# Patient Record
Sex: MALE | Race: WHITE | Employment: UNEMPLOYED | URBAN - METROPOLITAN AREA
[De-identification: names, ages, dates, MRNs, and addresses within clinical notes are randomized per-mention and may not be internally consistent; named-entity substitution may affect disease eponyms.]

---

## 2017-05-09 PROBLEM — N20.1 URETERAL CALCULUS, LEFT: Status: ACTIVE | Noted: 2017-05-09

## 2022-05-03 ENCOUNTER — APPOINTMENT (OUTPATIENT)
Dept: CT IMAGING | Age: 72
End: 2022-05-03
Payer: MEDICARE

## 2022-05-03 ENCOUNTER — HOSPITAL ENCOUNTER (EMERGENCY)
Age: 72
Discharge: HOME | End: 2022-05-03
Payer: MEDICARE

## 2022-05-03 VITALS
WEIGHT: 140 LBS | BODY MASS INDEX: 20.73 KG/M2 | DIASTOLIC BLOOD PRESSURE: 68 MMHG | HEIGHT: 69 IN | OXYGEN SATURATION: 96 % | SYSTOLIC BLOOD PRESSURE: 144 MMHG | HEART RATE: 63 BPM | TEMPERATURE: 98.2 F | RESPIRATION RATE: 18 BRPM

## 2022-05-03 DIAGNOSIS — R79.89 CREATININE ELEVATION: ICD-10-CM

## 2022-05-03 DIAGNOSIS — I71.40 ABDOMINAL AORTIC ANEURYSM (AAA) 3.0 CM TO 5.5 CM IN DIAMETER IN MALE: ICD-10-CM

## 2022-05-03 DIAGNOSIS — S39.012A STRAIN OF LUMBAR REGION, INITIAL ENCOUNTER: Primary | ICD-10-CM

## 2022-05-03 LAB
ALBUMIN SERPL-MCNC: 4 GM/DL (ref 3.4–5)
ALP BLD-CCNC: 137 IU/L (ref 40–129)
ALT SERPL-CCNC: 12 U/L (ref 10–40)
ANION GAP SERPL CALCULATED.3IONS-SCNC: 11 MMOL/L (ref 4–16)
AST SERPL-CCNC: 20 IU/L (ref 15–37)
BILIRUB SERPL-MCNC: 0.7 MG/DL (ref 0–1)
BUN BLDV-MCNC: 34 MG/DL (ref 6–23)
CALCIUM SERPL-MCNC: 9 MG/DL (ref 8.3–10.6)
CHLORIDE BLD-SCNC: 99 MMOL/L (ref 99–110)
CO2: 25 MMOL/L (ref 21–32)
CREAT SERPL-MCNC: 1.6 MG/DL (ref 0.9–1.3)
GFR AFRICAN AMERICAN: 52 ML/MIN/1.73M2
GFR NON-AFRICAN AMERICAN: 43 ML/MIN/1.73M2
GLUCOSE BLD-MCNC: 206 MG/DL (ref 70–99)
POTASSIUM SERPL-SCNC: 5 MMOL/L (ref 3.5–5.1)
SODIUM BLD-SCNC: 135 MMOL/L (ref 135–145)
TOTAL PROTEIN: 7.1 GM/DL (ref 6.4–8.2)

## 2022-05-03 PROCEDURE — 80053 COMPREHEN METABOLIC PANEL: CPT

## 2022-05-03 PROCEDURE — 6370000000 HC RX 637 (ALT 250 FOR IP): Performed by: PHYSICIAN ASSISTANT

## 2022-05-03 PROCEDURE — 72131 CT LUMBAR SPINE W/O DYE: CPT

## 2022-05-03 PROCEDURE — 99284 EMERGENCY DEPT VISIT MOD MDM: CPT

## 2022-05-03 RX ORDER — LIDOCAINE 4 G/G
1 PATCH TOPICAL DAILY
Status: DISCONTINUED | OUTPATIENT
Start: 2022-05-03 | End: 2022-05-03 | Stop reason: HOSPADM

## 2022-05-03 RX ORDER — 0.9 % SODIUM CHLORIDE 0.9 %
1000 INTRAVENOUS SOLUTION INTRAVENOUS ONCE
Status: DISCONTINUED | OUTPATIENT
Start: 2022-05-03 | End: 2022-05-03

## 2022-05-03 RX ORDER — ACETAMINOPHEN 500 MG
1000 TABLET ORAL EVERY 6 HOURS PRN
Qty: 45 TABLET | Refills: 0 | Status: SHIPPED | OUTPATIENT
Start: 2022-05-03

## 2022-05-03 RX ORDER — ACETAMINOPHEN 325 MG/1
650 TABLET ORAL ONCE
Status: COMPLETED | OUTPATIENT
Start: 2022-05-03 | End: 2022-05-03

## 2022-05-03 RX ORDER — METHOCARBAMOL 500 MG/1
750 TABLET, FILM COATED ORAL ONCE
Status: COMPLETED | OUTPATIENT
Start: 2022-05-03 | End: 2022-05-03

## 2022-05-03 RX ORDER — LIDOCAINE 50 MG/G
1 PATCH TOPICAL DAILY
Qty: 30 PATCH | Refills: 0 | Status: SHIPPED | OUTPATIENT
Start: 2022-05-03 | End: 2022-06-02

## 2022-05-03 RX ORDER — METHOCARBAMOL 750 MG/1
750 TABLET, FILM COATED ORAL 4 TIMES DAILY
Qty: 40 TABLET | Refills: 0 | Status: SHIPPED | OUTPATIENT
Start: 2022-05-03 | End: 2022-05-13

## 2022-05-03 RX ADMIN — ACETAMINOPHEN 650 MG: 325 TABLET ORAL at 15:32

## 2022-05-03 RX ADMIN — METHOCARBAMOL 750 MG: 500 TABLET ORAL at 15:32

## 2022-05-03 ASSESSMENT — PAIN DESCRIPTION - ORIENTATION: ORIENTATION: RIGHT

## 2022-05-03 ASSESSMENT — PAIN DESCRIPTION - DESCRIPTORS: DESCRIPTORS: NAGGING

## 2022-05-03 ASSESSMENT — PAIN DESCRIPTION - PAIN TYPE: TYPE: ACUTE PAIN

## 2022-05-03 ASSESSMENT — PAIN SCALES - GENERAL
PAINLEVEL_OUTOF10: 5
PAINLEVEL_OUTOF10: 5

## 2022-05-03 ASSESSMENT — PAIN DESCRIPTION - FREQUENCY: FREQUENCY: INTERMITTENT

## 2022-05-03 ASSESSMENT — PAIN DESCRIPTION - LOCATION: LOCATION: BACK

## 2022-05-03 NOTE — ED PROVIDER NOTES
Patient Identification  Barby Oliva is a 66 y.o. male    Chief Complaint  Back Pain (back pain s/p fall few days ago)      HPI  (History provided by patient and family)  This is a 66 y.o. male who was brought in by POV for chief complaint of right-sided lower back pain has been ongoing since a fall 4 days ago. Patient was attempting to clean his household pet when the dog ran around him with the leash on him, causing him to fall onto his back and grass. Denies loss of consciousness, hitting his head, neck pain, upper back pain or chest pain. Patient reports the pain goes from the right lower side and does radiate somewhat to his right lateral flank. Denies hematuria. Is unsure the last time he urinated. Does report a history of kidney disease and has been admitted in the past for DORCAS. Has not taken any medication for pain. Denies radiation of pain down his legs, denies saddle paresthesia, denies loss of bowel or bladder control. Denies any new numbness or paresthesias. REVIEW OF SYSTEMS    Constitutional:  Denies fever, chills  HENT:  Denies sore throat or ear pain   Eyes: Denies vision changes, eye pain  Cardiovascular:  Denies chest pain, syncope  Respiratory:  Denies shortness of breath, cough   GI:  Denies abdominal pain, nausea, vomiting  :  Denies dysuria, discharge  Musculoskeletal:  Denies joint pain  Skin:  Denies rash, pruritis  Neurologic:  Denies headache, focal weakness, or sensory changes     See HPI and nursing notes for additional information     I have reviewed the following nursing documentation:  Allergies: No Known Allergies    Past medical history:  has a past medical history of CAD (coronary artery disease), COPD (chronic obstructive pulmonary disease) ([facility] [address] Diabetes mellitus ([facility] [address] Hyperlipidemia, Hypertension, and Renal failure.     Past surgical history:  has a past surgical history that includes Coronary artery bypass graft; Coronary angioplasty with stent; and Cystocopy (N/A, 05/09/2017). Home medications:   Prior to Admission medications    Medication Sig Start Date End Date Taking? Authorizing Provider   oxyCODONE-acetaminophen (PERCOCET) 5-325 MG per tablet Take 1 tablet by mouth every 6 hours as needed for Pain . Historical Provider, MD   UNABLE TO FIND Sliding scale insulin    Historical Provider, MD   ondansetron (ZOFRAN ODT) 4 MG disintegrating tablet Take 1 tablet by mouth every 6 hours 5/5/17   Beti Cantu PA-C   dicyclomine (BENTYL) 10 MG capsule Take 2 capsules by mouth 4 times daily (before meals and nightly) 5/5/17   Beti Cantu PA-C   tamsulosin (FLOMAX) 0.4 MG capsule 1 tab by mouth each bedtime when necessary pain from kidney stones. 5/5/17   Beti Cantu PA-C   nitroGLYCERIN (NITROSTAT) 0.4 MG SL tablet Place 1 tablet under the tongue every 5 minutes as needed for Chest pain. 12/15/13   Mona Carrel, MD   atorvastatin (LIPITOR) 40 MG tablet Take 40 mg by mouth daily. Historical Provider, MD   enalapril (VASOTEC) 10 MG tablet Take 10 mg by mouth daily. Historical Provider, MD   amLODIPine (NORVASC) 10 MG tablet Take 10 mg by mouth daily. Historical Provider, MD   metoprolol (TOPROL-XL) 50 MG XL tablet Take 50 mg by mouth daily. Historical Provider, MD   insulin glargine (LANTUS) 100 UNIT/ML injection Inject 24 Units into the skin every morning. Historical Provider, MD   aspirin 81 MG EC tablet Take 81 mg by mouth daily. Historical Provider, MD       Social history:  reports that he has been smoking cigarettes. He has a 37.50 pack-year smoking history. He does not have any smokeless tobacco history on file. He reports current alcohol use. He reports that he does not use drugs.     Family history:    Family History   Problem Relation Age of Onset    Diabetes Mother     Diabetes Father     Diabetes Brother     Diabetes Maternal Grandmother        Exam  BP (!) 144/68   Pulse 63   Temp 98.2 °F (36.8 °C) (Oral)   Resp 18   Ht 5' 9\" (1.753 m)   Wt 140 lb (63.5 kg)   SpO2 96%   BMI 20.67 kg/m²   Nursing note and vitals reviewed. Constitutional: Well developed, well nourished. No acute distress. HENT:      Head: Normocephalic and atraumatic. Ears: External ears normal.      Nose: Nose normal.     Mouth: Membrane mucosa dry and pink. No posterior oropharynx erythema or tonsillar edema  Eyes: Anicteric sclera. No discharge, PERRL  Neck: Supple. Trachea midline. Cardiovascular: RRR, no murmurs, rubs, or gallops, radial pulses 2+ bilaterally. Pulmonary/Chest: Effort normal. No respiratory distress. CTAB. No stridor. No wheezes. No rales. Abdominal: Soft. Nontender to palpation. No distension. No guarding, rebound tenderness, or evidence of ascites. : No CVA tenderness. Back: Obvious deformities, no midline tenderness to palpation of the thoracic or lumbar spine. No step-off. There is hypertonicity appreciated at the right paraspinal muscles. Patient has full range of motion of spine with some worse pain with right lateral rotation. Musculoskeletal: Patient's lower extremity have 5 out of 5 strength with hip abduction, abduction, flexion extension, knee flexion extension, ankle flexion extension. Able to ambulate with a steady gait. Neurological: Alert and oriented to person, place, and time. Normal muscle tone. Sensation intact, no sensory or motor deficits. Deep tendon reflexes are symmetrical bilaterally. Skin: Warm and dry. No rash. Psychiatric: Normal mood and affect. Behavior is normal.      Radiographs (if obtained):  [] The following radiograph was interpreted by myself in the absence of a radiologist:   [x] Radiologist's Report Reviewed:  CT Lumbar Spine WO Contrast   Final Result   1. No evidence of acute osseous abnormality involving the lumbar spine. 2.  There is a 4.1 cm infrarenal abdominal aortic aneurysm, previously   measuring 3.4 cm on May 2017 CT. See recommendations below. RECOMMENDATIONS:   4.1 cm infrarenal abdominal aortic aneurysm. Recommend follow-up every 12   months and vascular consultation. Reference: J Am Di Radiol 4514;42:[id number]. Labs  Results for orders placed or performed during the hospital encounter of 05/03/22   Comprehensive Metabolic Panel w/ Reflex to MG   Result Value Ref Range    Sodium 135 135 - 145 MMOL/L    Potassium 5.0 3.5 - 5.1 MMOL/L    Chloride 99 99 - 110 mMol/L    CO2 25 21 - 32 MMOL/L    BUN 34 (H) 6 - 23 MG/DL    CREATININE 1.6 (H) 0.9 - 1.3 MG/DL    Glucose 206 (H) 70 - 99 MG/DL    Calcium 9.0 8.3 - 10.6 MG/DL    Albumin 4.0 3.4 - 5.0 GM/DL    Total Protein 7.1 6.4 - 8.2 GM/DL    Total Bilirubin 0.7 0.0 - 1.0 MG/DL    ALT 12 10 - 40 U/L    AST 20 15 - 37 IU/L    Alkaline Phosphatase 137 (H) 40 - 129 IU/L    GFR Non- 43 (L) >60 mL/min/1.73m2    GFR  52 (L) >60 mL/min/1.73m2    Anion Gap 11 4 - 16         MDM  The above for more information, patient's physical exam unremarkable does have full range of motion does have some hypertonicity in the right paraspinal muscles with no midline tenderness palpation. Given patient's age I did elect to check a metabolic panel as well as a CT scan of the lumbar spine. CT scan lumbar spine remarkable for abdominal aortic aneurysm that has grown some since his last scan in 2017 but still meets guidelines for surveillance. Patient also has a mild increase in his creatinine, last time it was checked in 2017. I do not feel that patient requires admission as it does not meet criteria for DORCAS. Patient is p.o. tolerant. Has stable vital signs. Pain mildly improved with Lidoderm patch. Did offer patient some Lidoderm patches as well as Tylenol to go home with. Advised that he follow-up with his primary care provider for repeat lab work in the next 1 to 2 weeks. Patient verbalized understanding and agreement with this plan.   At this time I do not suspect cauda equina, cord compression or discitis      Final Impression  1. Low back pain  2. Elevated creatinine  3. Abdominal aortic aneurysm    Blood pressure (!) 144/68, pulse 63, temperature 98.2 °F (36.8 °C), temperature source Oral, resp. rate 18, height 5' 9\" (1.753 m), weight 140 lb (63.5 kg), SpO2 96 %. Disposition:  Discharge to home in stable condition. Patient was given scripts for the following medications. I counseled patient how to take these medications. New Prescriptions    No medications on file       This chart was generated using the [address] [place] [address] dictation system. I created this record but it may contain dictation errors given the limitations of this technology.      Do Patel PA-C  05/03/22 6683

## 2022-05-03 NOTE — ED NOTES
Discharge instructions reviewed with pt. All questions answered at this time. Pt verbalized understanding.       Lamberto Cotton RN  05/03/22 3382

## 2023-01-05 ENCOUNTER — APPOINTMENT (OUTPATIENT)
Dept: GENERAL RADIOLOGY | Age: 73
End: 2023-01-05
Payer: OTHER GOVERNMENT

## 2023-01-05 ENCOUNTER — HOSPITAL ENCOUNTER (EMERGENCY)
Age: 73
Discharge: HOME | End: 2023-01-05
Attending: STUDENT IN AN ORGANIZED HEALTH CARE EDUCATION/TRAINING PROGRAM
Payer: OTHER GOVERNMENT

## 2023-01-05 VITALS
TEMPERATURE: 97.8 F | RESPIRATION RATE: 20 BRPM | HEART RATE: 74 BPM | OXYGEN SATURATION: 96 % | SYSTOLIC BLOOD PRESSURE: 131 MMHG | DIASTOLIC BLOOD PRESSURE: 62 MMHG

## 2023-01-05 DIAGNOSIS — U07.1 COVID-19: Primary | ICD-10-CM

## 2023-01-05 PROCEDURE — 71045 X-RAY EXAM CHEST 1 VIEW: CPT

## 2023-01-05 PROCEDURE — 99283 EMERGENCY DEPT VISIT LOW MDM: CPT

## 2023-01-05 NOTE — ED PROVIDER NOTES
Emergency Department Encounter    Patient: Vasyl Heredia  MRN: [id number]  : 1950  Date of Evaluation: 2023  ED Provider:  Manuel Bell DO    Triage Chief Complaint:   No chief complaint on file. [place]:  Vasyl Heredia is a 74 y.o. male with past medical history of diabetes, hypertension, hyperlipidemia, CAD, COPD, past surgical history of right partial pneumonectomy who presents to the ED on the advice of his doctors at the [facility] after he tested positive for COVID, about 3 to 4 days ago. Patient endorses a history of shortness of breath and cough but states he has a chronic history of both symptoms. Patient denies any change in the symptoms severity since he tested positive for COVID. No history of chest pain, fever, orthopnea, dyspnea on exertion, nausea, diaphoresis, or palpitation.   ROS - see HPI, below listed is current ROS at time of my eval:  ROS is an in history; otherwise unremarkable    Past Medical History:   Diagnosis Date    CAD (coronary artery disease)     COPD (chronic obstructive pulmonary disease) ([facility] [address]     Diabetes mellitus ([facility] [address]     Hyperlipidemia     Hypertension     Renal failure      Past Surgical History:   Procedure Laterality Date    CORONARY ANGIOPLASTY WITH STENT PLACEMENT      CORONARY ARTERY BYPASS GRAFT      CYSTOSCOPY N/A 2017    cysto, left rgpg, left stent     Family History   Problem Relation Age of Onset    Diabetes Mother     Diabetes Father     Diabetes Brother     Diabetes Maternal Grandmother      Social History     Socioeconomic History    Marital status:      Spouse name: Not on file    Number of children: Not on file    Years of education: Not on file    Highest education level: Not on file   Occupational History    Not on file   Tobacco Use    Smoking status: Every Day     Packs/day: 2.50     Years: 15.00     Pack years: 37.50     Types: Cigarettes    Smokeless tobacco: Not on file   Substance and Sexual Activity Alcohol use: Yes     Comment: rarely    Drug use: No    Sexual activity: Not on file   Other Topics Concern    Not on file   Social History Narrative    Not on file     Social Determinants of Health     Financial Resource Strain: Not on file   Food Insecurity: Not on file   Transportation Needs: Not on file   Physical Activity: Not on file   Stress: Not on file   Social Connections: Not on file   Intimate Partner Violence: Not on file   Housing Stability: Not on file     No current facility-administered medications for this encounter. Current Outpatient Medications   Medication Sig Dispense Refill    nirmatrelvir/ritonavir (PAXLOVID, 300/100,) 20 x 150 MG & 10 x 100MG TBPK Take 3 tablets by mouth in the morning and 3 tablets in the evening. Do all this for 5 days. Take 3 tablets (two 150 mg nirmatrelvir and one 100 mg ritonavir tablets) by mouth every 12 hours for 5 days. . 30 tablet 0    acetaminophen (TYLENOL) 500 MG tablet Take 2 tablets by mouth every 6 hours as needed for Pain 45 tablet 0    oxyCODONE-acetaminophen (PERCOCET) 5-325 MG per tablet Take 1 tablet by mouth every 6 hours as needed for Pain . UNABLE TO FIND Sliding scale insulin      ondansetron (ZOFRAN ODT) 4 MG disintegrating tablet Take 1 tablet by mouth every 6 hours 10 tablet 0    dicyclomine (BENTYL) 10 MG capsule Take 2 capsules by mouth 4 times daily (before meals and nightly) 30 capsule 0    tamsulosin (FLOMAX) 0.4 MG capsule 1 tab by mouth each bedtime when necessary pain from kidney stones. 10 capsule 0    nitroGLYCERIN (NITROSTAT) 0.4 MG SL tablet Place 1 tablet under the tongue every 5 minutes as needed for Chest pain. 25 tablet 2    atorvastatin (LIPITOR) 40 MG tablet Take 40 mg by mouth daily. enalapril (VASOTEC) 10 MG tablet Take 10 mg by mouth daily. amLODIPine (NORVASC) 10 MG tablet Take 10 mg by mouth daily. metoprolol (TOPROL-XL) 50 MG XL tablet Take 50 mg by mouth daily.       insulin glargine (LANTUS) 100 UNIT/ML injection Inject 24 Units into the skin every morning. aspirin 81 MG EC tablet Take 81 mg by mouth daily. No Known Allergies    Nursing Notes Reviewed    Physical Exam:  Triage VS:    ED Triage Vitals [01/05/23 1436]   Enc Vitals Group      /62      Heart Rate 74      Resp 20      Temp 97.8 °F (36.6 °C)      Temp Source Oral      SpO2 96 %      Weight       Height       Head Circumference       Peak Flow       Pain Score       Pain Loc       Pain Edu? Excl. in [address]? My pulse ox interpretation is - normal    General appearance:  No acute distress. Skin:  Warm. Dry. Eye:  Extraocular movements intact. Ears, nose, mouth and throat:  Oral mucosa moist   Neck:  Trachea midline. Extremity:  No obvious deformity, erythema, or warmth. No swelling. Normal ROM. Distal pulses intact. Heart:  Regular rate and rhythm, normal S1 & S2, no extra heart sounds. Perfusion:  intact  Respiratory:  Lungs clear to auscultation bilaterally. Respirations nonlabored. Abdominal:  Normal bowel sounds. Soft. Nontender. Non distended. Back:  No CVA tenderness to palpation     Neurological:  Alert and oriented times 3. No focal neuro deficits. Psychiatric:  Appropriate    I have reviewed and interpreted all of the currently available lab results from this visit (if applicable):  No results found for this visit on 01/05/23. Radiographs (if obtained):  Radiologist's Report Reviewed:  XR CHEST PORTABLE   Preliminary Result   Surgical staples and rounded mass in the right hilar region. The most recent   study is from 2013. Correlation with more recent chest x-ray or CT of the   chest is suggested to exclude underlying recurrent disease. No other   abnormality of the lung fields.            .    EKG (if obtained): (All EKG's are interpreted by myself in the absence of a cardiologist)    MDM:  75-year-old male with past medical history of diabetes, hypertension, hyperlipidemia, CAD, COPD, past surgical history of right partial pneumonectomy who presents to the ED on the advice of his doctors at the [facility] after he tested positive for COVID, about 3 to 4 days ago. Patient endorses a history of shortness of breath and cough but states he has a chronic history of both symptoms. Patient denies any change in the symptoms severity since he tested positive for COVID. No history of chest pain, fever, orthopnea, dyspnea on exertion, nausea, diaphoresis, or palpitation. Physical examination is unremarkable. Differentials include COVID-pneumonia, flu, bacterial pneumonia, COPD exacerbation, ACS, pericarditis. Patient scheduled for chest x-ray and blood draw for labs. Patient refuses blood draw for labs at this time and said he just wants a chest x-ray as he was advised by his doctors at [facility].    Chest x-ray ordered. 5:00 PM  Chest x-ray is unremarkable for any acute changes at this time. Patient is stable, no respiratory distress, no labored respiration, no symptoms at this time. Patient is discharged with Paxlovid and advised to follow-up with primary care. Patient advised on adequate hydration and healthy nutrition, self isolation and social distancing. Patient also given return instructions including a persistence or worsening of symptoms including shortness of breath, or any other symptom of concern. Patient verbalizes understanding and agreement with the plan. Clinical Impression:  1. COVID-19      Disposition referral (if applicable):  No follow-up provider specified. Disposition medications (if applicable):  Discharge Medication List as of 1/5/2023  5:10 PM        START taking these medications    Details   nirmatrelvir/ritonavir (PAXLOVID, 300/100,) 20 x 150 MG & 10 x 100MG TBPK Take 3 tablets by mouth in the morning and 3 tablets in the evening. Do all this for 5 days.  Take 3 tablets (two 150 mg nirmatrelvir and one 100 mg ritonavir tablets) by mouth every 12 hours for 5 days. ., Disp-30 tablet, R-0Print           ED Provider Disposition Time  DISPOSITION Decision To Discharge 01/05/2023 05:04:15 PM      Comment: Please note this report has been produced using speech recognition software and may contain errors related to that system including errors in grammar, punctuation, and spelling, as well as words and phrases that may be inappropriate. Efforts were made to edit the dictations.         [address] [place] [address],[address],   01/06/23 1084

## 2023-01-05 NOTE — DISCHARGE INSTRUCTIONS
Full for primary care as soon as possible  Ensure adequate hydration and healthy nutrition. Ensure regular exercise as tolerated including breathing exercises. Return to the ED if symptoms persist or worsen.

## 2023-01-05 NOTE — ED NOTES
Patient noted stable and presenting in no acute distress. Discharge instructions and medication list reviewed (as required) with the patient. All questions and concerns were addressed at this time, and the patient expresses understanding. Patient released with vitals noted as recorded.         Priya De Jesus RN  01/05/23 5383

## 2023-08-07 ENCOUNTER — APPOINTMENT (OUTPATIENT)
Dept: CT IMAGING | Age: 73
DRG: 841 | End: 2023-08-07
Attending: STUDENT IN AN ORGANIZED HEALTH CARE EDUCATION/TRAINING PROGRAM
Payer: OTHER GOVERNMENT

## 2023-08-07 ENCOUNTER — HOSPITAL ENCOUNTER (INPATIENT)
Age: 73
LOS: 3 days | Discharge: HOME | DRG: 841 | End: 2023-08-10
Attending: STUDENT IN AN ORGANIZED HEALTH CARE EDUCATION/TRAINING PROGRAM | Admitting: INTERNAL MEDICINE
Payer: OTHER GOVERNMENT

## 2023-08-07 ENCOUNTER — APPOINTMENT (OUTPATIENT)
Dept: GENERAL RADIOLOGY | Age: 73
DRG: 841 | End: 2023-08-07
Payer: OTHER GOVERNMENT

## 2023-08-07 DIAGNOSIS — I71.40 ABDOMINAL AORTIC ANEURYSM (AAA) WITHOUT RUPTURE, UNSPECIFIED PART (HCC): ICD-10-CM

## 2023-08-07 DIAGNOSIS — E87.5 HYPERKALEMIA: ICD-10-CM

## 2023-08-07 DIAGNOSIS — K92.2 GASTROINTESTINAL HEMORRHAGE, UNSPECIFIED GASTROINTESTINAL HEMORRHAGE TYPE: ICD-10-CM

## 2023-08-07 DIAGNOSIS — C80.1: ICD-10-CM

## 2023-08-07 DIAGNOSIS — D50.0 ANEMIA DUE TO GI BLOOD LOSS: ICD-10-CM

## 2023-08-07 DIAGNOSIS — R07.9 CHEST PAIN, UNSPECIFIED TYPE: Primary | ICD-10-CM

## 2023-08-07 DIAGNOSIS — R79.89 ELEVATED BRAIN NATRIURETIC PEPTIDE (BNP) LEVEL: ICD-10-CM

## 2023-08-07 DIAGNOSIS — R91.8 LUNG NODULES: ICD-10-CM

## 2023-08-07 DIAGNOSIS — D53.9 MACROCYTIC ANEMIA: ICD-10-CM

## 2023-08-07 DIAGNOSIS — C79.51: ICD-10-CM

## 2023-08-07 DIAGNOSIS — D50.0 IRON DEFICIENCY ANEMIA SECONDARY TO BLOOD LOSS (CHRONIC): ICD-10-CM

## 2023-08-07 PROBLEM — C90.00 MULTIPLE MYELOMA (HCC): Status: ACTIVE | Noted: 2023-08-07

## 2023-08-07 LAB
ALBUMIN SERPL-MCNC: 3.6 GM/DL (ref 3.4–5)
ALBUMIN SERPL-MCNC: 3.8 GM/DL (ref 3.4–5)
ALP BLD-CCNC: 147 IU/L (ref 40–129)
ALP BLD-CCNC: 154 IU/L (ref 40–128)
ALT SERPL-CCNC: 28 U/L (ref 10–40)
ALT SERPL-CCNC: 28 U/L (ref 10–40)
ANION GAP SERPL CALCULATED.3IONS-SCNC: 12 MMOL/L (ref 4–16)
ANION GAP SERPL CALCULATED.3IONS-SCNC: 13 MMOL/L (ref 4–16)
ANISOCYTOSIS: ABNORMAL
AST SERPL-CCNC: 34 IU/L (ref 15–37)
AST SERPL-CCNC: 36 IU/L (ref 15–37)
BASOPHILS ABSOLUTE: 0.1 K/CU MM
BASOPHILS RELATIVE PERCENT: 1 % (ref 0–1)
BILIRUB SERPL-MCNC: 0.4 MG/DL (ref 0–1)
BILIRUB SERPL-MCNC: 0.4 MG/DL (ref 0–1)
BILIRUBIN DIRECT: 0.2 MG/DL (ref 0–0.3)
BILIRUBIN, INDIRECT: 0.2 MG/DL (ref 0–0.7)
BUN SERPL-MCNC: 35 MG/DL (ref 6–23)
BUN SERPL-MCNC: 38 MG/DL (ref 6–23)
CALCIUM SERPL-MCNC: 10.1 MG/DL (ref 8.3–10.6)
CALCIUM SERPL-MCNC: 10.1 MG/DL (ref 8.3–10.6)
CHLORIDE BLD-SCNC: 104 MMOL/L (ref 99–110)
CHLORIDE BLD-SCNC: 106 MMOL/L (ref 99–110)
CHP ED QC CHECK: YES
CO2: 23 MMOL/L (ref 21–32)
CO2: 28 MMOL/L (ref 21–32)
COMMENT: ABNORMAL
CREAT SERPL-MCNC: 1.7 MG/DL (ref 0.9–1.3)
CREAT SERPL-MCNC: 1.8 MG/DL (ref 0.9–1.3)
DIFFERENTIAL TYPE: ABNORMAL
EKG ATRIAL RATE: 60 BPM
EKG DIAGNOSIS: NORMAL
EKG P AXIS: 102 DEGREES
EKG P-R INTERVAL: 172 MS
EKG Q-T INTERVAL: 428 MS
EKG QRS DURATION: 84 MS
EKG QTC CALCULATION (BAZETT): 428 MS
EKG R AXIS: 69 DEGREES
EKG T AXIS: 100 DEGREES
EKG VENTRICULAR RATE: 60 BPM
EOSINOPHILS ABSOLUTE: 0.2 K/CU MM
EOSINOPHILS RELATIVE PERCENT: 5 % (ref 0–3)
FERRITIN: 22 NG/ML (ref 30–400)
FOLATE SERPL-MCNC: 5.5 NG/ML (ref 3.1–17.5)
GFR SERPL CREATININE-BSD FRML MDRD: 39 ML/MIN/1.73M2
GFR SERPL CREATININE-BSD FRML MDRD: 42 ML/MIN/1.73M2
GLUCOSE BLD-MCNC: 164 MG/DL (ref 70–99)
GLUCOSE BLD-MCNC: 184 MG/DL (ref 70–99)
GLUCOSE BLD-MCNC: 64 MG/DL
GLUCOSE BLD-MCNC: 64 MG/DL (ref 70–99)
GLUCOSE SERPL-MCNC: 161 MG/DL (ref 70–99)
GLUCOSE SERPL-MCNC: 61 MG/DL (ref 70–99)
HCT VFR BLD CALC: 25.3 % (ref 42–52)
HCT VFR BLD CALC: 27.5 % (ref 42–52)
HEMOGLOBIN: 7.5 GM/DL (ref 13.5–18)
HEMOGLOBIN: 8.1 GM/DL (ref 13.5–18)
IRON: 55 UG/DL (ref 59–158)
LIPASE: 39 IU/L (ref 13–60)
LYMPHOCYTES ABSOLUTE: 1.5 K/CU MM
LYMPHOCYTES RELATIVE PERCENT: 31 % (ref 24–44)
MAGNESIUM: 1.7 MG/DL (ref 1.8–2.4)
MCH RBC QN AUTO: 31.2 PG (ref 27–31)
MCHC RBC AUTO-ENTMCNC: 29.5 % (ref 32–36)
MCV RBC AUTO: 105.8 FL (ref 78–100)
MONOCYTES ABSOLUTE: 0.5 K/CU MM
MONOCYTES RELATIVE PERCENT: 11 % (ref 0–4)
NUCLEATED RBC %: 0 %
PCT TRANSFERRIN: 18 % (ref 10–44)
PDW BLD-RTO: 15.9 % (ref 11.7–14.9)
PLATELET # BLD: 253 K/CU MM (ref 140–440)
PMV BLD AUTO: 9.2 FL (ref 7.5–11.1)
POTASSIUM SERPL-SCNC: 4.7 MMOL/L (ref 3.5–5.1)
POTASSIUM SERPL-SCNC: 5.7 MMOL/L (ref 3.5–5.1)
PRO-BNP: 1531 PG/ML
RBC # BLD: 2.6 M/CU MM (ref 4.6–6.2)
RBC # BLD: ABNORMAL 10*6/UL
RETICULOCYTE COUNT PCT: 1.9 % (ref 0.2–2.2)
SEGMENTED NEUTROPHILS ABSOLUTE COUNT: 2.6 K/CU MM
SEGMENTED NEUTROPHILS RELATIVE PERCENT: 52 % (ref 36–66)
SODIUM BLD-SCNC: 142 MMOL/L (ref 135–145)
SODIUM BLD-SCNC: 144 MMOL/L (ref 135–145)
TOTAL IRON BINDING CAPACITY: 305 UG/DL (ref 250–450)
TOTAL NUCLEATED RBC: 0 K/CU MM
TOTAL PROTEIN: 9 GM/DL (ref 6.4–8.2)
TOTAL PROTEIN: 9.2 GM/DL (ref 6.4–8.2)
TRANSFERRIN SERPL-MCNC: 247.1 MG/DL (ref 200–360)
TROPONIN T: <0.01 NG/ML
UNSATURATED IRON BINDING CAPACITY: 250 UG/DL (ref 110–370)
VITAMIN B-12: 315.2 PG/ML (ref 211–911)
WBC # BLD: 4.9 K/CU MM (ref 4–10.5)

## 2023-08-07 PROCEDURE — 36415 COLL VENOUS BLD VENIPUNCTURE: CPT

## 2023-08-07 PROCEDURE — 83880 ASSAY OF NATRIURETIC PEPTIDE: CPT

## 2023-08-07 PROCEDURE — 83735 ASSAY OF MAGNESIUM: CPT

## 2023-08-07 PROCEDURE — 83690 ASSAY OF LIPASE: CPT

## 2023-08-07 PROCEDURE — 84466 ASSAY OF TRANSFERRIN: CPT

## 2023-08-07 PROCEDURE — 96375 TX/PRO/DX INJ NEW DRUG ADDON: CPT

## 2023-08-07 PROCEDURE — 6360000002 HC RX W HCPCS: Performed by: STUDENT IN AN ORGANIZED HEALTH CARE EDUCATION/TRAINING PROGRAM

## 2023-08-07 PROCEDURE — 6370000000 HC RX 637 (ALT 250 FOR IP): Performed by: INTERNAL MEDICINE

## 2023-08-07 PROCEDURE — 99285 EMERGENCY DEPT VISIT HI MDM: CPT

## 2023-08-07 PROCEDURE — 83010 ASSAY OF HAPTOGLOBIN QUANT: CPT

## 2023-08-07 PROCEDURE — C9113 INJ PANTOPRAZOLE SODIUM, VIA: HCPCS | Performed by: INTERNAL MEDICINE

## 2023-08-07 PROCEDURE — 96374 THER/PROPH/DIAG INJ IV PUSH: CPT

## 2023-08-07 PROCEDURE — 85045 AUTOMATED RETICULOCYTE COUNT: CPT

## 2023-08-07 PROCEDURE — 84484 ASSAY OF TROPONIN QUANT: CPT

## 2023-08-07 PROCEDURE — 80076 HEPATIC FUNCTION PANEL: CPT

## 2023-08-07 PROCEDURE — 94761 N-INVAS EAR/PLS OXIMETRY MLT: CPT

## 2023-08-07 PROCEDURE — 1200000000 HC SEMI PRIVATE

## 2023-08-07 PROCEDURE — 6370000000 HC RX 637 (ALT 250 FOR IP): Performed by: STUDENT IN AN ORGANIZED HEALTH CARE EDUCATION/TRAINING PROGRAM

## 2023-08-07 PROCEDURE — 80053 COMPREHEN METABOLIC PANEL: CPT

## 2023-08-07 PROCEDURE — 93010 ELECTROCARDIOGRAM REPORT: CPT | Performed by: INTERNAL MEDICINE

## 2023-08-07 PROCEDURE — 82962 GLUCOSE BLOOD TEST: CPT

## 2023-08-07 PROCEDURE — 99222 1ST HOSP IP/OBS MODERATE 55: CPT | Performed by: INTERNAL MEDICINE

## 2023-08-07 PROCEDURE — 85018 HEMOGLOBIN: CPT

## 2023-08-07 PROCEDURE — 93005 ELECTROCARDIOGRAM TRACING: CPT | Performed by: STUDENT IN AN ORGANIZED HEALTH CARE EDUCATION/TRAINING PROGRAM

## 2023-08-07 PROCEDURE — 85027 COMPLETE CBC AUTOMATED: CPT

## 2023-08-07 PROCEDURE — 2580000003 HC RX 258: Performed by: INTERNAL MEDICINE

## 2023-08-07 PROCEDURE — 82746 ASSAY OF FOLIC ACID SERUM: CPT

## 2023-08-07 PROCEDURE — 82607 VITAMIN B-12: CPT

## 2023-08-07 PROCEDURE — 71045 X-RAY EXAM CHEST 1 VIEW: CPT

## 2023-08-07 PROCEDURE — 85014 HEMATOCRIT: CPT

## 2023-08-07 PROCEDURE — 71250 CT THORAX DX C-: CPT

## 2023-08-07 PROCEDURE — 2500000003 HC RX 250 WO HCPCS: Performed by: STUDENT IN AN ORGANIZED HEALTH CARE EDUCATION/TRAINING PROGRAM

## 2023-08-07 PROCEDURE — 82728 ASSAY OF FERRITIN: CPT

## 2023-08-07 PROCEDURE — 6360000002 HC RX W HCPCS: Performed by: INTERNAL MEDICINE

## 2023-08-07 PROCEDURE — 80048 BASIC METABOLIC PNL TOTAL CA: CPT

## 2023-08-07 PROCEDURE — 2580000003 HC RX 258

## 2023-08-07 PROCEDURE — 83540 ASSAY OF IRON: CPT

## 2023-08-07 RX ORDER — FUROSEMIDE 10 MG/ML
40 INJECTION INTRAMUSCULAR; INTRAVENOUS ONCE
Status: COMPLETED | OUTPATIENT
Start: 2023-08-07 | End: 2023-08-07

## 2023-08-07 RX ORDER — WATER 1000 ML/1000ML
INJECTION, SOLUTION INTRAVENOUS
Status: COMPLETED
Start: 2023-08-07 | End: 2023-08-07

## 2023-08-07 RX ORDER — SODIUM CHLORIDE 9 MG/ML
INJECTION, SOLUTION INTRAVENOUS CONTINUOUS
Status: DISPENSED | OUTPATIENT
Start: 2023-08-07 | End: 2023-08-08

## 2023-08-07 RX ORDER — INSULIN LISPRO 100 [IU]/ML
0-4 INJECTION, SOLUTION INTRAVENOUS; SUBCUTANEOUS
Status: DISCONTINUED | OUTPATIENT
Start: 2023-08-07 | End: 2023-08-10 | Stop reason: HOSPADM

## 2023-08-07 RX ORDER — GLUCAGON 1 MG/ML
1 KIT INJECTION PRN
Status: DISCONTINUED | OUTPATIENT
Start: 2023-08-07 | End: 2023-08-10 | Stop reason: HOSPADM

## 2023-08-07 RX ORDER — HYDROCHLOROTHIAZIDE 25 MG/1
25 TABLET ORAL DAILY
Status: ON HOLD | COMMUNITY
End: 2023-08-10 | Stop reason: HOSPADM

## 2023-08-07 RX ORDER — LOSARTAN POTASSIUM 50 MG/1
50 TABLET ORAL 2 TIMES DAILY
Status: ON HOLD | COMMUNITY
End: 2023-08-10 | Stop reason: HOSPADM

## 2023-08-07 RX ORDER — AMLODIPINE BESYLATE 10 MG/1
10 TABLET ORAL DAILY
Status: DISCONTINUED | OUTPATIENT
Start: 2023-08-08 | End: 2023-08-10 | Stop reason: HOSPADM

## 2023-08-07 RX ORDER — LOSARTAN POTASSIUM 25 MG/1
50 TABLET ORAL 2 TIMES DAILY
Status: DISCONTINUED | OUTPATIENT
Start: 2023-08-08 | End: 2023-08-10 | Stop reason: HOSPADM

## 2023-08-07 RX ORDER — DOXYCYCLINE HYCLATE 50 MG/1
324 CAPSULE, GELATIN COATED ORAL
COMMUNITY

## 2023-08-07 RX ORDER — TRAMADOL HYDROCHLORIDE 50 MG/1
25 TABLET ORAL EVERY 6 HOURS PRN
Status: DISCONTINUED | OUTPATIENT
Start: 2023-08-07 | End: 2023-08-10 | Stop reason: HOSPADM

## 2023-08-07 RX ORDER — ACETAMINOPHEN 325 MG/1
650 TABLET ORAL EVERY 6 HOURS PRN
Status: DISCONTINUED | OUTPATIENT
Start: 2023-08-07 | End: 2023-08-10 | Stop reason: HOSPADM

## 2023-08-07 RX ORDER — PANTOPRAZOLE SODIUM 40 MG/10ML
80 INJECTION, POWDER, LYOPHILIZED, FOR SOLUTION INTRAVENOUS ONCE
Status: COMPLETED | OUTPATIENT
Start: 2023-08-07 | End: 2023-08-07

## 2023-08-07 RX ORDER — SODIUM CHLORIDE 9 MG/ML
INJECTION, SOLUTION INTRAVENOUS PRN
Status: DISCONTINUED | OUTPATIENT
Start: 2023-08-07 | End: 2023-08-10 | Stop reason: HOSPADM

## 2023-08-07 RX ORDER — DEXTROSE MONOHYDRATE 100 MG/ML
INJECTION, SOLUTION INTRAVENOUS CONTINUOUS PRN
Status: DISCONTINUED | OUTPATIENT
Start: 2023-08-07 | End: 2023-08-10 | Stop reason: HOSPADM

## 2023-08-07 RX ORDER — DOXYCYCLINE HYCLATE 50 MG/1
324 CAPSULE, GELATIN COATED ORAL
Status: DISCONTINUED | OUTPATIENT
Start: 2023-08-08 | End: 2023-08-10 | Stop reason: HOSPADM

## 2023-08-07 RX ORDER — SODIUM CHLORIDE 0.9 % (FLUSH) 0.9 %
5-40 SYRINGE (ML) INJECTION PRN
Status: DISCONTINUED | OUTPATIENT
Start: 2023-08-07 | End: 2023-08-10 | Stop reason: HOSPADM

## 2023-08-07 RX ORDER — ONDANSETRON 4 MG/1
4 TABLET, ORALLY DISINTEGRATING ORAL EVERY 8 HOURS PRN
Status: DISCONTINUED | OUTPATIENT
Start: 2023-08-07 | End: 2023-08-10 | Stop reason: HOSPADM

## 2023-08-07 RX ORDER — ONDANSETRON 2 MG/ML
4 INJECTION INTRAMUSCULAR; INTRAVENOUS EVERY 6 HOURS PRN
Status: DISCONTINUED | OUTPATIENT
Start: 2023-08-07 | End: 2023-08-10 | Stop reason: HOSPADM

## 2023-08-07 RX ORDER — INSULIN LISPRO 100 [IU]/ML
0-4 INJECTION, SOLUTION INTRAVENOUS; SUBCUTANEOUS NIGHTLY
Status: DISCONTINUED | OUTPATIENT
Start: 2023-08-07 | End: 2023-08-10 | Stop reason: HOSPADM

## 2023-08-07 RX ORDER — ENOXAPARIN SODIUM 100 MG/ML
30 INJECTION SUBCUTANEOUS EVERY EVENING
Status: DISCONTINUED | OUTPATIENT
Start: 2023-08-07 | End: 2023-08-10 | Stop reason: HOSPADM

## 2023-08-07 RX ORDER — POLYETHYLENE GLYCOL 3350 17 G/17G
17 POWDER, FOR SOLUTION ORAL DAILY PRN
Status: DISCONTINUED | OUTPATIENT
Start: 2023-08-07 | End: 2023-08-10 | Stop reason: HOSPADM

## 2023-08-07 RX ORDER — HYDROCHLOROTHIAZIDE 25 MG/1
25 TABLET ORAL DAILY
Status: DISCONTINUED | OUTPATIENT
Start: 2023-08-08 | End: 2023-08-10 | Stop reason: HOSPADM

## 2023-08-07 RX ORDER — SODIUM CHLORIDE 0.9 % (FLUSH) 0.9 %
5-40 SYRINGE (ML) INJECTION EVERY 12 HOURS SCHEDULED
Status: DISCONTINUED | OUTPATIENT
Start: 2023-08-07 | End: 2023-08-10 | Stop reason: HOSPADM

## 2023-08-07 RX ORDER — METOPROLOL TARTRATE 50 MG/1
25 TABLET, FILM COATED ORAL 2 TIMES DAILY
COMMUNITY

## 2023-08-07 RX ORDER — ACETAMINOPHEN 650 MG/1
650 SUPPOSITORY RECTAL EVERY 6 HOURS PRN
Status: DISCONTINUED | OUTPATIENT
Start: 2023-08-07 | End: 2023-08-10 | Stop reason: HOSPADM

## 2023-08-07 RX ORDER — INSULIN GLARGINE 100 [IU]/ML
12 INJECTION, SOLUTION SUBCUTANEOUS EVERY MORNING
Status: DISCONTINUED | OUTPATIENT
Start: 2023-08-08 | End: 2023-08-10 | Stop reason: HOSPADM

## 2023-08-07 RX ORDER — ATORVASTATIN CALCIUM 40 MG/1
40 TABLET, FILM COATED ORAL NIGHTLY
Status: DISCONTINUED | OUTPATIENT
Start: 2023-08-07 | End: 2023-08-10 | Stop reason: HOSPADM

## 2023-08-07 RX ADMIN — ENOXAPARIN SODIUM 30 MG: 100 INJECTION SUBCUTANEOUS at 16:51

## 2023-08-07 RX ADMIN — METOPROLOL TARTRATE 25 MG: 25 TABLET, FILM COATED ORAL at 20:55

## 2023-08-07 RX ADMIN — PANTOPRAZOLE SODIUM 80 MG: 40 INJECTION, POWDER, FOR SOLUTION INTRAVENOUS at 17:13

## 2023-08-07 RX ADMIN — SODIUM BICARBONATE 50 MEQ: 84 INJECTION, SOLUTION INTRAVENOUS at 11:50

## 2023-08-07 RX ADMIN — SODIUM ZIRCONIUM CYCLOSILICATE 10 G: 10 POWDER, FOR SUSPENSION ORAL at 20:58

## 2023-08-07 RX ADMIN — ATORVASTATIN CALCIUM 40 MG: 40 TABLET, FILM COATED ORAL at 20:55

## 2023-08-07 RX ADMIN — FUROSEMIDE 40 MG: 10 INJECTION, SOLUTION INTRAMUSCULAR; INTRAVENOUS at 11:50

## 2023-08-07 RX ADMIN — SODIUM ZIRCONIUM CYCLOSILICATE 10 G: 5 POWDER, FOR SUSPENSION ORAL at 12:00

## 2023-08-07 RX ADMIN — SODIUM CHLORIDE: 9 INJECTION, SOLUTION INTRAVENOUS at 16:54

## 2023-08-07 RX ADMIN — WATER 10 ML: 1 INJECTION INTRAMUSCULAR; INTRAVENOUS; SUBCUTANEOUS at 17:13

## 2023-08-07 ASSESSMENT — LIFESTYLE VARIABLES
HOW MANY STANDARD DRINKS CONTAINING ALCOHOL DO YOU HAVE ON A TYPICAL DAY: PATIENT DOES NOT DRINK
HOW OFTEN DO YOU HAVE A DRINK CONTAINING ALCOHOL: NEVER

## 2023-08-07 NOTE — PROGRESS NOTES
Pt states spouse Jaylin Hernandez is currently at a nursing home facility.      Please contact children:  Dominic Blackmon, [phone]  Lisa Tam, [phone]  Daughter, [address], [phone]

## 2023-08-07 NOTE — ED NOTES
ED TO INPATIENT SBAR HANDOFF    Patient Name: Michael Gamboa   :    74 y.o. Preferred Name  Hood Cesar  Family/Caregiver Present no   Restraints no   C-SSRS: Risk of Suicide: No Risk  Sitter no   Sepsis Risk Score Sepsis Risk Score: 2.11      Situation  Chief Complaint   Patient presents with    Chest Pain     Brief Description of Patient's Condition: Pt to the Ed with c/o chest pain. Pt states the pain is on the right side and has been on going for a couple of days. Mental Status: oriented and alert  Arrived from: home    Imaging:   CT CHEST ABDOMEN PELVIS WO CONTRAST Additional Contrast? None   Preliminary Result   1. Overall interval increase in size of a 4.0 cm infrarenal abdominal aortic   aneurysm previously measuring 3.4 cm in 2017. No definite acute aortic   pathology on this unenhanced exam.   2. Postsurgical changes in the right lung. There are a few scattered solid   and sub solid nodules which are indeterminate and could be infectious,   inflammatory neoplastic in etiology. No prior exams are available for   comparison. Recommend a short-term follow-up in 3 months or per clinical   protocol. 3. At least two new discrete areas of lucency within the right ilium and L3   raising suspicion for metastatic disease. There is a new but otherwise age   indeterminate fracture at T11. An underlying pathologic fracture cannot be   excluded. Consider nonemergent evaluation with MRI once acute issues have   resolved. 4. No other acute findings in the chest, abdomen or pelvis on this unenhanced   exam.         XR CHEST PORTABLE   Final Result   No acute abnormality. Unchanged postsurgical changes and prominence of the right hilum.          MRI THORACIC SPINE WO CONTRAST    (Results Pending)     Abnormal labs:   Abnormal Labs Reviewed   CBC - Abnormal; Notable for the following components:       Result Value    RBC 2.60 (*)     Hemoglobin 8.1 (*)     Hematocrit 27.5 (*)     .8 (*) Transparent 08/07/23 0920   Dressing Intervention New 08/07/23 0920       Pertinent or High Risk Medications/Drips: no   If Yes, please provide details:   Blood Product Administration: no  If Yes, please provide details:     Recommendation    Incomplete orders   Additional Comments:    If any further questions, please call Sending RN at [phone]    Electronically signed by: Electronically signed by Karo Mansfield RN on 8/7/2023 at [facility], RN  08/07/23 1316 [facility]  08/07/23 1413

## 2023-08-07 NOTE — H&P
V2.0  History and Physical      Name:  Giovanni Miranda /Age/Sex:   (74 y.o. male)   MRN & CSN:  [id number] & [id number] Encounter Date/Time: 2023 3:28 PM EDT   Location:  ED28/ED-28 PCP: No primary care provider on file.        Hospital Day: 1    Assessment and Plan:   Giovanni Miranda is a 74 y.o. male who presents with Chest pain, rule out acute myocardial infarction    Hospital Problems             Last Modified POA    * (Principal) Chest pain, rule out acute myocardial infarction 2023 Yes     Generalized weakness, fatigue, drowsy, dyspnea on exertion; likely 2/2 anemia  Macrocytic anemia  -Presents with hemoglobin of 8.1, macrocytic.  -Anemia workup  - ml/hr x 24 hrs then reassess  -trend Hb, transfuse of Hb < 7 g/dl  -Consult hematology/oncology  -GI team consulted from ED - plan for scopes - keep NPO from midnight  -liquid diet today - regular diet post scopes    DORCAS versus DORCAS on CKD versus CKD, creatinine 1.8  -Creatinine 1.8 at presentation, lab work from 2017 on record showed creatinine 1.6  -No recent lab works on system  -Obtain records  -appears clinically dry -  ml/hr x 24 hrs  -follow renal function    Hyperkalemia, potassium 5.7, likely 2/2 DORCAS   -patient was given lasix 20 mg in the ED - hold off on diuretics as patient appears clinically dry and may have dorcas  -Lokelma 10 g TID x 6 doses  -follow BMP    Right-sided chest pain, potentially related to mets  -patient wants to avoid pain medication  -use tylenol for now  -tramadol if severe pain  -hold off on NSAIDS    Recently diagnosed multiple myeloma  Age indeterminate fracture at T11  Right ileum and L3 lucency-suspicion for metastatic disease  -consult hematology/oncology team  -obtain records from [place]  -obtain MRI thoacic spine    Insulin-dependent diabetes mellitus, takes Lantus 12 units every morning, sliding scale Humalog insulin  -Resume home insulin regimen  -POCT glucose checks  -Hypoglycemia treatment

## 2023-08-07 NOTE — ED NOTES
1227 paged Dr Prince Cao  08/07/23 1228    1247 repaged Dr Prince Cao  08/07/23 1247    1249 Dr Satish Luke returned call      Valerio Purdy  08/07/23 1258

## 2023-08-07 NOTE — ED NOTES
Medication History  [facility]    Patient Name: Samira Menendez [id number]     Medication history has been completed by: Leti Chu CPhT    Source(s) of information: Patient and documentation from the [facility]    Primary Care Physician: No primary care provider on file. Pharmacy: [facility]    Allergies as of 08/07/2023    (No Known Allergies)        Prior to Admission medications    Medication Sig Start Date End Date Taking? Authorizing Provider   ferrous gluconate (FERGON) 324 (38 Fe) MG tablet Take 1 tablet by mouth daily (with breakfast)   Yes Historical Provider, MD   hydroCHLOROthiazide (HYDRODIURIL) 25 MG tablet Take 1 tablet by mouth daily   Yes Historical Provider, MD   metoprolol tartrate (LOPRESSOR) 50 MG tablet Take 0.5 tablets by mouth 2 times daily 08/07/23 Splits a 50 mg tablet in half for 25 mg dosing BID   Yes Historical Provider, MD   losartan (COZAAR) 50 MG tablet Take 1 tablet by mouth in the morning and 1 tablet in the evening. Yes Historical Provider, MD   insulin aspart (NOVOLOG) 100 UNIT/ML injection pen Inject into the skin 3 times daily 08/07/23 Patient follows sliding scale regimen. Historical Provider, MD   acetaminophen (TYLENOL) 500 MG tablet Take 2 tablets by mouth every 6 hours as needed for Pain 5/3/22   Johnathan Miranda PA-C   nitroGLYCERIN (NITROSTAT) 0.4 MG SL tablet Place 1 tablet under the tongue every 5 minutes as needed for Chest pain.  12/15/13   Homero Mireles MD   atorvastatin (LIPITOR) 40 MG tablet Take 1 tablet by mouth nightly 08/07/23 Patient splits an 80 mg tablet in half for 40 mg dosing    Historical Provider, MD   amLODIPine (NORVASC) 10 MG tablet Take 1 tablet by mouth daily    Historical Provider, MD   insulin glargine (LANTUS) 100 UNIT/ML injection Inject 12 Units into the skin every morning    Historical Provider, MD   aspirin 81 MG EC tablet Take 1 tablet by mouth daily    Historical Provider, MD     Medications added or changed (ex. new medication, dosage change, interval change, formulation change):  Ferrous gluconate (added)  Novolog (added)  Lantus dosage change from 24 units to 12 units  Hctz (added)  Losartan (added)  Metoprolol ER changed to Metoprolol tartrate    Medications removed from list (include reason, ex. noncompliance, medication cost, therapy complete etc.):   Dicyclomine not taking  Enalapril not taking  Ondansetron not taking  Percocet therapy complete  Tamsulosin therapy complete    Comments:  Medication list reviewed with patient and verified per [facility] documentation. Insulin updated per information received. Patient states he uses Novolog on sliding scale and takes Lantus 12 units q am.  Reports first dose of medications taken today including insulin.     To my knowledge the above medication history is accurate as of 8/7/2023 9:51 AM.   Mauricio Ching CPhT   8/7/2023 9:51 AM

## 2023-08-07 NOTE — ED PROVIDER NOTES
Emergency Department Encounter    Patient: Eleanor Hodge  MRN: [id number]  : 1950  Date of Evaluation: 2023  ED Provider:  Bharathi Garcia DO    Triage Chief Complaint:   Chest Pain    [place]:  Eleanor Hodge is a 74 y.o. male with past medical history of diabetes, hypertension, hyperlipidemia, CAD, COPD, AAA, lung cancer s/p pneumonectomy, presenting to the ED with a history of right-sided chest pain which started couple of days ago. Patient states that he has always had right-sided chest wall pain after his pulmonectomy, couple of years ago but a few days ago he seemed to be having a new type of chest pain in the anterior chest wall on the right side. Patient also endorses a history of occasional nausea and vomiting as well as fatigue. No history of shortness of breath, fever, palpitations, cough, lightheadedness, nausea, diaphoresis, alcoholism,recent prolonged immobility, trauma or surgery.      ROS - see HPI, below listed is current ROS at time of my eval:  Review of Systems    ROS is an in history; otherwise unremarkable    Past Medical History:   Diagnosis Date    CAD (coronary artery disease)     COPD (chronic obstructive pulmonary disease) ([address])     Diabetes mellitus ([address])     Hyperlipidemia     Hypertension     Multiple myeloma ([address]) 2023    Renal failure      Past Surgical History:   Procedure Laterality Date    CORONARY ANGIOPLASTY WITH STENT PLACEMENT      CORONARY ARTERY BYPASS GRAFT      CYSTOSCOPY N/A 2017    cysto, left rgpg, left stent     Family History   Problem Relation Age of Onset    Diabetes Mother     Diabetes Father     Diabetes Brother     Diabetes Maternal Grandmother      Social History     Socioeconomic History    Marital status:      Spouse name: Not on file    Number of children: Not on file    Years of education: Not on file    Highest education level: Not on file   Occupational History    Not on file   Tobacco Use    Smoking status: a history of right-sided chest pain which started couple of days ago. Patient states that he has always had right-sided chest wall pain after his pulmonectomy, couple of years ago but a few days ago he seemed to be having a new type of chest pain in the anterior chest wall on the right side. Patient also endorses a history of occasional nausea and vomiting as well as fatigue. No history of shortness of breath, fever, palpitations, cough, lightheadedness, nausea, diaphoresis, alcoholism,recent prolonged immobility, trauma or surgery. Physical examination is significant for reproducible chest pain on palpation, bilateral crackles on lung auscultation, cachectic    Significant differentials considered at this time include, but not limited to,   - ACS, PE, Pneumonia, Pneumothorax/Tension Pneumothorax, Esophageal rupture, Aortic Dissection, Cardiac Tamponade, Pericarditis      EKG significant for normal sinus rhythm, normal EKG, normal axis  Ventricular rate of 60  Count of 172  QRS duration 84  QT/QTc 428/428  PRT axes 102 69 100  No STEMI  EKG is interpreted by me, pending read by cardiologist.         Laboratory evaluations include[de-identified]  Considered CBC, CMP, Troponin, VBG, BNP, Coags  Done: CBC, CMP, troponin, lipase, magnesium  Significant results : hgb 8.1, elevated potassium levels, elevated BNP,     Radiology include:  Considered CXR, CT-chest   Done: Chest x-ray  Significant results K at 4.0  Some infiltrates in 3 and ileum suggestive of possible metastasis. CT also shows some lung lesions suspicious for metastasis versus infection. AAA    Medications: Mello Fothergill     Medications   pantoprazole (PROTONIX) injection 80 mg (has no administration in time range)   furosemide (LASIX) injection 40 mg (40 mg IntraVENous Given 8/7/23 1150)   sodium zirconium cyclosilicate (LOKELMA) oral suspension 10 g (10 g Oral Given 8/7/23 1200)   sodium bicarbonate 8.4 % injection 50 mEq (50 mEq IntraVENous Given 8/7/23 1150)

## 2023-08-07 NOTE — CONSULTS
[facility] Gastroenterology and Hepatology             MD Shukri Mims MD             Sean Medina, APRN-CNP             [address] [facility] [address], [address] [facility]             [phone] fax [phone]      Gastroenterology Consult Note  Shukri Vences MD      Reason for Consult:  Concern for GI bleed. Primary Care Physician:  No primary care provider on file. History Obtained From:  patient, family member - son, electronic medical record    CC: Abdominal Pain,     HISTORY OF PRESENT ILLNESS:              The patient is a 74 y.o.  male with past medical history of COPD, COPD, diabetes mellitus, remote history of lung resection, status post composite graft to nasal columella who presented to the hospital with significant complaint of fatigue, chest pain. According to the patient's son who was present at the bedside, the patient was recently diagnosed with multiple myeloma. He mentions he was noted to be anemic at the [place] and he had a recent biopsy done over there as well. Currently no records are there in [address] [place] [address] however the son is able to give history. The patient currently denies any nausea, vomiting, melena, hematochezia. He mentions that he has never had a colonoscopy and would defer. He does endorse some chest pain that is nonexertional and intermittent. Denies any fever, chills, nausea, vomiting. On lab workup noted to be anemic with a hemoglobin of 8.1, microcytic, no leukocytosis, platelet count 726. LFTs noted to be unremarkable except for alkaline phosphatase of 147. Lipase 39. BNP 1531. Electrolytes with mild hyperkalemia potassium of 5.7, creatinine of 1.8.   CT of the abdomen pelvis reveals 4 cm infrarenal abdominal aortic aneurysm, postsurgical changes in the right lung, nodules which are indeterminate, 2 new discrete areas of lucency within the right ilium and L3 raising suspicion for metastatic disease, indeterminate fracture of

## 2023-08-08 ENCOUNTER — ANESTHESIA EVENT (OUTPATIENT)
Dept: ENDOSCOPY | Age: 73
DRG: 841 | End: 2023-08-08
Payer: OTHER GOVERNMENT

## 2023-08-08 ENCOUNTER — APPOINTMENT (OUTPATIENT)
Dept: MRI IMAGING | Age: 73
DRG: 841 | End: 2023-08-08
Payer: OTHER GOVERNMENT

## 2023-08-08 PROBLEM — R07.9 CHEST PAIN: Status: ACTIVE | Noted: 2023-08-08

## 2023-08-08 LAB
ALBUMIN SERPL-MCNC: 3 GM/DL (ref 3.4–5)
ALP BLD-CCNC: 120 IU/L (ref 40–129)
ALT SERPL-CCNC: 18 U/L (ref 10–40)
ANION GAP SERPL CALCULATED.3IONS-SCNC: 12 MMOL/L (ref 4–16)
AST SERPL-CCNC: 23 IU/L (ref 15–37)
BASOPHILS ABSOLUTE: 0 K/CU MM
BASOPHILS RELATIVE PERCENT: 1 % (ref 0–1)
BILIRUB SERPL-MCNC: 0.3 MG/DL (ref 0–1)
BILIRUBIN DIRECT: 0.2 MG/DL (ref 0–0.3)
BILIRUBIN, INDIRECT: 0.1 MG/DL (ref 0–0.7)
BUN SERPL-MCNC: 31 MG/DL (ref 6–23)
CALCIUM SERPL-MCNC: 9.2 MG/DL (ref 8.3–10.6)
CHLORIDE BLD-SCNC: 106 MMOL/L (ref 99–110)
CO2: 24 MMOL/L (ref 21–32)
CREAT SERPL-MCNC: 1.8 MG/DL (ref 0.9–1.3)
DIFFERENTIAL TYPE: ABNORMAL
EOSINOPHILS ABSOLUTE: 0.2 K/CU MM
EOSINOPHILS RELATIVE PERCENT: 5 % (ref 0–3)
GFR SERPL CREATININE-BSD FRML MDRD: 39 ML/MIN/1.73M2
GLUCOSE BLD-MCNC: 113 MG/DL (ref 70–99)
GLUCOSE BLD-MCNC: 183 MG/DL (ref 70–99)
GLUCOSE BLD-MCNC: 50 MG/DL (ref 70–99)
GLUCOSE BLD-MCNC: 62 MG/DL (ref 70–99)
GLUCOSE BLD-MCNC: 77 MG/DL (ref 70–99)
GLUCOSE BLD-MCNC: 98 MG/DL (ref 70–99)
GLUCOSE SERPL-MCNC: 51 MG/DL (ref 70–99)
HCT VFR BLD CALC: 23.7 % (ref 42–52)
HEMOGLOBIN: 7.2 GM/DL (ref 13.5–18)
INR BLD: 1.3 INDEX
LYMPHOCYTES ABSOLUTE: 1.3 K/CU MM
LYMPHOCYTES RELATIVE PERCENT: 29 % (ref 24–44)
MAGNESIUM: 1.6 MG/DL (ref 1.8–2.4)
MCH RBC QN AUTO: 31.9 PG (ref 27–31)
MCHC RBC AUTO-ENTMCNC: 30.4 % (ref 32–36)
MCV RBC AUTO: 104.9 FL (ref 78–100)
MONOCYTES ABSOLUTE: 0.3 K/CU MM
MONOCYTES RELATIVE PERCENT: 7 % (ref 0–4)
PDW BLD-RTO: 15.8 % (ref 11.7–14.9)
PHOSPHORUS: 4.3 MG/DL (ref 2.5–4.9)
PLATELET # BLD: 177 K/CU MM (ref 140–440)
PMV BLD AUTO: 9.1 FL (ref 7.5–11.1)
POTASSIUM SERPL-SCNC: 4.1 MMOL/L (ref 3.5–5.1)
PROTHROMBIN TIME: 15.9 SECONDS (ref 11.7–14.5)
RBC # BLD: 2.26 M/CU MM (ref 4.6–6.2)
SEGMENTED NEUTROPHILS ABSOLUTE COUNT: 2.6 K/CU MM
SEGMENTED NEUTROPHILS RELATIVE PERCENT: 58 % (ref 36–66)
SMEAR REVIEW: NORMAL
SODIUM BLD-SCNC: 142 MMOL/L (ref 135–145)
TOTAL PROTEIN: 7.7 GM/DL (ref 6.4–8.2)
WBC # BLD: 4.4 K/CU MM (ref 4–10.5)

## 2023-08-08 PROCEDURE — 6370000000 HC RX 637 (ALT 250 FOR IP): Performed by: INTERNAL MEDICINE

## 2023-08-08 PROCEDURE — 94761 N-INVAS EAR/PLS OXIMETRY MLT: CPT

## 2023-08-08 PROCEDURE — 83735 ASSAY OF MAGNESIUM: CPT

## 2023-08-08 PROCEDURE — 2580000003 HC RX 258: Performed by: INTERNAL MEDICINE

## 2023-08-08 PROCEDURE — 85027 COMPLETE CBC AUTOMATED: CPT

## 2023-08-08 PROCEDURE — 72146 MRI CHEST SPINE W/O DYE: CPT

## 2023-08-08 PROCEDURE — 82248 BILIRUBIN DIRECT: CPT

## 2023-08-08 PROCEDURE — 99223 1ST HOSP IP/OBS HIGH 75: CPT | Performed by: INTERNAL MEDICINE

## 2023-08-08 PROCEDURE — 1200000000 HC SEMI PRIVATE

## 2023-08-08 PROCEDURE — 36415 COLL VENOUS BLD VENIPUNCTURE: CPT

## 2023-08-08 PROCEDURE — 6360000002 HC RX W HCPCS: Performed by: PHYSICIAN ASSISTANT

## 2023-08-08 PROCEDURE — 82962 GLUCOSE BLOOD TEST: CPT

## 2023-08-08 PROCEDURE — 84100 ASSAY OF PHOSPHORUS: CPT

## 2023-08-08 PROCEDURE — 99233 SBSQ HOSP IP/OBS HIGH 50: CPT | Performed by: INTERNAL MEDICINE

## 2023-08-08 PROCEDURE — 85610 PROTHROMBIN TIME: CPT

## 2023-08-08 PROCEDURE — 6360000002 HC RX W HCPCS: Performed by: INTERNAL MEDICINE

## 2023-08-08 PROCEDURE — 2580000003 HC RX 258: Performed by: PHYSICIAN ASSISTANT

## 2023-08-08 PROCEDURE — 85007 BL SMEAR W/DIFF WBC COUNT: CPT

## 2023-08-08 PROCEDURE — 80053 COMPREHEN METABOLIC PANEL: CPT

## 2023-08-08 RX ORDER — MAGNESIUM SULFATE 1 G/100ML
1000 INJECTION INTRAVENOUS ONCE
Status: COMPLETED | OUTPATIENT
Start: 2023-08-08 | End: 2023-08-08

## 2023-08-08 RX ORDER — FOLIC ACID 1 MG/1
1 TABLET ORAL DAILY
Status: DISCONTINUED | OUTPATIENT
Start: 2023-08-08 | End: 2023-08-10 | Stop reason: HOSPADM

## 2023-08-08 RX ORDER — LANOLIN ALCOHOL/MO/W.PET/CERES
1000 CREAM (GRAM) TOPICAL DAILY
Status: DISCONTINUED | OUTPATIENT
Start: 2023-08-09 | End: 2023-08-10 | Stop reason: HOSPADM

## 2023-08-08 RX ORDER — MAGNESIUM SULFATE IN WATER 40 MG/ML
2000 INJECTION, SOLUTION INTRAVENOUS ONCE
Status: COMPLETED | OUTPATIENT
Start: 2023-08-08 | End: 2023-08-08

## 2023-08-08 RX ORDER — CYANOCOBALAMIN 1000 UG/ML
1000 INJECTION, SOLUTION INTRAMUSCULAR; SUBCUTANEOUS ONCE
Status: DISCONTINUED | OUTPATIENT
Start: 2023-08-08 | End: 2023-08-10 | Stop reason: HOSPADM

## 2023-08-08 RX ADMIN — MAGNESIUM SULFATE HEPTAHYDRATE 2000 MG: 40 INJECTION, SOLUTION INTRAVENOUS at 10:53

## 2023-08-08 RX ADMIN — FERROUS GLUCONATE 324 MG: 324 TABLET ORAL at 10:42

## 2023-08-08 RX ADMIN — METOPROLOL TARTRATE 25 MG: 25 TABLET, FILM COATED ORAL at 10:41

## 2023-08-08 RX ADMIN — ATORVASTATIN CALCIUM 40 MG: 40 TABLET, FILM COATED ORAL at 20:54

## 2023-08-08 RX ADMIN — DEXTROSE MONOHYDRATE 125 ML: 100 INJECTION, SOLUTION INTRAVENOUS at 13:10

## 2023-08-08 RX ADMIN — METOPROLOL TARTRATE 25 MG: 25 TABLET, FILM COATED ORAL at 20:54

## 2023-08-08 RX ADMIN — POLYETHYLENE GLYCOL 3350, SODIUM SULFATE ANHYDROUS, SODIUM BICARBONATE, SODIUM CHLORIDE, POTASSIUM CHLORIDE 4000 ML: 236; 22.74; 6.74; 5.86; 2.97 POWDER, FOR SOLUTION ORAL at 18:20

## 2023-08-08 RX ADMIN — MAGNESIUM SULFATE IN DEXTROSE 1000 MG: 10 INJECTION, SOLUTION INTRAVENOUS at 14:50

## 2023-08-08 RX ADMIN — AMLODIPINE BESYLATE 10 MG: 10 TABLET ORAL at 10:42

## 2023-08-08 RX ADMIN — IRON SUCROSE 300 MG: 20 INJECTION, SOLUTION INTRAVENOUS at 16:20

## 2023-08-08 RX ADMIN — SODIUM CHLORIDE, PRESERVATIVE FREE 10 ML: 5 INJECTION INTRAVENOUS at 20:54

## 2023-08-08 RX ADMIN — INSULIN GLARGINE 12 UNITS: 100 INJECTION, SOLUTION SUBCUTANEOUS at 10:42

## 2023-08-08 ASSESSMENT — LIFESTYLE VARIABLES: SMOKING_STATUS: 1

## 2023-08-08 NOTE — ANESTHESIA PRE PROCEDURE
Department of Anesthesiology  Preprocedure Note       Name:  Clara Corea   Age:  74 y.o.  :  1950                                          MRN:  [id number]         Date:  2023      Surgeon: Baldemar Perla):  Yeni Morales MD    Procedure: Procedure(s):  COLONOSCOPY DIAGNOSTIC  EGD BIOPSY    Medications prior to admission:   Prior to Admission medications    Medication Sig Start Date End Date Taking? Authorizing Provider   ferrous gluconate (FERGON) 324 (38 Fe) MG tablet Take 1 tablet by mouth daily (with breakfast)   Yes Historical Provider, MD   hydroCHLOROthiazide (HYDRODIURIL) 25 MG tablet Take 1 tablet by mouth daily   Yes Historical Provider, MD   metoprolol tartrate (LOPRESSOR) 50 MG tablet Take 0.5 tablets by mouth 2 times daily 23 Splits a 50 mg tablet in half for 25 mg dosing BID   Yes Historical Provider, MD   losartan (COZAAR) 50 MG tablet Take 1 tablet by mouth in the morning and 1 tablet in the evening. Yes Historical Provider, MD   insulin aspart (NOVOLOG) 100 UNIT/ML injection pen Inject into the skin 3 times daily 23 Patient follows sliding scale regimen. Historical Provider, MD   acetaminophen (TYLENOL) 500 MG tablet Take 2 tablets by mouth every 6 hours as needed for Pain 5/3/22   Padmini Harris PA-C   nitroGLYCERIN (NITROSTAT) 0.4 MG SL tablet Place 1 tablet under the tongue every 5 minutes as needed for Chest pain.  12/15/13   Efrain Velazco MD   atorvastatin (LIPITOR) 40 MG tablet Take 1 tablet by mouth nightly 23 Patient splits an 80 mg tablet in half for 40 mg dosing    Historical Provider, MD   amLODIPine (NORVASC) 10 MG tablet Take 1 tablet by mouth daily    Historical Provider, MD   insulin glargine (LANTUS) 100 UNIT/ML injection Inject 12 Units into the skin every morning    Historical Provider, MD   aspirin 81 MG EC tablet Take 1 tablet by mouth daily    Historical Provider, MD       Current medications:    Current Facility-Administered Medications

## 2023-08-08 NOTE — PROGRESS NOTES
Pt refused bed/chair alarm. This RN went in to have pt sign refusal form, and pt refused to sign the refusal form. Educated provided to pt and pt verbalized understanding of not having bed/chair alarms on.  LG

## 2023-08-08 NOTE — CONSULTS
Hematology/Oncology Consult Note    Patient Name:  Donny Parra  Patient :  1950  Patient MRN:  [id number]     PCP: No primary care provider on file. Date of Service: 2023      Reason for Consult: Multiple Myeloma     Chief Complaint:    Chief Complaint   Patient presents with    Chest Pain     Principal Problem:    Chest pain, rule out acute myocardial infarction  Active Problems:    Macrocytic anemia    Multiple myeloma ([address])  Resolved Problems:    * No resolved hospital problems. *    HPI:   Donny Parra is a 74 y.o. male with a PMHX of COPD, CAD, DM, R sided Lung Cancer s/p resection, recently diagnosed multiple myeloma who presented to [facility] complaining of new R sided chest pain. He also complains of fatigue, generalized weakness, inability to do the things in his home as he was able to previously. His care has previously been at the [facility] and he reportedly had a referral from the [facility] to Dr Regulo Bennett (per daughter has cared for family in the past) to establish for MM which has not yet been processed. Basic anemia workup for Hgb 7-8 was significant for ferritin 22. He denies any s/s of active bleeding however has deferred CRC screening and has never had an endoscopic study. He is resistant to further procedures and has poor medical literacy along with general mistrust of the healthcare system. Discussed in depth rationale for GI workup for NABIL regardless of myeloma as well as different cancer types as separate entities. No fever/chills. Some N/V. No change in bowel/bladder habits. Eating poorly. Cr 1.8, unknown baseline. Limited information in Care Everywhere, unable to see SPEP or pathology report. Immunoglobulins with elevated IgA. CT CAP was significant for a few scattered solid and sub solid indeterminate nodules and areas of lucency in R ilim and L3 as well as age indeterminate fx at T11. He was noted to have iron deficiency anemia on this admission and GI is on the case.  We are awaiting to have all the records from [place]. Past Medical History:   Diagnosis Date    CAD (coronary artery disease)     COPD (chronic obstructive pulmonary disease) ([address] [facility])     Diabetes mellitus ([address] [facility])     Hyperlipidemia     Hypertension     Multiple myeloma ([address] [facility]) 8/7/2023    Renal failure        Past Surgical History:   Procedure Laterality Date    CORONARY ANGIOPLASTY WITH STENT PLACEMENT      CORONARY ARTERY BYPASS GRAFT      CYSTOSCOPY N/A 05/09/2017    cysto, left rgpg, left stent                                                                                Social History     Socioeconomic History    Marital status:      Spouse name: Not on file    Number of children: Not on file    Years of education: Not on file    Highest education level: Not on file   Occupational History    Not on file   Tobacco Use    Smoking status: Every Day     Packs/day: 2.50     Years: 15.00     Pack years: 37.50     Types: Cigarettes    Smokeless tobacco: Not on file   Substance and Sexual Activity    Alcohol use: Yes     Comment: rarely    Drug use: No    Sexual activity: Not on file   Other Topics Concern    Not on file   Social History Narrative    Not on file     Social Determinants of Health     Financial Resource Strain: Not on file   Food Insecurity: Not on file   Transportation Needs: Not on file   Physical Activity: Not on file   Stress: Not on file   Social Connections: Not on file   Intimate Partner Violence: Not on file   Housing Stability: Not on file                                                                                    Family History   Problem Relation Age of Onset    Diabetes Mother     Diabetes Father     Diabetes Brother     Diabetes Maternal Grandmother                                                                                                No Known Allergies    No current facility-administered medications on file prior to encounter.      Current Outpatient Medications on File Prior to reviewed radiologic and biochemical tests on this patient. Management Plan is developed mutually with Jody Peña PA-C.  I have reviewed above note and agree with assessment and plan

## 2023-08-08 NOTE — CARE COORDINATION
Pt has a PCP and has insurance to help with healthcare cost. Pt is independent of his ADLs. Pt denies needs and plans home         08/08/23 2937   Service Assessment   Patient Orientation Alert and Oriented   Cognition Alert   History Provided By Patient;Medical Record   Primary Caregiver Self   Support Systems Spouse/Significant Other;Children   Patient's Healthcare Decision Maker is: Legal Next of Kin   PCP Verified by CM Yes   Prior Functional Level Independent in ADLs/IADLs   Current Functional Level Independent in ADLs/IADLs   Can patient return to prior living arrangement Yes   Ability to make needs known: Good   Family able to assist with home care needs: No   Would you like for me to discuss the discharge plan with any other family members/significant others, and if so, who? No   Condition of Participation: Discharge Planning   The Patient and/or Patient Representative was provided with a Choice of Provider? Patient   The Patient and/Or Patient Representative agree with the Discharge Plan?  Yes

## 2023-08-09 ENCOUNTER — ANESTHESIA (OUTPATIENT)
Dept: ENDOSCOPY | Age: 73
DRG: 841 | End: 2023-08-09
Payer: OTHER GOVERNMENT

## 2023-08-09 PROBLEM — D50.0 ANEMIA DUE TO GI BLOOD LOSS: Status: ACTIVE | Noted: 2023-08-09

## 2023-08-09 PROBLEM — D50.9 IRON DEFICIENCY ANEMIA: Status: ACTIVE | Noted: 2023-08-09

## 2023-08-09 PROBLEM — E43 SEVERE MALNUTRITION (HCC): Chronic | Status: ACTIVE | Noted: 2023-08-09

## 2023-08-09 LAB
ALBUMIN SERPL-MCNC: 3.2 GM/DL (ref 3.4–5)
ALP BLD-CCNC: 119 IU/L (ref 40–129)
ALT SERPL-CCNC: 17 U/L (ref 10–40)
ANION GAP SERPL CALCULATED.3IONS-SCNC: 13 MMOL/L (ref 4–16)
AST SERPL-CCNC: 23 IU/L (ref 15–37)
BASOPHILS ABSOLUTE: 0 K/CU MM
BASOPHILS RELATIVE PERCENT: 0.6 % (ref 0–1)
BILIRUB SERPL-MCNC: 0.4 MG/DL (ref 0–1)
BILIRUBIN DIRECT: 0.2 MG/DL (ref 0–0.3)
BILIRUBIN, INDIRECT: 0.2 MG/DL (ref 0–0.7)
BUN SERPL-MCNC: 24 MG/DL (ref 6–23)
CALCIUM SERPL-MCNC: 9 MG/DL (ref 8.3–10.6)
CHLORIDE BLD-SCNC: 98 MMOL/L (ref 99–110)
CO2: 24 MMOL/L (ref 21–32)
CREAT SERPL-MCNC: 1.7 MG/DL (ref 0.9–1.3)
DIFFERENTIAL TYPE: ABNORMAL
EOSINOPHILS ABSOLUTE: 0.3 K/CU MM
EOSINOPHILS RELATIVE PERCENT: 5.7 % (ref 0–3)
GFR SERPL CREATININE-BSD FRML MDRD: 42 ML/MIN/1.73M2
GLUCOSE BLD-MCNC: 101 MG/DL (ref 70–99)
GLUCOSE BLD-MCNC: 127 MG/DL (ref 70–99)
GLUCOSE BLD-MCNC: 168 MG/DL (ref 70–99)
GLUCOSE BLD-MCNC: 205 MG/DL (ref 70–99)
GLUCOSE BLD-MCNC: 237 MG/DL (ref 70–99)
GLUCOSE BLD-MCNC: 46 MG/DL (ref 70–99)
GLUCOSE BLD-MCNC: 59 MG/DL (ref 70–99)
GLUCOSE BLD-MCNC: 61 MG/DL (ref 70–99)
GLUCOSE SERPL-MCNC: 41 MG/DL (ref 70–99)
HCT VFR BLD CALC: 24.6 % (ref 42–52)
HEMOGLOBIN: 7.5 GM/DL (ref 13.5–18)
IMMATURE NEUTROPHIL %: 0.4 % (ref 0–0.43)
LYMPHOCYTES ABSOLUTE: 1.4 K/CU MM
LYMPHOCYTES RELATIVE PERCENT: 28.7 % (ref 24–44)
MAGNESIUM: 2.2 MG/DL (ref 1.8–2.4)
MCH RBC QN AUTO: 32.1 PG (ref 27–31)
MCHC RBC AUTO-ENTMCNC: 30.5 % (ref 32–36)
MCV RBC AUTO: 105.1 FL (ref 78–100)
MONOCYTES ABSOLUTE: 0.8 K/CU MM
MONOCYTES RELATIVE PERCENT: 15.3 % (ref 0–4)
NUCLEATED RBC %: 0 %
PDW BLD-RTO: 15.7 % (ref 11.7–14.9)
PHOSPHORUS: 3.7 MG/DL (ref 2.5–4.9)
PLATELET # BLD: 179 K/CU MM (ref 140–440)
PMV BLD AUTO: 9.4 FL (ref 7.5–11.1)
POTASSIUM SERPL-SCNC: 4.3 MMOL/L (ref 3.5–5.1)
RBC # BLD: 2.34 M/CU MM (ref 4.6–6.2)
SEGMENTED NEUTROPHILS ABSOLUTE COUNT: 2.4 K/CU MM
SEGMENTED NEUTROPHILS RELATIVE PERCENT: 49.3 % (ref 36–66)
SODIUM BLD-SCNC: 135 MMOL/L (ref 135–145)
TOTAL IMMATURE NEUTOROPHIL: 0.02 K/CU MM
TOTAL NUCLEATED RBC: 0 K/CU MM
TOTAL PROTEIN: 7.9 GM/DL (ref 6.4–8.2)
WBC # BLD: 4.9 K/CU MM (ref 4–10.5)

## 2023-08-09 PROCEDURE — 88342 IMHCHEM/IMCYTCHM 1ST ANTB: CPT | Performed by: PATHOLOGY

## 2023-08-09 PROCEDURE — 3700000001 HC ADD 15 MINUTES (ANESTHESIA): Performed by: INTERNAL MEDICINE

## 2023-08-09 PROCEDURE — 3609010600 HC COLONOSCOPY POLYPECTOMY SNARE/COLD BIOPSY: Performed by: INTERNAL MEDICINE

## 2023-08-09 PROCEDURE — 2500000003 HC RX 250 WO HCPCS: Performed by: INTERNAL MEDICINE

## 2023-08-09 PROCEDURE — 3700000000 HC ANESTHESIA ATTENDED CARE: Performed by: INTERNAL MEDICINE

## 2023-08-09 PROCEDURE — 2580000003 HC RX 258: Performed by: INTERNAL MEDICINE

## 2023-08-09 PROCEDURE — 6360000002 HC RX W HCPCS: Performed by: NURSE ANESTHETIST, CERTIFIED REGISTERED

## 2023-08-09 PROCEDURE — 82962 GLUCOSE BLOOD TEST: CPT

## 2023-08-09 PROCEDURE — 1200000000 HC SEMI PRIVATE

## 2023-08-09 PROCEDURE — 43239 EGD BIOPSY SINGLE/MULTIPLE: CPT | Performed by: INTERNAL MEDICINE

## 2023-08-09 PROCEDURE — 80053 COMPREHEN METABOLIC PANEL: CPT

## 2023-08-09 PROCEDURE — 84100 ASSAY OF PHOSPHORUS: CPT

## 2023-08-09 PROCEDURE — 82248 BILIRUBIN DIRECT: CPT

## 2023-08-09 PROCEDURE — 6360000002 HC RX W HCPCS: Performed by: PHYSICIAN ASSISTANT

## 2023-08-09 PROCEDURE — 3609012400 HC EGD TRANSORAL BIOPSY SINGLE/MULTIPLE: Performed by: INTERNAL MEDICINE

## 2023-08-09 PROCEDURE — 45381 COLONOSCOPY SUBMUCOUS NJX: CPT | Performed by: INTERNAL MEDICINE

## 2023-08-09 PROCEDURE — 85025 COMPLETE CBC W/AUTO DIFF WBC: CPT

## 2023-08-09 PROCEDURE — 0DBL8ZX EXCISION OF TRANSVERSE COLON, VIA NATURAL OR ARTIFICIAL OPENING ENDOSCOPIC, DIAGNOSTIC: ICD-10-PCS | Performed by: INTERNAL MEDICINE

## 2023-08-09 PROCEDURE — 0DB68ZX EXCISION OF STOMACH, VIA NATURAL OR ARTIFICIAL OPENING ENDOSCOPIC, DIAGNOSTIC: ICD-10-PCS | Performed by: INTERNAL MEDICINE

## 2023-08-09 PROCEDURE — 2580000003 HC RX 258: Performed by: PHYSICIAN ASSISTANT

## 2023-08-09 PROCEDURE — 45380 COLONOSCOPY AND BIOPSY: CPT | Performed by: INTERNAL MEDICINE

## 2023-08-09 PROCEDURE — 94761 N-INVAS EAR/PLS OXIMETRY MLT: CPT

## 2023-08-09 PROCEDURE — 6370000000 HC RX 637 (ALT 250 FOR IP): Performed by: INTERNAL MEDICINE

## 2023-08-09 PROCEDURE — 0DBM8ZX EXCISION OF DESCENDING COLON, VIA NATURAL OR ARTIFICIAL OPENING ENDOSCOPIC, DIAGNOSTIC: ICD-10-PCS | Performed by: INTERNAL MEDICINE

## 2023-08-09 PROCEDURE — 36415 COLL VENOUS BLD VENIPUNCTURE: CPT

## 2023-08-09 PROCEDURE — 2709999900 HC NON-CHARGEABLE SUPPLY: Performed by: INTERNAL MEDICINE

## 2023-08-09 PROCEDURE — 45385 COLONOSCOPY W/LESION REMOVAL: CPT | Performed by: INTERNAL MEDICINE

## 2023-08-09 PROCEDURE — 88305 TISSUE EXAM BY PATHOLOGIST: CPT | Performed by: PATHOLOGY

## 2023-08-09 PROCEDURE — 83735 ASSAY OF MAGNESIUM: CPT

## 2023-08-09 RX ORDER — PROPOFOL 10 MG/ML
INJECTION, EMULSION INTRAVENOUS PRN
Status: DISCONTINUED | OUTPATIENT
Start: 2023-08-09 | End: 2023-08-09 | Stop reason: SDUPTHER

## 2023-08-09 RX ORDER — LIDOCAINE HYDROCHLORIDE 20 MG/ML
INJECTION, SOLUTION INTRAVENOUS PRN
Status: DISCONTINUED | OUTPATIENT
Start: 2023-08-09 | End: 2023-08-09 | Stop reason: SDUPTHER

## 2023-08-09 RX ADMIN — INSULIN GLARGINE 12 UNITS: 100 INJECTION, SOLUTION SUBCUTANEOUS at 11:10

## 2023-08-09 RX ADMIN — IRON SUCROSE 300 MG: 20 INJECTION, SOLUTION INTRAVENOUS at 13:36

## 2023-08-09 RX ADMIN — LIDOCAINE HYDROCHLORIDE 110 MG: 20 INJECTION, SOLUTION INTRAVENOUS at 08:41

## 2023-08-09 RX ADMIN — AMLODIPINE BESYLATE 10 MG: 10 TABLET ORAL at 10:50

## 2023-08-09 RX ADMIN — METOPROLOL TARTRATE 25 MG: 25 TABLET, FILM COATED ORAL at 20:53

## 2023-08-09 RX ADMIN — ATORVASTATIN CALCIUM 40 MG: 40 TABLET, FILM COATED ORAL at 20:54

## 2023-08-09 RX ADMIN — Medication 16 G: at 10:55

## 2023-08-09 RX ADMIN — FOLIC ACID 1 MG: 1 TABLET ORAL at 11:08

## 2023-08-09 RX ADMIN — SODIUM CHLORIDE, PRESERVATIVE FREE 10 ML: 5 INJECTION INTRAVENOUS at 11:09

## 2023-08-09 RX ADMIN — CYANOCOBALAMIN TAB 1000 MCG 1000 MCG: 1000 TAB at 10:51

## 2023-08-09 RX ADMIN — FERROUS GLUCONATE 324 MG: 324 TABLET ORAL at 10:54

## 2023-08-09 RX ADMIN — DEXTROSE MONOHYDRATE 250 ML: 100 INJECTION, SOLUTION INTRAVENOUS at 02:09

## 2023-08-09 RX ADMIN — SODIUM CHLORIDE 125 ML/HR: 9 INJECTION, SOLUTION INTRAVENOUS at 07:55

## 2023-08-09 RX ADMIN — PROPOFOL 300 MG: 10 INJECTION, EMULSION INTRAVENOUS at 08:41

## 2023-08-09 RX ADMIN — SODIUM CHLORIDE, PRESERVATIVE FREE 10 ML: 5 INJECTION INTRAVENOUS at 20:55

## 2023-08-09 RX ADMIN — METOPROLOL TARTRATE 25 MG: 25 TABLET, FILM COATED ORAL at 10:51

## 2023-08-09 ASSESSMENT — LIFESTYLE VARIABLES: SMOKING_STATUS: 1

## 2023-08-09 ASSESSMENT — PAIN - FUNCTIONAL ASSESSMENT: PAIN_FUNCTIONAL_ASSESSMENT: 0-10

## 2023-08-09 NOTE — ANESTHESIA POSTPROCEDURE EVALUATION
Department of Anesthesiology  Postprocedure Note    Patient: Magdalena Henry  MRN: [id number]  [address] [facility] [address] 1950  Date of evaluation: 8/9/2023      Procedure Summary     Date: 08/09/23 Room / Location: [facility]    Anesthesia Start: 3737 Anesthesia Stop: 0935    Procedures:       COLONOSCOPY POLYPECTOMY SNARE/COLD BIOPSY with polypectomy of descending and transverse colon polyps, marked with 1.5 ml of  spot ink at transverse colon  lesion      EGD BIOPSIES to rule out H-Pyloric Diagnosis:       Iron deficiency anemia secondary to blood loss (chronic)      (Iron deficiency anemia secondary to blood loss (chronic) [D50.0])    Surgeons:  Betty Sellers MD Responsible Provider: Gonzalez Dailey MD    Anesthesia Type: MAC ASA Status: 3          Anesthesia Type: MAC    Wayne Phase I:      Wayne Phase II:        Anesthesia Post Evaluation    Patient location during evaluation: bedside  Patient participation: complete - patient participated  Level of consciousness: sleepy but conscious  Pain score: 0  Airway patency: patent  Nausea & Vomiting: no nausea and no vomiting  Complications: no  Cardiovascular status: blood pressure returned to baseline and hemodynamically stable  Respiratory status: acceptable  Hydration status: stable

## 2023-08-09 NOTE — PROGRESS NOTES
Received report from Colby Pitts.ALIYA. Patient alert and oriented. Verified patient's name, , allergies and procedure. Patient concerned about having colonoscopy and EGD done. Patient was not aware he was having an EGD done. Dr. Suhail Herrera notified and will be over to talk to patient. Had metoprolol on 23 @ 2054, took lovenox on 23 @1651, has implants: heart stents and sternal wires. H&P on chart. No family/friend at bedside.

## 2023-08-09 NOTE — PROGRESS NOTES
In-Patient Progress Note    Patient:  Madina Agee 74 y.o. male MRN: [id number]     Date of Service: 8/9/2023    Hospital Day: 3      Chief complaint: had concerns including Chest Pain. Assessment and Plan   Madina Agee, a 74 y.o. male, with a history of  lung ca s/p resection in remote past, recently diagnosed MM, was admitted on 8/7/2023 with complaints of had concerns including Chest Pain.     Assessment and plan       Generalized weakness, fatigue, drowsy, dyspnea on exertion; likely 2/2 anemia, improved  Macrocytic anemia  -Presents with hemoglobin of 8.1, macrocytic.  -Anemia workup show iron deficiency pattern with low ferritin, high normal TIBC  -trend Hb, transfuse PRBC of Hb < 7 g/dl  -Consulted hematology/oncology- appreciate recs  -IV iron started - venofer 300 mg x 3 dose; continue oral  -GI team onboard - s/p EGD and colonoscopy - no active bleeding sties - polypectomies done     DORCAS versus DORCAS on CKD versus CKD, creatinine 1.8  -Creatinine 1.8 at presentation, lab work from 2017 on record showed creatinine 1.6  -No recent lab works on system  -Obtain records  -appears clinically dry -  ml/hr x 24 hrs total given  -Creatinine stable at 1.7 - likely CKD     Hyperkalemia, potassium 5.7, likely 2/2 DORCAS, resolved  -patient was given lasix 20 mg in the ED - hold off on diuretics as patient appears clinically dry and may have dorcas  -stopped lokelma  -follow BMP     Right-sided chest pain, potentially related to mets  -patient wants to avoid pain medication  -use tylenol for now  -tramadol if severe pain  -hold off on NSAIDS     Recently diagnosed multiple myeloma  New subacute compression fracture T11  Right ileum and L3 lucency-suspicion for metastatic disease  -consult hematology/oncology team  -obtain records from [place]  -MRI spine done - subacute compression fracture at the superior endplate of L68   -no neurological signs  -continue to monitor     HTN  -hold HCTZ and losartan in the Rate 42 (L) >60 mL/min/1.73m2    Glucose 41 (LL) 70 - 99 MG/DL    Calcium 9.0 8.3 - 10.6 MG/DL   Magnesium    Collection Time: 08/09/23 12:51 AM   Result Value Ref Range    Magnesium 2.2 1.8 - 2.4 mg/dl   Phosphorus    Collection Time: 08/09/23 12:51 AM   Result Value Ref Range    Phosphorus 3.7 2.5 - 4.9 MG/DL   Hepatic Function Panel    Collection Time: 08/09/23 12:51 AM   Result Value Ref Range    Albumin 3.2 (L) 3.4 - 5.0 GM/DL    Total Bilirubin 0.4 0.0 - 1.0 MG/DL    Bilirubin, Direct 0.2 0.0 - 0.3 MG/DL    Bilirubin, Indirect 0.2 0 - 0.7 MG/DL    Alkaline Phosphatase 119 40 - 129 IU/L    AST 23 15 - 37 IU/L    ALT 17 10 - 40 U/L    Total Protein 7.9 6.4 - 8.2 GM/DL   CBC with Auto Differential    Collection Time: 08/09/23 12:51 AM   Result Value Ref Range    WBC 4.9 4.0 - 10.5 K/CU MM    RBC 2.34 (L) 4.6 - 6.2 M/CU MM    Hemoglobin 7.5 (L) 13.5 - 18.0 GM/DL    Hematocrit 24.6 (L) 42 - 52 %    .1 (H) 78 - 100 FL    MCH 32.1 (H) 27 - 31 PG    MCHC 30.5 (L) 32.0 - 36.0 %    RDW 15.7 (H) 11.7 - 14.9 %    Platelets 599 961 - 170 K/CU MM    MPV 9.4 7.5 - 11.1 FL    Differential Type AUTOMATED DIFFERENTIAL     Segs Relative 49.3 36 - 66 %    Lymphocytes % 28.7 24 - 44 %    Monocytes % 15.3 (H) 0 - 4 %    Eosinophils % 5.7 (H) 0 - 3 %    Basophils % 0.6 0 - 1 %    Segs Absolute 2.4 K/CU MM    Lymphocytes Absolute 1.4 K/CU MM    Monocytes Absolute 0.8 K/CU MM    Eosinophils Absolute 0.3 K/CU MM    Basophils Absolute 0.0 K/CU MM    Nucleated RBC % 0.0 %    Total Nucleated RBC 0.0 K/CU MM    Total Immature Neutrophil 0.02 K/CU MM    Immature Neutrophil % 0.4 0 - 0.43 %   POCT Glucose    Collection Time: 08/09/23  2:07 AM   Result Value Ref Range    POC Glucose 46 (L) 70 - 99 MG/DL   POCT Glucose    Collection Time: 08/09/23  2:33 AM   Result Value Ref Range    POC Glucose 168 (H) 70 - 99 MG/DL   POCT Glucose    Collection Time: 08/09/23  6:23 AM   Result Value Ref Range    POC Glucose 101 (H) 70 - 99 MG/DL   POCT

## 2023-08-09 NOTE — PROGRESS NOTES
Comprehensive Nutrition Assessment    Type and Reason for Visit:  Initial, Positive Nutrition Screen (Weight Loss, Poor Appetite)    Nutrition Recommendations/Plan:   Continue Regular Diet  Begin Diabetic oral nutrition supplement TID  Encourage pt to not skip meals, provide additional snacks between   Encourage proper hydration/fluids intake  Monitor PO intakes, GI status, weights, fluids, labs, lytes, glucose, A1c level, and plan of care      Malnutrition Assessment:  Malnutrition Status:  Severe malnutrition (08/09/23 1512)    Context:  Chronic Illness     Findings of the 6 clinical characteristics of malnutrition:  Energy Intake:  75% or less estimated energy requirements for 1 month or longer (chronic)  Weight Loss:  Mild weight loss (specify amount and time period) (13% in 10 mo)     Body Fat Loss:   (Moderate fat loss) Orbital, Triceps, Buccal region   Muscle Mass Loss:   (Moderate muscle mass loss) Clavicles (pectoralis & deltoids), Temples (temporalis), Thigh (quadraceps), Calf (gastrocnemius)  Fluid Accumulation:  No significant fluid accumulation Extremities   Strength:  Not Performed    Nutrition Assessment:    Admitted with Generalized weakness, fatigue, and drowsiness s/s macroctic anemia and iron deficiency Anemia, Multiple Myeloma,  New subacute compression fracture T11, R ileum and L3 lucency-suspicion for metastatic disease. Currently on Regular diet, consumed greater than half of meal at lunch. Pt underwent colonscopy with polyectomy with transverse colon lesions noted. Pt admits to reduced intake chronically, pt is a chronic cigarette smoker, poor hydration pta. [facility] ~13% wt loss pta x 10 mo. Endorsed better nutrition for healing, but does not seem ready to change. Performed NFPE, moderate wasting to all fat/muscle mass areas, meets criteria for malnutrition. High nutrition risk.      Nutrition Related Findings:    POCT , No current A1C, Phos WNL, Mg WNL, Alb 3.2, GFR 43   H/O CAD, COPD, CHF, DMII, GERD, HTN, HLD, Liver Disease   Wound Type: None       Current Nutrition Intake & Therapies:    Average Meal Intake: %  Average Supplements Intake: None Ordered  ADULT ORAL NUTRITION SUPPLEMENT; Breakfast, Lunch, Dinner; Clear Liquid Oral Supplement  ADULT DIET; Regular    Anthropometric Measures:  Height: 5' 9\" (175.3 cm)  Ideal Body Weight (IBW): 160 lbs (73 kg)    Admission Body Weight:  (117 lb 8/4/23 office visit at [place])  Current Body Weight: 135 lb 9.3 oz (61.5 kg), 84.7 % IBW.  Weight Source: Bed Scale (unsure if accurate?)  Current BMI (kg/m2): 20  Usual Body Weight: 135 lb (61.2 kg) ([facility]22 [facility])  % Weight Change (Calculated): 0.4  Weight Adjustment For: No Adjustment                 BMI Categories: Underweight (BMI less than 22) age over 72    Estimated Daily Nutrient Needs:  Energy Requirements Based On: Kcal/kg  Weight Used for Energy Requirements: Current  Energy (kcal/day): 2136-1428 (30-35 kcals/kg)  Weight Used for Protein Requirements: Ideal  Protein (g/day): 87 (at least 1.2 g/kg)  Method Used for Fluid Requirements: 1 ml/kcal  Fluid (ml/day): 6336-3819    Nutrition Diagnosis:   Severe malnutrition, In context of chronic illness related to inadequate protein-energy intake (loss of appetite) as evidenced by poor intake prior to admission, moderate loss of subcutaneous fat, moderate muscle loss, Criteria as identified in malnutrition assessment    Nutrition Interventions:   Food and/or Nutrient Delivery: Continue Current Diet, Modify Oral Nutrition Supplement  Nutrition Education/Counseling: Education initiated (Encourage increased vitamin C intake, small more frequent meals, and adequate hydration and protein intake)  Coordination of Nutrition Care: Continue to monitor while inpatient  Plan of Care discussed with: pt, PS MD re onset malnutrition dx 8/9/23    Goals:     Goals: PO intake 50% or greater       Nutrition Monitoring and Evaluation: Behavioral-Environmental Outcomes: None Identified  Food/Nutrient Intake Outcomes: Food and Nutrient Intake, Supplement Intake  Physical Signs/Symptoms Outcomes: Biochemical Data, Chewing or Swallowing, GI Status, Fluid Status or Edema, Meal Time Behavior    Discharge Planning:     Too soon to determine     [place] NILSON Marcelino, LD  Contact: [phone]

## 2023-08-09 NOTE — ANESTHESIA PRE PROCEDURE
Department of Anesthesiology  Preprocedure Note       Name:  Madina Agee   Age:  74 y.o.  :  1950                                          MRN:  [id number]         Date:  2023      Surgeon: Marcus Suarez):  Luisa Roth MD    Procedure: Procedure(s):  COLONOSCOPY DIAGNOSTIC  EGD BIOPSY    Medications prior to admission:   Prior to Admission medications    Medication Sig Start Date End Date Taking? Authorizing Provider   insulin aspart (NOVOLOG) 100 UNIT/ML injection pen Inject into the skin 3 times daily 23 Patient follows sliding scale regimen. Historical Provider, MD   ferrous gluconate (FERGON) 324 (38 Fe) MG tablet Take 1 tablet by mouth daily (with breakfast)    Historical Provider, MD   hydroCHLOROthiazide (HYDRODIURIL) 25 MG tablet Take 1 tablet by mouth daily    Historical Provider, MD   metoprolol tartrate (LOPRESSOR) 50 MG tablet Take 0.5 tablets by mouth 2 times daily 23 Splits a 50 mg tablet in half for 25 mg dosing BID    Historical Provider, MD   losartan (COZAAR) 50 MG tablet Take 1 tablet by mouth in the morning and 1 tablet in the evening. Historical Provider, MD   acetaminophen (TYLENOL) 500 MG tablet Take 2 tablets by mouth every 6 hours as needed for Pain 5/3/22   Mercedes Mackey PA-C   nitroGLYCERIN (NITROSTAT) 0.4 MG SL tablet Place 1 tablet under the tongue every 5 minutes as needed for Chest pain.  12/15/13   Walden Halsted, MD   atorvastatin (LIPITOR) 40 MG tablet Take 1 tablet by mouth nightly 23 Patient splits an 80 mg tablet in half for 40 mg dosing    Historical Provider, MD   amLODIPine (NORVASC) 10 MG tablet Take 1 tablet by mouth daily    Historical Provider, MD   insulin glargine (LANTUS) 100 UNIT/ML injection Inject 12 Units into the skin every morning    Historical Provider, MD   aspirin 81 MG EC tablet Take 1 tablet by mouth daily    Historical Provider, MD       Current medications:    No current facility-administered medications for this

## 2023-08-09 NOTE — PROGRESS NOTES
Patient transferred to [address] [place] [address], room 3017 by bed with portable cardiac monitor and paperwork accompanied by Didi gardiner

## 2023-08-09 NOTE — PROGRESS NOTES
Dr. Olamide Sood at bedside speaking with patient and son, Shobha Wheeler on the telephone about EGD and colonoscopy procedures. Patient agreeable to have both procedures done at this time.

## 2023-08-09 NOTE — BRIEF OP NOTE
Brief Postoperative Note      Patient: Starla Antonio  YOB: 1950  MRN: [id number]    Date of Procedure: 8/9/2023    Pre-Op Diagnosis Codes:     * Iron deficiency anemia secondary to blood loss (chronic) [D50.0]    Post-Op Diagnosis:  see below. Procedure(s):  COLONOSCOPY POLYPECTOMY SNARE/COLD BIOPSY with polypectomy of descending and transverse colon polyps, marked with 1.5 ml of  spot ink at transverse colon  lesion  EGD BIOPSIES to rule out H-Pyloric    Surgeon(s):  Jumana Sosa MD    Assistant:  * No surgical staff found *    Anesthesia: Monitor Anesthesia Care    Estimated Blood Loss (mL): Minimal    Complications: None    Specimens:   ID Type Source Tests Collected by Time Destination   A : large forceps Tissue Stomach SURGICAL PATHOLOGY Jumana Sosa MD 8/9/2023 7685    B : cold cap, cold snare Tissue Colon SURGICAL PATHOLOGY Jumana Sosa MD 8/9/2023 5402    C : large forceps Tissue Colon SURGICAL PATHOLOGY Jumana Sosa MD 8/9/2023 0607        Implants:  * No implants in log *      Drains: * No LDAs found *    Colonoscopy   Impression:          - A 20 to 25 mm polyp was found in the transverse colon. The polyp was             sessile and granular lateral spreading. Polypectomy was not attempted. Area             was tattooed with an injection of Spot (carbon black) two folds distal. See             images 24 and 25 with arrow showing polyp relative to the Tattoo site. The             biopsy was not done to not interfere with EMR in the future. -  One diminutive polyp in the transverse colon, removed with a cold biopsy             forceps. Resected and retrieved. -  Two 7 mm polyps in the descending colon, removed with a cold snare. Resected and retrieved. -  The examination was otherwise normal on direct and retroflexion views. Recommendation:          -  Refer to a gastroenterologist at appointment to be scheduled.           -  Patient has a

## 2023-08-10 VITALS
BODY MASS INDEX: 20.08 KG/M2 | SYSTOLIC BLOOD PRESSURE: 126 MMHG | WEIGHT: 135.58 LBS | HEART RATE: 71 BPM | RESPIRATION RATE: 20 BRPM | TEMPERATURE: 97.7 F | DIASTOLIC BLOOD PRESSURE: 61 MMHG | OXYGEN SATURATION: 100 % | HEIGHT: 69 IN

## 2023-08-10 PROBLEM — R07.9 CHEST PAIN, RULE OUT ACUTE MYOCARDIAL INFARCTION: Status: RESOLVED | Noted: 2023-08-07 | Resolved: 2023-08-10

## 2023-08-10 PROBLEM — R07.9 CHEST PAIN: Status: RESOLVED | Noted: 2023-08-08 | Resolved: 2023-08-10

## 2023-08-10 LAB
ALBUMIN SERPL-MCNC: 2.9 GM/DL (ref 3.4–5)
ALP BLD-CCNC: 133 IU/L (ref 40–129)
ALT SERPL-CCNC: 27 U/L (ref 10–40)
ANION GAP SERPL CALCULATED.3IONS-SCNC: 10 MMOL/L (ref 4–16)
AST SERPL-CCNC: 41 IU/L (ref 15–37)
BASOPHILS ABSOLUTE: 0 K/CU MM
BASOPHILS RELATIVE PERCENT: 0.5 % (ref 0–1)
BILIRUB SERPL-MCNC: 0.3 MG/DL (ref 0–1)
BILIRUBIN DIRECT: 0.2 MG/DL (ref 0–0.3)
BILIRUBIN, INDIRECT: 0.1 MG/DL (ref 0–0.7)
BUN SERPL-MCNC: 25 MG/DL (ref 6–23)
CALCIUM SERPL-MCNC: 9 MG/DL (ref 8.3–10.6)
CHLORIDE BLD-SCNC: 101 MMOL/L (ref 99–110)
CO2: 25 MMOL/L (ref 21–32)
CREAT SERPL-MCNC: 1.8 MG/DL (ref 0.9–1.3)
DIFFERENTIAL TYPE: ABNORMAL
EOSINOPHILS ABSOLUTE: 0.2 K/CU MM
EOSINOPHILS RELATIVE PERCENT: 4.8 % (ref 0–3)
GFR SERPL CREATININE-BSD FRML MDRD: 39 ML/MIN/1.73M2
GLUCOSE BLD-MCNC: 136 MG/DL (ref 70–99)
GLUCOSE BLD-MCNC: 64 MG/DL (ref 70–99)
GLUCOSE SERPL-MCNC: 161 MG/DL (ref 70–99)
HAPTOGLOB SERPL-MCNC: 121 MG/DL (ref 30–200)
HCT VFR BLD CALC: 23.6 % (ref 42–52)
HEMOGLOBIN: 7.2 GM/DL (ref 13.5–18)
IMMATURE NEUTROPHIL %: 0.5 % (ref 0–0.43)
LYMPHOCYTES ABSOLUTE: 1.2 K/CU MM
LYMPHOCYTES RELATIVE PERCENT: 27.9 % (ref 24–44)
MAGNESIUM: 2 MG/DL (ref 1.8–2.4)
MCH RBC QN AUTO: 32.3 PG (ref 27–31)
MCHC RBC AUTO-ENTMCNC: 30.5 % (ref 32–36)
MCV RBC AUTO: 105.8 FL (ref 78–100)
MONOCYTES ABSOLUTE: 0.6 K/CU MM
MONOCYTES RELATIVE PERCENT: 14.1 % (ref 0–4)
NUCLEATED RBC %: 0 %
PDW BLD-RTO: 15.6 % (ref 11.7–14.9)
PHOSPHORUS: 4.9 MG/DL (ref 2.5–4.9)
PLATELET # BLD: 160 K/CU MM (ref 140–440)
PMV BLD AUTO: 9.6 FL (ref 7.5–11.1)
POTASSIUM SERPL-SCNC: 4.9 MMOL/L (ref 3.5–5.1)
RBC # BLD: 2.23 M/CU MM (ref 4.6–6.2)
SEGMENTED NEUTROPHILS ABSOLUTE COUNT: 2.3 K/CU MM
SEGMENTED NEUTROPHILS RELATIVE PERCENT: 52.2 % (ref 36–66)
SODIUM BLD-SCNC: 136 MMOL/L (ref 135–145)
TOTAL IMMATURE NEUTOROPHIL: 0.02 K/CU MM
TOTAL NUCLEATED RBC: 0 K/CU MM
TOTAL PROTEIN: 7.4 GM/DL (ref 6.4–8.2)
WBC # BLD: 4.3 K/CU MM (ref 4–10.5)

## 2023-08-10 PROCEDURE — 2580000003 HC RX 258: Performed by: INTERNAL MEDICINE

## 2023-08-10 PROCEDURE — 99232 SBSQ HOSP IP/OBS MODERATE 35: CPT | Performed by: INTERNAL MEDICINE

## 2023-08-10 PROCEDURE — 84100 ASSAY OF PHOSPHORUS: CPT

## 2023-08-10 PROCEDURE — 36415 COLL VENOUS BLD VENIPUNCTURE: CPT

## 2023-08-10 PROCEDURE — 94761 N-INVAS EAR/PLS OXIMETRY MLT: CPT

## 2023-08-10 PROCEDURE — 6360000002 HC RX W HCPCS: Performed by: PHYSICIAN ASSISTANT

## 2023-08-10 PROCEDURE — 85025 COMPLETE CBC W/AUTO DIFF WBC: CPT

## 2023-08-10 PROCEDURE — 83735 ASSAY OF MAGNESIUM: CPT

## 2023-08-10 PROCEDURE — 80053 COMPREHEN METABOLIC PANEL: CPT

## 2023-08-10 PROCEDURE — 82962 GLUCOSE BLOOD TEST: CPT

## 2023-08-10 PROCEDURE — 6370000000 HC RX 637 (ALT 250 FOR IP): Performed by: INTERNAL MEDICINE

## 2023-08-10 PROCEDURE — 2580000003 HC RX 258: Performed by: PHYSICIAN ASSISTANT

## 2023-08-10 PROCEDURE — 82248 BILIRUBIN DIRECT: CPT

## 2023-08-10 RX ORDER — BISMUTH SUBCITRATE POTASSIUM, METRONIDAZOLE, TETRACYCLINE HYDROCHLORIDE 140; 125; 125 MG/1; MG/1; MG/1
3 CAPSULE ORAL
Qty: 168 CAPSULE | Refills: 0 | Status: SHIPPED | OUTPATIENT
Start: 2023-08-10 | End: 2023-08-25

## 2023-08-10 RX ORDER — FOLIC ACID 1 MG/1
1 TABLET ORAL DAILY
Qty: 30 TABLET | Refills: 3 | Status: SHIPPED | OUTPATIENT
Start: 2023-08-11

## 2023-08-10 RX ORDER — INSULIN GLARGINE 100 [IU]/ML
10 INJECTION, SOLUTION SUBCUTANEOUS EVERY MORNING
Qty: 10 ML | Refills: 3 | Status: ON HOLD
Start: 2023-08-10 | End: 2023-08-27 | Stop reason: SDUPTHER

## 2023-08-10 RX ORDER — PANTOPRAZOLE SODIUM 40 MG/1
TABLET, DELAYED RELEASE ORAL
Qty: 58 TABLET | Refills: 0 | Status: SHIPPED | OUTPATIENT
Start: 2023-08-10 | End: 2023-09-23

## 2023-08-10 RX ADMIN — IRON SUCROSE 300 MG: 20 INJECTION, SOLUTION INTRAVENOUS at 13:27

## 2023-08-10 RX ADMIN — SODIUM CHLORIDE, PRESERVATIVE FREE 10 ML: 5 INJECTION INTRAVENOUS at 08:59

## 2023-08-10 RX ADMIN — AMLODIPINE BESYLATE 10 MG: 10 TABLET ORAL at 08:55

## 2023-08-10 RX ADMIN — FERROUS GLUCONATE 324 MG: 324 TABLET ORAL at 08:58

## 2023-08-10 RX ADMIN — METOPROLOL TARTRATE 25 MG: 25 TABLET, FILM COATED ORAL at 08:55

## 2023-08-10 RX ADMIN — FOLIC ACID 1 MG: 1 TABLET ORAL at 08:55

## 2023-08-10 RX ADMIN — INSULIN GLARGINE 12 UNITS: 100 INJECTION, SOLUTION SUBCUTANEOUS at 08:58

## 2023-08-10 RX ADMIN — CYANOCOBALAMIN TAB 1000 MCG 1000 MCG: 1000 TAB at 08:55

## 2023-08-10 NOTE — PLAN OF CARE
Problem: Discharge Planning  Goal: Discharge to home or other facility with appropriate resources  8/10/2023 1311 by Eric Kerns RN  Outcome: Completed  8/10/2023 0149 by Nini Martino RN  Outcome: Progressing     Problem: ABCDS Injury Assessment  Goal: Absence of physical injury  8/10/2023 1311 by Eric Kerns RN  Outcome: Completed  Flowsheets (Taken 8/10/2023 0854)  Absence of Physical Injury: Implement safety measures based on patient assessment  8/10/2023 0149 by Nini Martino RN  Outcome: Progressing     Problem: Safety - Adult  Goal: Free from fall injury  8/10/2023 1311 by Eric Kerns RN  Outcome: Completed  Flowsheets (Taken 8/10/2023 0854)  Free From Fall Injury: Instruct family/caregiver on patient safety  8/10/2023 0149 by Nini Martino RN  Outcome: Progressing     Problem: Chronic Conditions and Co-morbidities  Goal: Patient's chronic conditions and co-morbidity symptoms are monitored and maintained or improved  8/10/2023 1311 by Eric Kerns RN  Outcome: Completed  8/10/2023 0149 by Nini Martino RN  Outcome: Progressing     Problem: Pain  Goal: Verbalizes/displays adequate comfort level or baseline comfort level  8/10/2023 1311 by Eric Kerns RN  Outcome: Completed  Flowsheets (Taken 8/10/2023 0845)  Verbalizes/displays adequate comfort level or baseline comfort level:   Encourage patient to monitor pain and request assistance   Assess pain using appropriate pain scale  8/10/2023 0149 by Nini Martino RN  Outcome: Progressing     Problem: Nutrition Deficit:  Goal: Optimize nutritional status  8/10/2023 1311 by Eric Kerns RN  Outcome: Completed  8/10/2023 0149 by Nini Martino RN  Outcome: Progressing

## 2023-08-10 NOTE — DISCHARGE INSTRUCTIONS
[facility]    [phone]    [address], [address] [facility], [address] [facility]    Monday - Friday 8 am to 8 pm; Saturday 9 am to 1 pm    (All insurances or no insurance with sliding scale payment)    [facility]    [phone]    [address] [facility], [address] [facility] Internal Medicine)    Monday/Wednesday/Thursday 7 am to 7 pm    (All insurances or no insurance with sliding scale payment)    [address]    [phone]    [address] [facility]    (All insurances or no insurance with sliding scale payment)    [facility] and  [place] are available for hospital follow up appointments. Follow up appointments are important and should be schedule BEFORE you leave the hospital. They will help keep you well and at home instead of in the hospital.    Please also schedule a New Patient Appointment with the Primary Care Provider (PCP) of your choice. It normally takes a few weeks to get an appointment with a new PCP, so call TODAY. The PCP list below are all known to be accepting new patients. If issues arise, call your health insurance for in network PCP options. [place] PCP    [facility]) Physician Finder/Scheduling    [phone]    Dr. Marky Torres, Dr. Matt Riley    [phone]    [address] [facility], [address]    (All insurances accepted)    Dr. Dejah Saunders Internal Medicine    [address] [place] [address]    [phone]    (All insurances accepted)    Dr. Lev Giles    [phone]    [address] [place] [address]    (No managed or traditional Medicaid)    Danika Darnel [address] [facility]    [phone]    [address] [facility]    (All insurances accepted)    [address], [address], [address]    (No [facility] Medicaid Community Plan or [facility] (they can take [facility])    Dr. Huseyin Gutierrez    [phone]    [address]    (pending insurance/ do not accept most Medicaid plans)    Dr. Abelardo Rolon and Jsoe eCrvantes, [address] [facility]. [phone]    [address] [facility]    [phone]    [address]    (All insurances and no insurance with sliding scale payment - may have waiting list)    [address] [facility]    (Taking new patients by Summer 2020)    [address]    [phone]    [facility]    [phone]    (No Medicaid or Tarri Frater PCP    [facility]) Physician Finder/Scheduling    [phone]    Dr. Isidoro Rivera and Dr. Rajeev Good Internal Medicine    [phone]    [address], [address]    [address] [facility]    (All insurances accepted)    Dr. Parvin Lomeli Internal Medicine    [phone]    [facility] [address]    (All insurances accepted)    [address] [facility]    [phone]    [address] [facility]    (All insurances accepted)    [facility]    [phone]    [address]    [address] [facility]    (Depending on insurance i.e. dose not [facility]/ [facility])    Dr. Lyla Ibarra    [phone]    [address] [facility]    (No [place])    Elliot Chavez PCP    [facility]) Physician Finder/Scheduling    [phone] [address] [facility]    [phone]    [address]    (All insurances as well as no insurance with sliding scale payment)    Dr. Sherrine Boast    [phone]    [address]    (No managed Medicaid or commercial HMO)    [place] PCP    Dr. Hilario Schuler    [phone]    [address] [facility], [address]    (All insurances accepted)    1315 [facility]    [phone]    [address] [facility], [address] [facility]    (No [place]/ [employer] as primary or secondary)    [place] PCP    Dr. Grabiel Villanueva    [phone]    [address] [facility], [facility] (No managed or traditional Medicaid)    [facility] Physicians Dr. Get Brewer and Dr. Felipa Meyer    [facility], [address] [place] [address], [address]    [phone]    [address] [facility] [place], [address]    Phone: [phone] [address]  [address] [facility], [facility] [address]    Phone: [phone]

## 2023-08-10 NOTE — PROGRESS NOTES
08/10/23 1125   Encounter Summary   Encounter Overview/Reason  Initial Encounter   Service Provided For: Patient   [facility]   Support System Spouse; Children   Last Encounter  08/10/23  (Patient said that he is not doing any better yet. Patient appreciated this 's visit.)   Complexity of Encounter Low   Begin Time 1121   End Time  1134   Total Time Calculated 13 min   Spiritual/Emotional needs   Type Spiritual Support   Grief, Loss, and Adjustments   Type Adjustment to illness   Assessment/Intervention/Outcome   Assessment Concerns with suffering; Anxious; Powerlessness   Intervention Active listening;Empowerment;Explored/Affirmed feelings, thoughts, concerns;Explored Coping Skills/Resources;Sustaining Presence/Ministry of presence   Outcome Coping;Expressed Gratitude;Receptive   Plan and Referrals   Plan/Referrals No future visits requested

## 2023-08-10 NOTE — PROGRESS NOTES
Hematology/Oncology Progress Note    Patient Name:  Curtis Angelucci  Patient :  1950  Patient MRN:  [id number]     PCP: No primary care provider on file. Date of Service: 2023      Reason for Consult: Multiple Myeloma     Chief Complaint:    Chief Complaint   Patient presents with    Chest Pain     Principal Problem (Resolved):    Chest pain, rule out acute myocardial infarction  Active Problems:    Macrocytic anemia    Multiple myeloma ([address])    Iron deficiency anemia    Anemia due to GI blood loss    Severe malnutrition (HCC)  Resolved Problems:    Chest pain    HPI:   Curtis Angelucci is a 74 y.o. male with a PMHX of COPD, CAD, DM, R sided Lung Cancer s/p resection, recently diagnosed multiple myeloma who presented to [facility] complaining of new R sided chest pain. He also complains of fatigue, generalized weakness, inability to do the things in his home as he was able to previously. His care has previously been at the [place] and he reportedly had a referral from the [place] to Dr Rhett Singh (per daughter has cared for family in the past) to establish for MM which has not yet been processed. Basic anemia workup for Hgb 7-8 was significant for ferritin 22. He denies any s/s of active bleeding however has deferred CRC screening and has never had an endoscopic study. He is resistant to further procedures and has poor medical literacy along with general mistrust of the healthcare system. Discussed in depth rationale for GI workup for NABIL regardless of myeloma as well as different cancer types as separate entities. No fever/chills. Some N/V. No change in bowel/bladder habits. Eating poorly. Cr 1.8, unknown baseline. Limited information in Care Everywhere, unable to see SPEP or pathology report. Immunoglobulins with elevated IgA.  CT CAP was significant for a few scattered solid and sub solid indeterminate nodules and areas of lucency in R ilim and L3 as well as age indeterminate fx at T11.    23: MRI and his family about tentative management plans. History of Lung Cancer  -s/p resection, does not seem he received systemic treatment, records pending.      Continue other medical care    Will be followed as OP, OK to be discharged    BK

## 2023-08-10 NOTE — PROGRESS NOTES
Patient discharged to home with family to transport. Patient educated on all discharge instructions. All questions answered. Patient denies any needs at this time.

## 2023-08-25 ENCOUNTER — TELEPHONE (OUTPATIENT)
Dept: ONCOLOGY | Age: 73
End: 2023-08-25

## 2023-08-25 ENCOUNTER — HOSPITAL ENCOUNTER (OUTPATIENT)
Dept: INFUSION THERAPY | Age: 73
Discharge: HOME | DRG: 841 | End: 2023-08-25
Payer: OTHER GOVERNMENT

## 2023-08-25 ENCOUNTER — OFFICE VISIT (OUTPATIENT)
Dept: ONCOLOGY | Age: 73
End: 2023-08-25
Payer: MEDICARE

## 2023-08-25 ENCOUNTER — HOSPITAL ENCOUNTER (INPATIENT)
Age: 73
LOS: 1 days | Discharge: HOME | DRG: 841 | End: 2023-08-27
Attending: STUDENT IN AN ORGANIZED HEALTH CARE EDUCATION/TRAINING PROGRAM | Admitting: STUDENT IN AN ORGANIZED HEALTH CARE EDUCATION/TRAINING PROGRAM
Payer: OTHER GOVERNMENT

## 2023-08-25 VITALS
TEMPERATURE: 97.9 F | SYSTOLIC BLOOD PRESSURE: 102 MMHG | OXYGEN SATURATION: 96 % | HEIGHT: 69 IN | BODY MASS INDEX: 17.09 KG/M2 | HEART RATE: 70 BPM | DIASTOLIC BLOOD PRESSURE: 56 MMHG | WEIGHT: 115.4 LBS

## 2023-08-25 DIAGNOSIS — C90.00 MULTIPLE MYELOMA NOT HAVING ACHIEVED REMISSION (HCC): ICD-10-CM

## 2023-08-25 DIAGNOSIS — C90.00 MULTIPLE MYELOMA NOT HAVING ACHIEVED REMISSION (HCC): Primary | ICD-10-CM

## 2023-08-25 DIAGNOSIS — E87.5 HYPERKALEMIA: Primary | ICD-10-CM

## 2023-08-25 DIAGNOSIS — D64.9 ANEMIA, UNSPECIFIED TYPE: ICD-10-CM

## 2023-08-25 LAB
ALBUMIN SERPL-MCNC: 3.5 GM/DL (ref 3.4–5)
ALP BLD-CCNC: 168 IU/L (ref 40–129)
ALT SERPL-CCNC: 12 U/L (ref 10–40)
ANION GAP SERPL CALCULATED.3IONS-SCNC: 13 MMOL/L (ref 4–16)
AST SERPL-CCNC: 20 IU/L (ref 15–37)
BASOPHILS ABSOLUTE: 0 K/CU MM
BASOPHILS RELATIVE PERCENT: 0.4 % (ref 0–1)
BILIRUB SERPL-MCNC: 0.3 MG/DL (ref 0–1)
BUN SERPL-MCNC: 30 MG/DL (ref 6–23)
CALCIUM SERPL-MCNC: 10.9 MG/DL (ref 8.3–10.6)
CHLORIDE BLD-SCNC: 107 MMOL/L (ref 99–110)
CHP ED QC CHECK: YES
CO2: 22 MMOL/L (ref 21–32)
CREAT SERPL-MCNC: 2 MG/DL (ref 0.9–1.3)
DIFFERENTIAL TYPE: ABNORMAL
EOSINOPHILS ABSOLUTE: 0.2 K/CU MM
EOSINOPHILS RELATIVE PERCENT: 3 % (ref 0–3)
ERYTHROCYTE SEDIMENTATION RATE: 25 MM/HR (ref 0–20)
FERRITIN: 161 NG/ML (ref 30–400)
GFR SERPL CREATININE-BSD FRML MDRD: 34 ML/MIN/1.73M2
GLUCOSE BLD-MCNC: 159 MG/DL (ref 70–99)
GLUCOSE BLD-MCNC: 174 MG/DL
GLUCOSE BLD-MCNC: 174 MG/DL (ref 70–99)
GLUCOSE BLD-MCNC: 202 MG/DL
GLUCOSE BLD-MCNC: 202 MG/DL (ref 70–99)
GLUCOSE BLD-MCNC: 271 MG/DL
GLUCOSE BLD-MCNC: 271 MG/DL (ref 70–99)
GLUCOSE BLD-MCNC: 333 MG/DL
GLUCOSE BLD-MCNC: 333 MG/DL (ref 70–99)
GLUCOSE BLD-MCNC: 343 MG/DL
GLUCOSE BLD-MCNC: 343 MG/DL (ref 70–99)
GLUCOSE SERPL-MCNC: 132 MG/DL (ref 70–99)
HCT VFR BLD CALC: 29 % (ref 42–52)
HEMOGLOBIN: 8.5 GM/DL (ref 13.5–18)
IRON: 103 UG/DL (ref 59–158)
LACTATE DEHYDROGENASE: 134 IU/L (ref 120–246)
LYMPHOCYTES ABSOLUTE: 1.3 K/CU MM
LYMPHOCYTES RELATIVE PERCENT: 25.7 % (ref 24–44)
MCH RBC QN AUTO: 32.7 PG (ref 27–31)
MCHC RBC AUTO-ENTMCNC: 29.3 % (ref 32–36)
MCV RBC AUTO: 111.5 FL (ref 78–100)
MONOCYTES ABSOLUTE: 0.6 K/CU MM
MONOCYTES RELATIVE PERCENT: 12 % (ref 0–4)
PCT TRANSFERRIN: 59 % (ref 10–44)
PDW BLD-RTO: 18.1 % (ref 11.7–14.9)
PLATELET # BLD: 306 K/CU MM (ref 140–440)
PMV BLD AUTO: 9.2 FL (ref 7.5–11.1)
POTASSIUM SERPL-SCNC: 6.6 MMOL/L (ref 3.5–5.1)
RBC # BLD: 2.6 M/CU MM (ref 4.6–6.2)
SEGMENTED NEUTROPHILS ABSOLUTE COUNT: 3 K/CU MM
SEGMENTED NEUTROPHILS RELATIVE PERCENT: 58.9 % (ref 36–66)
SODIUM BLD-SCNC: 142 MMOL/L (ref 135–145)
TOTAL IRON BINDING CAPACITY: 175 UG/DL (ref 250–450)
TOTAL PROTEIN: 8.9 GM/DL (ref 6.4–8.2)
TOTAL PROTEIN: 8.9 GM/DL (ref 6.4–8.2)
UNSATURATED IRON BINDING CAPACITY: 72 UG/DL (ref 110–370)
URATE SERPL-MCNC: 8.5 MG/DL (ref 3.5–7.2)
WBC # BLD: 5 K/CU MM (ref 4–10.5)

## 2023-08-25 PROCEDURE — 82728 ASSAY OF FERRITIN: CPT

## 2023-08-25 PROCEDURE — 96375 TX/PRO/DX INJ NEW DRUG ADDON: CPT

## 2023-08-25 PROCEDURE — G0378 HOSPITAL OBSERVATION PER HR: HCPCS

## 2023-08-25 PROCEDURE — 6370000000 HC RX 637 (ALT 250 FOR IP): Performed by: STUDENT IN AN ORGANIZED HEALTH CARE EDUCATION/TRAINING PROGRAM

## 2023-08-25 PROCEDURE — 4004F PT TOBACCO SCREEN RCVD TLK: CPT | Performed by: INTERNAL MEDICINE

## 2023-08-25 PROCEDURE — 81003 URINALYSIS AUTO W/O SCOPE: CPT

## 2023-08-25 PROCEDURE — 3017F COLORECTAL CA SCREEN DOC REV: CPT | Performed by: INTERNAL MEDICINE

## 2023-08-25 PROCEDURE — 6360000002 HC RX W HCPCS: Performed by: STUDENT IN AN ORGANIZED HEALTH CARE EDUCATION/TRAINING PROGRAM

## 2023-08-25 PROCEDURE — 83883 ASSAY NEPHELOMETRY NOT SPEC: CPT

## 2023-08-25 PROCEDURE — 84165 PROTEIN E-PHORESIS SERUM: CPT

## 2023-08-25 PROCEDURE — 83550 IRON BINDING TEST: CPT

## 2023-08-25 PROCEDURE — 82232 ASSAY OF BETA-2 PROTEIN: CPT

## 2023-08-25 PROCEDURE — 85652 RBC SED RATE AUTOMATED: CPT

## 2023-08-25 PROCEDURE — 6360000002 HC RX W HCPCS: Performed by: INTERNAL MEDICINE

## 2023-08-25 PROCEDURE — 96361 HYDRATE IV INFUSION ADD-ON: CPT

## 2023-08-25 PROCEDURE — 99211 OFF/OP EST MAY X REQ PHY/QHP: CPT

## 2023-08-25 PROCEDURE — 3078F DIAST BP <80 MM HG: CPT | Performed by: INTERNAL MEDICINE

## 2023-08-25 PROCEDURE — 99214 OFFICE O/P EST MOD 30 MIN: CPT | Performed by: INTERNAL MEDICINE

## 2023-08-25 PROCEDURE — 93005 ELECTROCARDIOGRAM TRACING: CPT | Performed by: STUDENT IN AN ORGANIZED HEALTH CARE EDUCATION/TRAINING PROGRAM

## 2023-08-25 PROCEDURE — 3074F SYST BP LT 130 MM HG: CPT | Performed by: INTERNAL MEDICINE

## 2023-08-25 PROCEDURE — 99285 EMERGENCY DEPT VISIT HI MDM: CPT

## 2023-08-25 PROCEDURE — 84155 ASSAY OF PROTEIN SERUM: CPT

## 2023-08-25 PROCEDURE — G8427 DOCREV CUR MEDS BY ELIG CLIN: HCPCS | Performed by: INTERNAL MEDICINE

## 2023-08-25 PROCEDURE — 84550 ASSAY OF BLOOD/URIC ACID: CPT

## 2023-08-25 PROCEDURE — G8418 CALC BMI BLW LOW PARAM F/U: HCPCS | Performed by: INTERNAL MEDICINE

## 2023-08-25 PROCEDURE — 83615 LACTATE (LD) (LDH) ENZYME: CPT

## 2023-08-25 PROCEDURE — 96374 THER/PROPH/DIAG INJ IV PUSH: CPT

## 2023-08-25 PROCEDURE — 83540 ASSAY OF IRON: CPT

## 2023-08-25 PROCEDURE — 1123F ACP DISCUSS/DSCN MKR DOCD: CPT | Performed by: INTERNAL MEDICINE

## 2023-08-25 PROCEDURE — 36415 COLL VENOUS BLD VENIPUNCTURE: CPT

## 2023-08-25 PROCEDURE — 1111F DSCHRG MED/CURRENT MED MERGE: CPT | Performed by: INTERNAL MEDICINE

## 2023-08-25 PROCEDURE — 2580000003 HC RX 258: Performed by: STUDENT IN AN ORGANIZED HEALTH CARE EDUCATION/TRAINING PROGRAM

## 2023-08-25 PROCEDURE — 82962 GLUCOSE BLOOD TEST: CPT

## 2023-08-25 PROCEDURE — 80053 COMPREHEN METABOLIC PANEL: CPT

## 2023-08-25 PROCEDURE — 82784 ASSAY IGA/IGD/IGG/IGM EACH: CPT

## 2023-08-25 PROCEDURE — 86320 SERUM IMMUNOELECTROPHORESIS: CPT

## 2023-08-25 PROCEDURE — 85025 COMPLETE CBC W/AUTO DIFF WBC: CPT

## 2023-08-25 RX ORDER — SODIUM CHLORIDE 9 MG/ML
INJECTION, SOLUTION INTRAVENOUS PRN
Status: DISCONTINUED | OUTPATIENT
Start: 2023-08-25 | End: 2023-08-27 | Stop reason: HOSPADM

## 2023-08-25 RX ORDER — HEPARIN SODIUM 5000 [USP'U]/ML
5000 INJECTION, SOLUTION INTRAVENOUS; SUBCUTANEOUS EVERY 8 HOURS SCHEDULED
Status: DISCONTINUED | OUTPATIENT
Start: 2023-08-26 | End: 2023-08-27 | Stop reason: HOSPADM

## 2023-08-25 RX ORDER — DEXTROSE MONOHYDRATE 100 MG/ML
INJECTION, SOLUTION INTRAVENOUS CONTINUOUS PRN
Status: DISCONTINUED | OUTPATIENT
Start: 2023-08-25 | End: 2023-08-25

## 2023-08-25 RX ORDER — FOLIC ACID 1 MG/1
1 TABLET ORAL DAILY
Status: CANCELLED | OUTPATIENT
Start: 2023-08-26

## 2023-08-25 RX ORDER — PSYLLIUM HUSK 3.4 G/7G
1 POWDER ORAL DAILY
Status: ON HOLD | COMMUNITY
Start: 2023-08-10 | End: 2023-08-27 | Stop reason: HOSPADM

## 2023-08-25 RX ORDER — POLYETHYLENE GLYCOL 3350 17 G/17G
17 POWDER, FOR SOLUTION ORAL DAILY PRN
Status: DISCONTINUED | OUTPATIENT
Start: 2023-08-25 | End: 2023-08-27 | Stop reason: HOSPADM

## 2023-08-25 RX ORDER — 0.9 % SODIUM CHLORIDE 0.9 %
1000 INTRAVENOUS SOLUTION INTRAVENOUS ONCE
Status: COMPLETED | OUTPATIENT
Start: 2023-08-26 | End: 2023-08-26

## 2023-08-25 RX ORDER — INSULIN LISPRO 100 [IU]/ML
0-4 INJECTION, SOLUTION INTRAVENOUS; SUBCUTANEOUS NIGHTLY
Status: DISCONTINUED | OUTPATIENT
Start: 2023-08-26 | End: 2023-08-27 | Stop reason: HOSPADM

## 2023-08-25 RX ORDER — ACETAMINOPHEN 325 MG/1
650 TABLET ORAL EVERY 6 HOURS PRN
Status: DISCONTINUED | OUTPATIENT
Start: 2023-08-25 | End: 2023-08-27 | Stop reason: HOSPADM

## 2023-08-25 RX ORDER — ONDANSETRON 4 MG/1
4 TABLET, ORALLY DISINTEGRATING ORAL EVERY 8 HOURS PRN
Status: DISCONTINUED | OUTPATIENT
Start: 2023-08-25 | End: 2023-08-27 | Stop reason: HOSPADM

## 2023-08-25 RX ORDER — ONDANSETRON 2 MG/ML
4 INJECTION INTRAMUSCULAR; INTRAVENOUS EVERY 6 HOURS PRN
Status: DISCONTINUED | OUTPATIENT
Start: 2023-08-25 | End: 2023-08-27 | Stop reason: HOSPADM

## 2023-08-25 RX ORDER — DOXYCYCLINE HYCLATE 50 MG/1
324 CAPSULE, GELATIN COATED ORAL
Status: CANCELLED | OUTPATIENT
Start: 2023-08-26

## 2023-08-25 RX ORDER — 0.9 % SODIUM CHLORIDE 0.9 %
1000 INTRAVENOUS SOLUTION INTRAVENOUS ONCE
Status: COMPLETED | OUTPATIENT
Start: 2023-08-25 | End: 2023-08-25

## 2023-08-25 RX ORDER — AMLODIPINE BESYLATE 5 MG/1
10 TABLET ORAL DAILY
Status: DISCONTINUED | OUTPATIENT
Start: 2023-08-26 | End: 2023-08-27 | Stop reason: HOSPADM

## 2023-08-25 RX ORDER — ASPIRIN 81 MG/1
81 TABLET, CHEWABLE ORAL DAILY
Status: DISCONTINUED | OUTPATIENT
Start: 2023-08-26 | End: 2023-08-27 | Stop reason: HOSPADM

## 2023-08-25 RX ORDER — SODIUM CHLORIDE 0.9 % (FLUSH) 0.9 %
5-40 SYRINGE (ML) INJECTION PRN
Status: DISCONTINUED | OUTPATIENT
Start: 2023-08-25 | End: 2023-08-27 | Stop reason: HOSPADM

## 2023-08-25 RX ORDER — CALCIUM GLUCONATE 94 MG/ML
1000 INJECTION, SOLUTION INTRAVENOUS ONCE
Status: COMPLETED | OUTPATIENT
Start: 2023-08-25 | End: 2023-08-25

## 2023-08-25 RX ORDER — ATORVASTATIN CALCIUM 40 MG/1
40 TABLET, FILM COATED ORAL NIGHTLY
Status: DISCONTINUED | OUTPATIENT
Start: 2023-08-26 | End: 2023-08-27 | Stop reason: HOSPADM

## 2023-08-25 RX ORDER — INSULIN LISPRO 100 [IU]/ML
0-4 INJECTION, SOLUTION INTRAVENOUS; SUBCUTANEOUS
Status: DISCONTINUED | OUTPATIENT
Start: 2023-08-26 | End: 2023-08-27 | Stop reason: HOSPADM

## 2023-08-25 RX ORDER — PANTOPRAZOLE SODIUM 40 MG/1
40 TABLET, DELAYED RELEASE ORAL
Status: DISCONTINUED | OUTPATIENT
Start: 2023-08-26 | End: 2023-08-27 | Stop reason: HOSPADM

## 2023-08-25 RX ORDER — ACETAMINOPHEN 650 MG/1
650 SUPPOSITORY RECTAL EVERY 6 HOURS PRN
Status: DISCONTINUED | OUTPATIENT
Start: 2023-08-25 | End: 2023-08-27 | Stop reason: HOSPADM

## 2023-08-25 RX ORDER — SODIUM CHLORIDE 0.9 % (FLUSH) 0.9 %
5-40 SYRINGE (ML) INJECTION 2 TIMES DAILY
Status: DISCONTINUED | OUTPATIENT
Start: 2023-08-26 | End: 2023-08-27 | Stop reason: HOSPADM

## 2023-08-25 RX ORDER — GLUCAGON 1 MG/ML
1 KIT INJECTION PRN
Status: DISCONTINUED | OUTPATIENT
Start: 2023-08-25 | End: 2023-08-25

## 2023-08-25 RX ORDER — FUROSEMIDE 10 MG/ML
40 INJECTION INTRAMUSCULAR; INTRAVENOUS ONCE
Status: COMPLETED | OUTPATIENT
Start: 2023-08-25 | End: 2023-08-25

## 2023-08-25 RX ORDER — METOPROLOL TARTRATE 50 MG/1
25 TABLET, FILM COATED ORAL 2 TIMES DAILY
Status: DISCONTINUED | OUTPATIENT
Start: 2023-08-26 | End: 2023-08-27 | Stop reason: HOSPADM

## 2023-08-25 RX ORDER — DEXTROSE MONOHYDRATE 100 MG/ML
INJECTION, SOLUTION INTRAVENOUS CONTINUOUS PRN
Status: DISCONTINUED | OUTPATIENT
Start: 2023-08-25 | End: 2023-08-26 | Stop reason: SDUPTHER

## 2023-08-25 RX ADMIN — METOPROLOL TARTRATE 25 MG: 50 TABLET, FILM COATED ORAL at 23:49

## 2023-08-25 RX ADMIN — FUROSEMIDE 40 MG: 10 INJECTION, SOLUTION INTRAMUSCULAR; INTRAVENOUS at 19:29

## 2023-08-25 RX ADMIN — SODIUM ZIRCONIUM CYCLOSILICATE 10 G: 5 POWDER, FOR SUSPENSION ORAL at 18:44

## 2023-08-25 RX ADMIN — SODIUM CHLORIDE 1000 ML: 9 INJECTION, SOLUTION INTRAVENOUS at 18:13

## 2023-08-25 RX ADMIN — DEXTROSE MONOHYDRATE 250 ML: 100 INJECTION, SOLUTION INTRAVENOUS at 18:33

## 2023-08-25 RX ADMIN — INSULIN HUMAN 10 UNITS: 100 INJECTION, SOLUTION PARENTERAL at 18:46

## 2023-08-25 RX ADMIN — CALCIUM GLUCONATE 1000 MG: 98 INJECTION, SOLUTION INTRAVENOUS at 18:36

## 2023-08-25 ASSESSMENT — PATIENT HEALTH QUESTIONNAIRE - PHQ9
SUM OF ALL RESPONSES TO PHQ QUESTIONS 1-9: 0
2. FEELING DOWN, DEPRESSED OR HOPELESS: 0
SUM OF ALL RESPONSES TO PHQ9 QUESTIONS 1 & 2: 0
SUM OF ALL RESPONSES TO PHQ QUESTIONS 1-9: 0
1. LITTLE INTEREST OR PLEASURE IN DOING THINGS: 0
SUM OF ALL RESPONSES TO PHQ QUESTIONS 1-9: 0
SUM OF ALL RESPONSES TO PHQ QUESTIONS 1-9: 0

## 2023-08-25 ASSESSMENT — ENCOUNTER SYMPTOMS
SORE THROAT: 0
COUGH: 0
SHORTNESS OF BREATH: 0
VOMITING: 0
NAUSEA: 0
BACK PAIN: 0
ABDOMINAL PAIN: 0

## 2023-08-25 ASSESSMENT — PAIN SCALES - GENERAL: PAINLEVEL_OUTOF10: 0

## 2023-08-25 NOTE — ED PROVIDER NOTES
[facility]  Emergency Department Encounter    Pt Alexa Coburn  MRN: [id number]  [address] [facility] [place] [facility] 1950  Date of evaluation: 8/25/23  PCP:  No primary care provider on file. CHIEF COMPLAINT       Chief Complaint   Patient presents with    Abnormal Lab     Called by Dr Nani Mccann sent in for hyperkalemia       HISTORY OF PRESENT ILLNESS     Marisol Angulo is a 74 y.o. male who presents with abnormal lab. Patient is a 75-year-old with a past medical history of CAD status post CABG, diabetes, COPD, hypertension hyperlipidemia. Was recently diagnosed with multiple myeloma was being seen as an outpatient at oncology office today, at that appointment lab work was ordered and it was noted that he has a potassium of 6.6. Patient states he has had hyperkalemia in the past.  Reports that he urinates 3 times a day which is baseline states that it is dark yellow but nonbloody, none tea colored. Patient denies any bleeding, no chest pain, palpitations, shortness of breath near syncope or syncope. PAST MEDICAL / SURGICAL / SOCIAL / FAMILY HISTORY      has a past medical history of Anemia, CAD (coronary artery disease), COPD (chronic obstructive pulmonary disease) ([address]), Diabetes mellitus ([address]), GERD (gastroesophageal reflux disease), Hyperlipidemia, Hypertension, Multiple myeloma ([address]), and Renal failure.     has a past surgical history that includes Coronary artery bypass graft; Coronary angioplasty with stent; Cystocopy (N/A, 05/09/2017); Colonoscopy (N/A, 08/09/2023);  Upper gastrointestinal endoscopy (N/A, 08/09/2023); and Kidney stone removal.    Social History     Socioeconomic History    Marital status:      Spouse name: Not on file    Number of children: Not on file    Years of education: Not on file    Highest education level: Not on file   Occupational History    Not on file   Tobacco Use    Smoking status: Every Day     Packs/day: 1.00     Years: 60.00     Pack years: 60.00     Types:

## 2023-08-25 NOTE — CARE COORDINATION
Pt noted for possible readmission. Pt recently admitted 8/7-8/10/23 for generalized weakness. Pt is from home, has PCP, insurance and declined any CM needs last admission. Pt returns today for abnormal lab sent by Dr. Bere Leung office. Pt is to be admitted for further treatment. No CM needs noted on chart review at this time.

## 2023-08-25 NOTE — PROGRESS NOTES
Patient Name:  Michael Gamboa  Patient :  1950  Patient MRN:  [id number]     Primary Oncologist: Sanket Parekh MD  Referring Provider: No primary care provider on file. Date of Service: 2023      Reason for Consult:  Multiple myeloma       Chief Complaint:    Chief Complaint   Patient presents with    New Patient       Encounter Diagnosis   Name Primary? Multiple myeloma not having achieved remission ([address]) Yes        HPI:   2023 he arrived with his stepdaughter and son to the clinic today. Reported that he has been having right lateral rib pain and also complains of back pain. Also reported pain in the lower extremities which relieves with rest.  Reported poor appetite. Loss of weight. Intermittent chest pain increase shortness of breath. Lower extremity edema as well. No abdominal pain. No nausea or vomiting. No  symptoms. Otherwise    7/10/2023 bone marrow biopsy, aspiration IgA lambda restricted plasma cell neoplasm consistent with plasma cell myeloma. Monoclonal plasma cell accounts first more than 60% of the sampled bone marrow cellularity    2023 PET scan with multiple scattered skeletal abnormalities with greatest intensity in the right iliac wing. Moderate increased uptake in the distal esophagus which may represent inflammatory process. New small fracture in the 10th rib. Stable abdominal aortic aneurysm. 2023 he was admitted to [address] [place] [address] HOSPITAL with generalized fatigue, weakness, dizziness, dyspnea on exertion and right-sided chest pain. He was found to have a hemoglobin of 8.1. CT chest without contrast reported postsurgical changes in the right lung few scattered intermediate subsolid solid nodules, discrete lucency in the right ilium and L3. New T11 fracture. Overall interval increase in size of the 4 cm infrarenal abdominal aortic aneurysm. No other acute findings in the chest abdomen pelvis.   He was also found to be in renal failure, creatinine

## 2023-08-25 NOTE — TELEPHONE ENCOUNTER
Called patient @ [phone] and advised to go to ER d/t K+ 6.6 per physician instruction. Patient reluctant at first, but this RN explained that K level is critical and requires immediate medical attention. Patient states he will Ansley Byes a couple of calls\" to have someone take him to ER. Will continue to follow up with patient.

## 2023-08-26 ENCOUNTER — APPOINTMENT (OUTPATIENT)
Dept: ULTRASOUND IMAGING | Age: 73
DRG: 841 | End: 2023-08-26
Payer: OTHER GOVERNMENT

## 2023-08-26 LAB
ANION GAP SERPL CALCULATED.3IONS-SCNC: 12 MMOL/L (ref 4–16)
ANION GAP SERPL CALCULATED.3IONS-SCNC: 14 MMOL/L (ref 4–16)
BASOPHILS ABSOLUTE: 0 K/CU MM
BASOPHILS RELATIVE PERCENT: 0.5 % (ref 0–1)
BILIRUBIN URINE: NEGATIVE MG/DL
BLOOD, URINE: NEGATIVE
BUN SERPL-MCNC: 26 MG/DL (ref 6–23)
BUN SERPL-MCNC: 27 MG/DL (ref 6–23)
CALCIUM SERPL-MCNC: 10.1 MG/DL (ref 8.3–10.6)
CALCIUM SERPL-MCNC: 9.7 MG/DL (ref 8.3–10.6)
CHLORIDE BLD-SCNC: 106 MMOL/L (ref 99–110)
CHLORIDE BLD-SCNC: 106 MMOL/L (ref 99–110)
CHLORIDE URINE RANDOM: 150 MMOL/L (ref 43–210)
CLARITY: CLEAR
CO2: 19 MMOL/L (ref 21–32)
CO2: 22 MMOL/L (ref 21–32)
COLOR: YELLOW
COMMENT UA: NORMAL
CREAT SERPL-MCNC: 1.7 MG/DL (ref 0.9–1.3)
CREAT SERPL-MCNC: 1.9 MG/DL (ref 0.9–1.3)
DIFFERENTIAL TYPE: ABNORMAL
EOSINOPHILS ABSOLUTE: 0.3 K/CU MM
EOSINOPHILS RELATIVE PERCENT: 6.3 % (ref 0–3)
GFR SERPL CREATININE-BSD FRML MDRD: 37 ML/MIN/1.73M2
GFR SERPL CREATININE-BSD FRML MDRD: 42 ML/MIN/1.73M2
GLUCOSE BLD-MCNC: 180 MG/DL (ref 70–99)
GLUCOSE BLD-MCNC: 293 MG/DL (ref 70–99)
GLUCOSE BLD-MCNC: 360 MG/DL (ref 70–99)
GLUCOSE BLD-MCNC: 416 MG/DL (ref 70–99)
GLUCOSE BLD-MCNC: 461 MG/DL (ref 70–99)
GLUCOSE SERPL-MCNC: 178 MG/DL (ref 70–99)
GLUCOSE SERPL-MCNC: 285 MG/DL (ref 70–99)
GLUCOSE, URINE: NEGATIVE MG/DL
HCT VFR BLD CALC: 27.4 % (ref 42–52)
HEMOGLOBIN: 8 GM/DL (ref 13.5–18)
IGA: 3773 MG/DL (ref 69–382)
IGG,SERUM: 598 MG/DL (ref 723–1685)
IGM,SERUM: 86 MG/DL (ref 62–277)
IMMATURE NEUTROPHIL %: 0.2 % (ref 0–0.43)
KETONES, URINE: NEGATIVE MG/DL
LEUKOCYTE ESTERASE, URINE: NEGATIVE
LYMPHOCYTES ABSOLUTE: 1.5 K/CU MM
LYMPHOCYTES RELATIVE PERCENT: 35.3 % (ref 24–44)
MCH RBC QN AUTO: 32.3 PG (ref 27–31)
MCHC RBC AUTO-ENTMCNC: 29.2 % (ref 32–36)
MCV RBC AUTO: 110.5 FL (ref 78–100)
MONOCYTES ABSOLUTE: 0.5 K/CU MM
MONOCYTES RELATIVE PERCENT: 12 % (ref 0–4)
NITRITE URINE, QUANTITATIVE: NEGATIVE
NUCLEATED RBC %: 0 %
PDW BLD-RTO: 17.8 % (ref 11.7–14.9)
PH, URINE: 6 (ref 5–8)
PHOSPHORUS: 5.3 MG/DL (ref 2.5–4.9)
PLATELET # BLD: 251 K/CU MM (ref 140–440)
PMV BLD AUTO: 9.5 FL (ref 7.5–11.1)
POTASSIUM SERPL-SCNC: 5.9 MMOL/L (ref 3.5–5.1)
POTASSIUM SERPL-SCNC: 6.2 MMOL/L (ref 3.5–5.1)
POTASSIUM, UR: 18.3 MMOL/L (ref 22–119)
PROTEIN UA: NEGATIVE MG/DL
RBC # BLD: 2.48 M/CU MM (ref 4.6–6.2)
SEGMENTED NEUTROPHILS ABSOLUTE COUNT: 1.9 K/CU MM
SEGMENTED NEUTROPHILS RELATIVE PERCENT: 45.7 % (ref 36–66)
SODIUM BLD-SCNC: 139 MMOL/L (ref 135–145)
SODIUM BLD-SCNC: 140 MMOL/L (ref 135–145)
SODIUM URINE: 141 MMOL/L (ref 35–167)
SPECIFIC GRAVITY UA: 1.01 (ref 1–1.03)
TOTAL IMMATURE NEUTOROPHIL: 0.01 K/CU MM
TOTAL NUCLEATED RBC: 0 K/CU MM
URATE SERPL-MCNC: 8.1 MG/DL (ref 3.5–7.2)
UROBILINOGEN, URINE: 0.2 MG/DL (ref 0.2–1)
WBC # BLD: 4.2 K/CU MM (ref 4–10.5)

## 2023-08-26 PROCEDURE — 6370000000 HC RX 637 (ALT 250 FOR IP): Performed by: INTERNAL MEDICINE

## 2023-08-26 PROCEDURE — 94761 N-INVAS EAR/PLS OXIMETRY MLT: CPT

## 2023-08-26 PROCEDURE — 82330 ASSAY OF CALCIUM: CPT

## 2023-08-26 PROCEDURE — 2580000003 HC RX 258: Performed by: STUDENT IN AN ORGANIZED HEALTH CARE EDUCATION/TRAINING PROGRAM

## 2023-08-26 PROCEDURE — 83970 ASSAY OF PARATHORMONE: CPT

## 2023-08-26 PROCEDURE — 96366 THER/PROPH/DIAG IV INF ADDON: CPT

## 2023-08-26 PROCEDURE — 96376 TX/PRO/DX INJ SAME DRUG ADON: CPT

## 2023-08-26 PROCEDURE — 6370000000 HC RX 637 (ALT 250 FOR IP): Performed by: STUDENT IN AN ORGANIZED HEALTH CARE EDUCATION/TRAINING PROGRAM

## 2023-08-26 PROCEDURE — 96372 THER/PROPH/DIAG INJ SC/IM: CPT

## 2023-08-26 PROCEDURE — 82962 GLUCOSE BLOOD TEST: CPT

## 2023-08-26 PROCEDURE — 84133 ASSAY OF URINE POTASSIUM: CPT

## 2023-08-26 PROCEDURE — 2500000003 HC RX 250 WO HCPCS: Performed by: INTERNAL MEDICINE

## 2023-08-26 PROCEDURE — 84300 ASSAY OF URINE SODIUM: CPT

## 2023-08-26 PROCEDURE — 6360000002 HC RX W HCPCS: Performed by: STUDENT IN AN ORGANIZED HEALTH CARE EDUCATION/TRAINING PROGRAM

## 2023-08-26 PROCEDURE — 51798 US URINE CAPACITY MEASURE: CPT

## 2023-08-26 PROCEDURE — G0378 HOSPITAL OBSERVATION PER HR: HCPCS

## 2023-08-26 PROCEDURE — 81003 URINALYSIS AUTO W/O SCOPE: CPT

## 2023-08-26 PROCEDURE — 76775 US EXAM ABDO BACK WALL LIM: CPT

## 2023-08-26 PROCEDURE — 82436 ASSAY OF URINE CHLORIDE: CPT

## 2023-08-26 PROCEDURE — 85025 COMPLETE CBC W/AUTO DIFF WBC: CPT

## 2023-08-26 PROCEDURE — 36415 COLL VENOUS BLD VENIPUNCTURE: CPT

## 2023-08-26 PROCEDURE — 82310 ASSAY OF CALCIUM: CPT

## 2023-08-26 PROCEDURE — 80048 BASIC METABOLIC PNL TOTAL CA: CPT

## 2023-08-26 PROCEDURE — 82542 COL CHROMOTOGRAPHY QUAL/QUAN: CPT

## 2023-08-26 PROCEDURE — 2580000003 HC RX 258: Performed by: INTERNAL MEDICINE

## 2023-08-26 PROCEDURE — 6360000002 HC RX W HCPCS: Performed by: INTERNAL MEDICINE

## 2023-08-26 PROCEDURE — 84100 ASSAY OF PHOSPHORUS: CPT

## 2023-08-26 PROCEDURE — 96365 THER/PROPH/DIAG IV INF INIT: CPT

## 2023-08-26 PROCEDURE — 96361 HYDRATE IV INFUSION ADD-ON: CPT

## 2023-08-26 PROCEDURE — 84550 ASSAY OF BLOOD/URIC ACID: CPT

## 2023-08-26 PROCEDURE — 1200000000 HC SEMI PRIVATE

## 2023-08-26 RX ORDER — FUROSEMIDE 10 MG/ML
40 INJECTION INTRAMUSCULAR; INTRAVENOUS ONCE
Status: COMPLETED | OUTPATIENT
Start: 2023-08-26 | End: 2023-08-26

## 2023-08-26 RX ORDER — SODIUM CHLORIDE 9 MG/ML
INJECTION, SOLUTION INTRAVENOUS CONTINUOUS
Status: DISCONTINUED | OUTPATIENT
Start: 2023-08-26 | End: 2023-08-26

## 2023-08-26 RX ORDER — GLUCAGON 1 MG/ML
1 KIT INJECTION PRN
Status: DISCONTINUED | OUTPATIENT
Start: 2023-08-26 | End: 2023-08-27 | Stop reason: HOSPADM

## 2023-08-26 RX ORDER — 0.9 % SODIUM CHLORIDE 0.9 %
500 INTRAVENOUS SOLUTION INTRAVENOUS ONCE
Status: COMPLETED | OUTPATIENT
Start: 2023-08-26 | End: 2023-08-26

## 2023-08-26 RX ORDER — DEXTROSE MONOHYDRATE 100 MG/ML
INJECTION, SOLUTION INTRAVENOUS CONTINUOUS PRN
Status: DISCONTINUED | OUTPATIENT
Start: 2023-08-26 | End: 2023-08-27 | Stop reason: HOSPADM

## 2023-08-26 RX ADMIN — INSULIN LISPRO 4 UNITS: 100 INJECTION, SOLUTION INTRAVENOUS; SUBCUTANEOUS at 21:02

## 2023-08-26 RX ADMIN — SODIUM ZIRCONIUM CYCLOSILICATE 10 G: 10 POWDER, FOR SUSPENSION ORAL at 10:14

## 2023-08-26 RX ADMIN — SODIUM BICARBONATE: 84 INJECTION, SOLUTION INTRAVENOUS at 20:03

## 2023-08-26 RX ADMIN — SODIUM BICARBONATE: 84 INJECTION, SOLUTION INTRAVENOUS at 12:25

## 2023-08-26 RX ADMIN — SODIUM CHLORIDE 500 ML: 9 INJECTION, SOLUTION INTRAVENOUS at 07:04

## 2023-08-26 RX ADMIN — SODIUM CHLORIDE 1000 ML: 9 INJECTION, SOLUTION INTRAVENOUS at 00:14

## 2023-08-26 RX ADMIN — SODIUM ZIRCONIUM CYCLOSILICATE 10 G: 10 POWDER, FOR SUSPENSION ORAL at 21:03

## 2023-08-26 RX ADMIN — METOPROLOL TARTRATE 25 MG: 50 TABLET, FILM COATED ORAL at 21:04

## 2023-08-26 RX ADMIN — SODIUM CHLORIDE, PRESERVATIVE FREE 10 ML: 5 INJECTION INTRAVENOUS at 10:14

## 2023-08-26 RX ADMIN — SODIUM ZIRCONIUM CYCLOSILICATE 10 G: 10 POWDER, FOR SUSPENSION ORAL at 14:25

## 2023-08-26 RX ADMIN — AMLODIPINE BESYLATE 10 MG: 5 TABLET ORAL at 10:13

## 2023-08-26 RX ADMIN — SODIUM CHLORIDE, PRESERVATIVE FREE 10 ML: 5 INJECTION INTRAVENOUS at 00:20

## 2023-08-26 RX ADMIN — HEPARIN SODIUM 5000 UNITS: 5000 INJECTION INTRAVENOUS; SUBCUTANEOUS at 14:25

## 2023-08-26 RX ADMIN — INSULIN LISPRO 2 UNITS: 100 INJECTION, SOLUTION INTRAVENOUS; SUBCUTANEOUS at 11:42

## 2023-08-26 RX ADMIN — FUROSEMIDE 40 MG: 10 INJECTION, SOLUTION INTRAMUSCULAR; INTRAVENOUS at 10:19

## 2023-08-26 RX ADMIN — METOPROLOL TARTRATE 25 MG: 50 TABLET, FILM COATED ORAL at 10:13

## 2023-08-26 RX ADMIN — SODIUM BICARBONATE: 84 INJECTION, SOLUTION INTRAVENOUS at 14:31

## 2023-08-26 RX ADMIN — HEPARIN SODIUM 5000 UNITS: 5000 INJECTION INTRAVENOUS; SUBCUTANEOUS at 06:55

## 2023-08-26 RX ADMIN — ASPIRIN 81 MG: 81 TABLET, CHEWABLE ORAL at 10:14

## 2023-08-26 RX ADMIN — DEXTROSE MONOHYDRATE 250 ML: 100 INJECTION, SOLUTION INTRAVENOUS at 14:31

## 2023-08-26 RX ADMIN — INSULIN LISPRO 4 UNITS: 100 INJECTION, SOLUTION INTRAVENOUS; SUBCUTANEOUS at 18:56

## 2023-08-26 RX ADMIN — HEPARIN SODIUM 5000 UNITS: 5000 INJECTION INTRAVENOUS; SUBCUTANEOUS at 21:03

## 2023-08-26 RX ADMIN — ATORVASTATIN CALCIUM 40 MG: 40 TABLET, FILM COATED ORAL at 21:04

## 2023-08-26 RX ADMIN — SODIUM CHLORIDE, PRESERVATIVE FREE 10 ML: 5 INJECTION INTRAVENOUS at 21:02

## 2023-08-26 RX ADMIN — PANTOPRAZOLE SODIUM 40 MG: 40 TABLET, DELAYED RELEASE ORAL at 06:55

## 2023-08-26 RX ADMIN — FUROSEMIDE 40 MG: 10 INJECTION, SOLUTION INTRAMUSCULAR; INTRAVENOUS at 14:23

## 2023-08-26 RX ADMIN — INSULIN HUMAN 10 UNITS: 100 INJECTION, SOLUTION PARENTERAL at 14:35

## 2023-08-26 ASSESSMENT — PAIN SCALES - WONG BAKER: WONGBAKER_NUMERICALRESPONSE: 0

## 2023-08-26 ASSESSMENT — PAIN SCALES - GENERAL: PAINLEVEL_OUTOF10: 0

## 2023-08-26 NOTE — PLAN OF CARE
Problem: Discharge Planning  Goal: Discharge to home or other facility with appropriate resources  Outcome: Progressing  Flowsheets (Taken 8/25/2023 2300)  Discharge to home or other facility with appropriate resources:   Arrange for needed discharge resources and transportation as appropriate   Identify discharge learning needs (meds, wound care, etc)   Arrange for interpreters to assist at discharge as needed   Refer to discharge planning if patient needs post-hospital services based on physician order or complex needs related to functional status, cognitive ability or social support system

## 2023-08-26 NOTE — H&P
History and Physical      Name:  Samira Gonzalez /Age/Sex:   (74 y.o. male)   MRN & CSN:  [id number] & [id number] Encounter Date/Time: 2023 8:16 PM EDT   [facility]  [facility] PCP: No primary care provider on file. Assessment and Plan:   Samira Gonzalez is a 74 y.o. male with past medical history of chronic anemia, CKD stage III, multiple myeloma, T11 compression fracture, hypertension, T2DM who presents with Hyperkalemia    Hospital Problems             Last Modified POA    * (Principal) Hyperkalemia 2023 Yes     Hyperkalemia  6.6 on admission  Likely secondary to renal disease and untreated MM  EKG personally reviewed, normal sinus rhythm, 67/min, artifact, no acute ST-T wave abnormalities  Medically treated with calcium gluconate, Lasix, IV insulin/D10 and Lokelma 10 g  Observation with telemetry  Recheck BMP every 12 hours  Repeat EKG    Hypercalcemia  10.9 on admission  Could be secondary to underlying MM  IV NS bolus  Check PTH and PTH RP    CKD stage 3  Baseline creatinine around 1.6-1.8  Mildly elevated Cr from baseline. Again could be a manifestation of MM, also concern for dehydration due to poor p.o. intake. Bladder scan and urinalysis  IV hydration  Check metabolic panel in a.m. Chronic macrocytic anemia  Baseline hemoglobin 8-9  Likely manifestation of untreated MM  No evidence of active bleeding noted  Recent EGD and colonoscopy, only some polyps were identified and patient was referred to [facility] for EMR. Continue to monitor CBC. Newly diagnosed multiple myeloma  Untreated so far, oncologist waiting on records from [facility] where it was diagnosed.   Follow-up outpatient with oncology    Hypertension  Continue amlodipine    T2DM  Sliding scale insulin  Hypoglycemia protocol    Observation, stepdown with telemetry  Full code    Disposition:   Current Living situation: Home  Expected Disposition: Home Home  Estimated D/C: 2 days    Diet ADULT DIET;

## 2023-08-26 NOTE — FLOWSHEET NOTE
.4 Eyes Skin Assessment     NAME:  Hafsa Adkins  YOB: 1950  MEDICAL RECORD NUMBER:  [id number]    The patient is being assessed for  Admission    I agree that at least one RN has performed a thorough Head to Toe Skin Assessment on the patient. ALL assessment sites listed below have been assessed. Areas assessed by both nurses:    Head, Face, Ears, Shoulders, Back, Chest, Arms, Elbows, Hands, Sacrum. Buttock, Coccyx, Ischium, Legs. Feet and Heels, and Under Medical Devices         Does the Patient have a Wound?  No noted wound(s)       Aristides Prevention initiated by RN: No  Wound Care Orders initiated by RN: No    Pressure Injury (Stage 3,4, Unstageable, DTI, NWPT, and Complex wounds) if present, place Wound referral order by RN under : No    New Ostomies, if present place, Ostomy referral order under : No     Nurse 1 eSignature: Electronically signed by April Fitzgerald RN on 8/26/23 at 1:46 AM EDT    **SHARE this note so that the co-signing nurse can place an eSignature**    Nurse 2 eSignature: Electronically signed by Priscilla Dillon RN on 8/26/23 at 1:48 AM EDT

## 2023-08-26 NOTE — ED NOTES
Patient had uncontrollable diarrhea when attempting to urinate.      Pati Moraes RN  08/25/23 [id number]
Rhythm: See EKG  Last documented pain medication administered: None  NIH Score: NIH     Active LDA's:   Peripheral IV 08/25/23 Left Antecubital (Active)       Pertinent or High Risk Medications/Drips: no   If Yes, please provide details:   Blood Product Administration: no  If Yes, please provide details:     Recommendation    Incomplete orders Has one more POC glucose on the hour at 2200  Additional Comments:    If any further questions, please call Sending RN at 0701    Electronically signed by: Electronically signed by David Coon RN on 8/25/2023 at 10:08 PM      David Coon RN  08/25/23 2200 [facility]  08/25/23 [id number]

## 2023-08-27 VITALS
WEIGHT: 120 LBS | HEIGHT: 69 IN | SYSTOLIC BLOOD PRESSURE: 109 MMHG | OXYGEN SATURATION: 95 % | RESPIRATION RATE: 19 BRPM | BODY MASS INDEX: 17.77 KG/M2 | DIASTOLIC BLOOD PRESSURE: 64 MMHG | HEART RATE: 83 BPM | TEMPERATURE: 98.7 F

## 2023-08-27 LAB
ANION GAP SERPL CALCULATED.3IONS-SCNC: 17 MMOL/L (ref 4–16)
BUN SERPL-MCNC: 25 MG/DL (ref 6–23)
CALCIUM SERPL-MCNC: 9.6 MG/DL (ref 8.3–10.6)
CHLORIDE BLD-SCNC: 99 MMOL/L (ref 99–110)
CO2: 25 MMOL/L (ref 21–32)
CREAT SERPL-MCNC: 1.9 MG/DL (ref 0.9–1.3)
GFR SERPL CREATININE-BSD FRML MDRD: 37 ML/MIN/1.73M2
GLUCOSE BLD-MCNC: 129 MG/DL (ref 70–99)
GLUCOSE BLD-MCNC: 158 MG/DL (ref 70–99)
GLUCOSE BLD-MCNC: 243 MG/DL (ref 70–99)
GLUCOSE SERPL-MCNC: 72 MG/DL (ref 70–99)
POTASSIUM SERPL-SCNC: 4.5 MMOL/L (ref 3.5–5.1)
SODIUM BLD-SCNC: 141 MMOL/L (ref 135–145)

## 2023-08-27 PROCEDURE — 6360000002 HC RX W HCPCS: Performed by: STUDENT IN AN ORGANIZED HEALTH CARE EDUCATION/TRAINING PROGRAM

## 2023-08-27 PROCEDURE — 99223 1ST HOSP IP/OBS HIGH 75: CPT | Performed by: INTERNAL MEDICINE

## 2023-08-27 PROCEDURE — 6370000000 HC RX 637 (ALT 250 FOR IP): Performed by: STUDENT IN AN ORGANIZED HEALTH CARE EDUCATION/TRAINING PROGRAM

## 2023-08-27 PROCEDURE — 6370000000 HC RX 637 (ALT 250 FOR IP): Performed by: INTERNAL MEDICINE

## 2023-08-27 PROCEDURE — 36415 COLL VENOUS BLD VENIPUNCTURE: CPT

## 2023-08-27 PROCEDURE — 94761 N-INVAS EAR/PLS OXIMETRY MLT: CPT

## 2023-08-27 PROCEDURE — 80048 BASIC METABOLIC PNL TOTAL CA: CPT

## 2023-08-27 PROCEDURE — 2580000003 HC RX 258: Performed by: STUDENT IN AN ORGANIZED HEALTH CARE EDUCATION/TRAINING PROGRAM

## 2023-08-27 PROCEDURE — 82962 GLUCOSE BLOOD TEST: CPT

## 2023-08-27 RX ORDER — INSULIN GLARGINE 100 [IU]/ML
8 INJECTION, SOLUTION SUBCUTANEOUS EVERY MORNING
Qty: 10 ML | Refills: 0 | Status: ON HOLD | OUTPATIENT
Start: 2023-08-27

## 2023-08-27 RX ADMIN — METOPROLOL TARTRATE 25 MG: 50 TABLET, FILM COATED ORAL at 09:56

## 2023-08-27 RX ADMIN — ASPIRIN 81 MG: 81 TABLET, CHEWABLE ORAL at 09:56

## 2023-08-27 RX ADMIN — SODIUM CHLORIDE, PRESERVATIVE FREE 10 ML: 5 INJECTION INTRAVENOUS at 09:58

## 2023-08-27 RX ADMIN — INSULIN LISPRO 1 UNITS: 100 INJECTION, SOLUTION INTRAVENOUS; SUBCUTANEOUS at 11:46

## 2023-08-27 RX ADMIN — AMLODIPINE BESYLATE 10 MG: 5 TABLET ORAL at 09:56

## 2023-08-27 RX ADMIN — SODIUM ZIRCONIUM CYCLOSILICATE 10 G: 10 POWDER, FOR SUSPENSION ORAL at 09:56

## 2023-08-27 RX ADMIN — PANTOPRAZOLE SODIUM 40 MG: 40 TABLET, DELAYED RELEASE ORAL at 06:37

## 2023-08-27 RX ADMIN — HEPARIN SODIUM 5000 UNITS: 5000 INJECTION INTRAVENOUS; SUBCUTANEOUS at 06:37

## 2023-08-27 ASSESSMENT — PAIN SCALES - WONG BAKER
WONGBAKER_NUMERICALRESPONSE: 0

## 2023-08-27 NOTE — DISCHARGE SUMMARY
V2.0  Discharge Summary    Name:  Sarah Martinez /Age/Sex:  (14 y.o. male)   Admit Date: 2023  Discharge Date: 23    MRN & CSN:  [id number] & [id number] Encounter Date and Time 23 1:06 PM EDT    Attending:  Roxanne Mcintyre MD Discharging Provider: ADEEL Key - CNP       Hospital Course:     Brief HPI: Sarah Martinez is a 74 y.o. male who presented with     Brief Problem Based Course:   Hyperkalemia, resolved  6.6 on admission-->5.9-->6.2-->4.5  Likely secondary to renal disease and untreated MM  EKG personally reviewed, normal sinus rhythm, 67/min, artifact, no acute ST-T wave abnormalities  Medically treated with calcium gluconate, Lasix, IV insulin/D10 and Lokelma 10 g  Continue Lokelma and Bicarb drip  D/C home on Lokelma 10 mg daily. Outpatient pharmacy closed on weekend  Can call on Monday if unable to pay for [facility]. Other options are Veltassa 8.4 g daily or Kayexalate 15 g daily      Hypercalcemia, resolved  10.9 on admission--9.8 today  Could be secondary to underlying MM  IV NS bolus  Check PTH and PTH RP       CKD stage 3  Baseline creatinine around 1.6-1.8  Renal US consistent with chronic renal disease. 5 mm nonobstructing kidney stone or hydronephrosis. UA negative. Mildly elevated Cr from baseline. Again could be a manifestation of MM, also concern for dehydration due to poor p.o. intake. Bladder scan  IV hydration  F/U BMP as outpatient     Chronic macrocytic anemia   could be multifactorial from MM, chronic disease, AND dalton  Baseline hemoglobin 8-9. Iron panel: Ferritin 161, Iron 103, TIBC: 175, and Transferrin: 59. Improved from pevious lab  No evidence of active bleeding noted  Recent EGD and colonoscopy, only some polyps were identified and patient was referred to [facility] for EMR. Received 300 mg X3 days on last admission.  No need for iron infusion at this time     Newly diagnosed multiple myeloma  Untreated so far, oncologist waiting on records from [place]

## 2023-08-28 ENCOUNTER — CLINICAL DOCUMENTATION (OUTPATIENT)
Dept: ONCOLOGY | Age: 73
End: 2023-08-28

## 2023-08-28 DIAGNOSIS — C90.00 MULTIPLE MYELOMA NOT HAVING ACHIEVED REMISSION (HCC): Primary | ICD-10-CM

## 2023-08-28 DIAGNOSIS — Z11.59 NEED FOR HEPATITIS B SCREENING TEST: ICD-10-CM

## 2023-08-28 LAB
ALBUMIN SERPL ELPH-MCNC: 3.2 GM/DL (ref 3.2–5.6)
ALPHA-1-GLOBULIN: 0.4 GM/DL (ref 0.1–0.4)
ALPHA-2-GLOBULIN: 0.6 GM/DL (ref 0.4–1.2)
B2 MICROGLOB SERPL-MCNC: 16.9 MG/L
BETA GLOBULIN: 3.5 GM/DL (ref 0.5–1.3)
EKG ATRIAL RATE: 67 BPM
EKG DIAGNOSIS: NORMAL
EKG P AXIS: 83 DEGREES
EKG P-R INTERVAL: 180 MS
EKG Q-T INTERVAL: 398 MS
EKG QRS DURATION: 102 MS
EKG QTC CALCULATION (BAZETT): 420 MS
EKG R AXIS: 72 DEGREES
EKG T AXIS: 235 DEGREES
EKG VENTRICULAR RATE: 67 BPM
GAMMA GLOBULIN: 1.2 GM/DL (ref 0.5–1.6)
KAPPA LC FREE SER-MCNC: 25.21 MG/L (ref 3.3–19.4)
KAPPA LC FREE/LAMBDA FREE SER NEPH: 0.01 {RATIO} (ref 0.26–1.65)
LAMBDA LC FREE SERPL-MCNC: 2025.59 MG/L (ref 5.71–26.3)
SPEP INTERPRETATION: ABNORMAL
SPEP INTERPRETATION: NORMAL
TOTAL PROTEIN: 8.9 GM/DL (ref 6.4–8.2)

## 2023-08-28 PROCEDURE — 93010 ELECTROCARDIOGRAM REPORT: CPT | Performed by: INTERNAL MEDICINE

## 2023-08-28 RX ORDER — ALLOPURINOL 300 MG/1
300 TABLET ORAL DAILY
Qty: 30 TABLET | Refills: 2 | Status: ON HOLD | OUTPATIENT
Start: 2023-08-28

## 2023-08-28 RX ORDER — LENALIDOMIDE 10 MG/1
10 CAPSULE ORAL DAILY
Qty: 21 CAPSULE | Refills: 0 | Status: SHIPPED | OUTPATIENT
Start: 2023-08-28 | End: 2023-08-29

## 2023-08-28 NOTE — PROGRESS NOTES
NN referral placed since tx regimen changing to Daratumumab + Lenalidomide + Dexamethasone. Patient-Physician Agreement Form (PPAF) completed through [employer] portal. Lenalidomide (Revlimid) 10 mg chemo capsules ordered and e-scribed to [facility] per physician order. Prescriber survey completed and auth # [id number] obtained through [employer] portal. Auth valid for 30 days and attached to RX. Patient to tale oral chemo as follows:     Lenalidomide (Revlimid) 10 mg - Take 1 table by mouth daily for 21 days on then 7 days off Q28D. Explained that once medication approved, patient will be scheduled for education/treatment and contacted with appointment times. Patient instructed NOT to take oral chemo until education provided. Voices understanding. RX for allopurinol 300 mg daily e-scribed to local pharmacy at [facility] in [place] per physician order. Encouraged patient to drink plenty of fluids. Will need to check Tumor lysis labs once tx started. No further needs addressed at this time.

## 2023-08-29 DIAGNOSIS — C90.00 MULTIPLE MYELOMA NOT HAVING ACHIEVED REMISSION (HCC): ICD-10-CM

## 2023-08-29 RX ORDER — LENALIDOMIDE 10 MG/1
10 CAPSULE ORAL DAILY
Qty: 21 CAPSULE | Refills: 0 | Status: ON HOLD | OUTPATIENT
Start: 2023-08-29

## 2023-08-30 LAB
CALCIUM IONIZED: 5.2 MG/DL (ref 4.48–5.28)
CALCIUM SERPL-MCNC: 9.8 MG/DL (ref 8.3–10.6)
IONIZED CA: 1.3 MMOL/L (ref 1.12–1.32)
PTH-INTACT SERPL-MCNC: 10 PG/ML (ref 15–65)

## 2023-08-31 NOTE — PROGRESS NOTES
Physician Progress Note      PATIENT:               Chandra Oseguera  CSN #:                  [id number]  :                       1950  ADMIT DATE:       2023 8:59 AM  DISCH DATE:        8/10/2023 4:19 PM  RESPONDING  PROVIDER #:        Olin Labs MD          QUERY TEXT:    Patient admitted with weakness, anemia. Per dietitian consult on , pt meets   AND/ASPEN criteria for severe malnutrition. If possible, please document in   progress notes and discharge summary if you are evaluating and /or treating   any of the following: The medical record reflects the following:  Risk Factors: reduced intake, poor hydration  Clinical Indicators: 5'9\". 135 lbs, BMI 20. Dietitian consult:  Severe   malnutrition, In context of chronic illness related to inadequate   protein-energy intake (loss of appetite) as evidenced by poor intake prior to   admission, moderate loss of subcutaneous fat, moderate muscle loss, Criteria   as identified in malnutrition assessment  Treatment: dietitian consult, weights, I&O's, regular diet    ROBERTO Frazier, RN, [facility]  Clinical   [phone]  Options provided:  -- Protein calorie malnutrition severe  -- Other - I will add my own diagnosis  -- Disagree - Not applicable / Not valid  -- Disagree - Clinically unable to determine / Unknown  -- Refer to Clinical Documentation Reviewer    PROVIDER RESPONSE TEXT:    This patient has severe protein calorie malnutrition.     Query created by: Nhung Mata on 8/15/2023 8:23 AM      Electronically signed by:  Remigio Crockett MD 2023 9:09 AM

## 2023-09-01 LAB — PTH-RELATED PROTEIN PLASMA: 4.2 PMOL/L (ref 0–2.3)

## 2023-09-02 ENCOUNTER — APPOINTMENT (OUTPATIENT)
Dept: CT IMAGING | Age: 73
End: 2023-09-02
Payer: OTHER GOVERNMENT

## 2023-09-02 ENCOUNTER — HOSPITAL ENCOUNTER (INPATIENT)
Age: 73
LOS: 6 days | End: 2023-09-08
Attending: STUDENT IN AN ORGANIZED HEALTH CARE EDUCATION/TRAINING PROGRAM
Payer: OTHER GOVERNMENT

## 2023-09-02 DIAGNOSIS — J96.01 SEPSIS WITH ACUTE HYPOXIC RESPIRATORY FAILURE WITHOUT SEPTIC SHOCK, DUE TO UNSPECIFIED ORGANISM (HCC): Primary | ICD-10-CM

## 2023-09-02 DIAGNOSIS — J96.01 ACUTE RESPIRATORY FAILURE WITH HYPOXIA (HCC): ICD-10-CM

## 2023-09-02 DIAGNOSIS — R65.20 SEPSIS WITH ACUTE HYPOXIC RESPIRATORY FAILURE WITHOUT SEPTIC SHOCK, DUE TO UNSPECIFIED ORGANISM (HCC): Primary | ICD-10-CM

## 2023-09-02 DIAGNOSIS — N18.9 ACUTE KIDNEY INJURY SUPERIMPOSED ON CKD (HCC): ICD-10-CM

## 2023-09-02 DIAGNOSIS — R77.8 ELEVATED TROPONIN: ICD-10-CM

## 2023-09-02 DIAGNOSIS — A41.9 SEPSIS WITH ACUTE HYPOXIC RESPIRATORY FAILURE WITHOUT SEPTIC SHOCK, DUE TO UNSPECIFIED ORGANISM (HCC): Primary | ICD-10-CM

## 2023-09-02 DIAGNOSIS — N17.9 ACUTE KIDNEY INJURY SUPERIMPOSED ON CKD (HCC): ICD-10-CM

## 2023-09-02 DIAGNOSIS — J18.9 PNEUMONIA OF RIGHT LOWER LOBE DUE TO INFECTIOUS ORGANISM: ICD-10-CM

## 2023-09-02 LAB
ALBUMIN SERPL-MCNC: 3.1 GM/DL (ref 3.4–5)
ALP BLD-CCNC: 110 IU/L (ref 40–129)
ALT SERPL-CCNC: 8 U/L (ref 10–40)
ANION GAP SERPL CALCULATED.3IONS-SCNC: 19 MMOL/L (ref 4–16)
AST SERPL-CCNC: 14 IU/L (ref 15–37)
BASOPHILS ABSOLUTE: 0 K/CU MM
BASOPHILS RELATIVE PERCENT: 0.3 % (ref 0–1)
BILIRUB SERPL-MCNC: 0.5 MG/DL (ref 0–1)
BILIRUBIN DIRECT: 0.2 MG/DL (ref 0–0.3)
BILIRUBIN, INDIRECT: 0.3 MG/DL (ref 0–0.7)
BUN SERPL-MCNC: 63 MG/DL (ref 6–23)
CALCIUM SERPL-MCNC: 10.7 MG/DL (ref 8.3–10.6)
CHLORIDE BLD-SCNC: 95 MMOL/L (ref 99–110)
CO2: 21 MMOL/L (ref 21–32)
CREAT SERPL-MCNC: 2.6 MG/DL (ref 0.9–1.3)
DIFFERENTIAL TYPE: ABNORMAL
EOSINOPHILS ABSOLUTE: 0 K/CU MM
EOSINOPHILS RELATIVE PERCENT: 0 % (ref 0–3)
GFR SERPL CREATININE-BSD FRML MDRD: 25 ML/MIN/1.73M2
GLUCOSE BLD-MCNC: 256 MG/DL (ref 70–99)
GLUCOSE SERPL-MCNC: 257 MG/DL (ref 70–99)
HCT VFR BLD CALC: 29.6 % (ref 42–52)
HEMOGLOBIN: 8.8 GM/DL (ref 13.5–18)
IMMATURE NEUTROPHIL %: 0.4 % (ref 0–0.43)
INFLUENZA A ANTIGEN: NOT DETECTED
INFLUENZA B ANTIGEN: NOT DETECTED
LACTIC ACID, SEPSIS: 0.8 MMOL/L (ref 0.5–1.9)
LACTIC ACID, SEPSIS: 2.7 MMOL/L (ref 0.5–1.9)
LIPASE: 17 IU/L (ref 13–60)
LYMPHOCYTES ABSOLUTE: 0.6 K/CU MM
LYMPHOCYTES RELATIVE PERCENT: 8.1 % (ref 24–44)
MAGNESIUM: 1.9 MG/DL (ref 1.8–2.4)
MCH RBC QN AUTO: 32.6 PG (ref 27–31)
MCHC RBC AUTO-ENTMCNC: 29.7 % (ref 32–36)
MCV RBC AUTO: 109.6 FL (ref 78–100)
MONOCYTES ABSOLUTE: 0.6 K/CU MM
MONOCYTES RELATIVE PERCENT: 8.4 % (ref 0–4)
NUCLEATED RBC %: 0 %
PDW BLD-RTO: 17.6 % (ref 11.7–14.9)
PLATELET # BLD: 261 K/CU MM (ref 140–440)
PMV BLD AUTO: 9.9 FL (ref 7.5–11.1)
POTASSIUM SERPL-SCNC: 4.1 MMOL/L (ref 3.5–5.1)
PRO-BNP: 4113 PG/ML
PROCALCITONIN SERPL-MCNC: 2.23 NG/ML
RBC # BLD: 2.7 M/CU MM (ref 4.6–6.2)
SARS-COV-2 RDRP RESP QL NAA+PROBE: NOT DETECTED
SEGMENTED NEUTROPHILS ABSOLUTE COUNT: 5.7 K/CU MM
SEGMENTED NEUTROPHILS RELATIVE PERCENT: 82.8 % (ref 36–66)
SODIUM BLD-SCNC: 135 MMOL/L (ref 135–145)
SOURCE: NORMAL
TOTAL IMMATURE NEUTOROPHIL: 0.03 K/CU MM
TOTAL NUCLEATED RBC: 0 K/CU MM
TOTAL PROTEIN: 8.6 GM/DL (ref 6.4–8.2)
TROPONIN T: 0.01 NG/ML
TSH SERPL DL<=0.005 MIU/L-ACNC: 4.16 UIU/ML (ref 0.27–4.2)
WBC # BLD: 6.9 K/CU MM (ref 4–10.5)

## 2023-09-02 PROCEDURE — 83735 ASSAY OF MAGNESIUM: CPT

## 2023-09-02 PROCEDURE — 99285 EMERGENCY DEPT VISIT HI MDM: CPT

## 2023-09-02 PROCEDURE — 2060000000 HC ICU INTERMEDIATE R&B

## 2023-09-02 PROCEDURE — 96375 TX/PRO/DX INJ NEW DRUG ADDON: CPT

## 2023-09-02 PROCEDURE — 83880 ASSAY OF NATRIURETIC PEPTIDE: CPT

## 2023-09-02 PROCEDURE — 6360000002 HC RX W HCPCS: Performed by: STUDENT IN AN ORGANIZED HEALTH CARE EDUCATION/TRAINING PROGRAM

## 2023-09-02 PROCEDURE — 87502 INFLUENZA DNA AMP PROBE: CPT

## 2023-09-02 PROCEDURE — 82248 BILIRUBIN DIRECT: CPT

## 2023-09-02 PROCEDURE — 82962 GLUCOSE BLOOD TEST: CPT

## 2023-09-02 PROCEDURE — 84443 ASSAY THYROID STIM HORMONE: CPT

## 2023-09-02 PROCEDURE — 81003 URINALYSIS AUTO W/O SCOPE: CPT

## 2023-09-02 PROCEDURE — 83690 ASSAY OF LIPASE: CPT

## 2023-09-02 PROCEDURE — 84484 ASSAY OF TROPONIN QUANT: CPT

## 2023-09-02 PROCEDURE — 6370000000 HC RX 637 (ALT 250 FOR IP): Performed by: STUDENT IN AN ORGANIZED HEALTH CARE EDUCATION/TRAINING PROGRAM

## 2023-09-02 PROCEDURE — 84145 PROCALCITONIN (PCT): CPT

## 2023-09-02 PROCEDURE — 80053 COMPREHEN METABOLIC PANEL: CPT

## 2023-09-02 PROCEDURE — 2580000003 HC RX 258: Performed by: STUDENT IN AN ORGANIZED HEALTH CARE EDUCATION/TRAINING PROGRAM

## 2023-09-02 PROCEDURE — 6360000004 HC RX CONTRAST MEDICATION: Performed by: STUDENT IN AN ORGANIZED HEALTH CARE EDUCATION/TRAINING PROGRAM

## 2023-09-02 PROCEDURE — 74174 CTA ABD&PLVS W/CONTRAST: CPT

## 2023-09-02 PROCEDURE — 96374 THER/PROPH/DIAG INJ IV PUSH: CPT

## 2023-09-02 PROCEDURE — 0202U NFCT DS 22 TRGT SARS-COV-2: CPT

## 2023-09-02 PROCEDURE — 93005 ELECTROCARDIOGRAM TRACING: CPT | Performed by: STUDENT IN AN ORGANIZED HEALTH CARE EDUCATION/TRAINING PROGRAM

## 2023-09-02 PROCEDURE — 85025 COMPLETE CBC W/AUTO DIFF WBC: CPT

## 2023-09-02 PROCEDURE — 87635 SARS-COV-2 COVID-19 AMP PRB: CPT

## 2023-09-02 PROCEDURE — 87040 BLOOD CULTURE FOR BACTERIA: CPT

## 2023-09-02 PROCEDURE — 83605 ASSAY OF LACTIC ACID: CPT

## 2023-09-02 RX ORDER — ONDANSETRON 2 MG/ML
4 INJECTION INTRAMUSCULAR; INTRAVENOUS ONCE
Status: COMPLETED | OUTPATIENT
Start: 2023-09-02 | End: 2023-09-02

## 2023-09-02 RX ORDER — POLYETHYLENE GLYCOL 3350 17 G/17G
17 POWDER, FOR SOLUTION ORAL DAILY PRN
Status: DISCONTINUED | OUTPATIENT
Start: 2023-09-02 | End: 2023-09-09 | Stop reason: HOSPADM

## 2023-09-02 RX ORDER — DEXTROSE MONOHYDRATE 100 MG/ML
INJECTION, SOLUTION INTRAVENOUS CONTINUOUS PRN
Status: DISCONTINUED | OUTPATIENT
Start: 2023-09-02 | End: 2023-09-09 | Stop reason: HOSPADM

## 2023-09-02 RX ORDER — 0.9 % SODIUM CHLORIDE 0.9 %
1000 INTRAVENOUS SOLUTION INTRAVENOUS ONCE
Status: COMPLETED | OUTPATIENT
Start: 2023-09-02 | End: 2023-09-02

## 2023-09-02 RX ORDER — AMLODIPINE BESYLATE 10 MG/1
10 TABLET ORAL DAILY
Status: DISCONTINUED | OUTPATIENT
Start: 2023-09-02 | End: 2023-09-03

## 2023-09-02 RX ORDER — DOXYCYCLINE HYCLATE 50 MG/1
324 CAPSULE, GELATIN COATED ORAL
Status: DISCONTINUED | OUTPATIENT
Start: 2023-09-03 | End: 2023-09-09 | Stop reason: HOSPADM

## 2023-09-02 RX ORDER — FOLIC ACID 1 MG/1
1 TABLET ORAL DAILY
Status: DISCONTINUED | OUTPATIENT
Start: 2023-09-02 | End: 2023-09-09 | Stop reason: HOSPADM

## 2023-09-02 RX ORDER — SODIUM CHLORIDE 9 MG/ML
INJECTION, SOLUTION INTRAVENOUS CONTINUOUS
Status: DISCONTINUED | OUTPATIENT
Start: 2023-09-02 | End: 2023-09-04

## 2023-09-02 RX ORDER — ACETAMINOPHEN 650 MG/1
650 SUPPOSITORY RECTAL EVERY 6 HOURS PRN
Status: DISCONTINUED | OUTPATIENT
Start: 2023-09-02 | End: 2023-09-09 | Stop reason: HOSPADM

## 2023-09-02 RX ORDER — INSULIN LISPRO 100 [IU]/ML
0-4 INJECTION, SOLUTION INTRAVENOUS; SUBCUTANEOUS NIGHTLY
Status: DISCONTINUED | OUTPATIENT
Start: 2023-09-02 | End: 2023-09-09 | Stop reason: HOSPADM

## 2023-09-02 RX ORDER — ALLOPURINOL 100 MG/1
300 TABLET ORAL DAILY
Status: DISCONTINUED | OUTPATIENT
Start: 2023-09-02 | End: 2023-09-09 | Stop reason: HOSPADM

## 2023-09-02 RX ORDER — INSULIN LISPRO 100 [IU]/ML
0-8 INJECTION, SOLUTION INTRAVENOUS; SUBCUTANEOUS
Status: DISCONTINUED | OUTPATIENT
Start: 2023-09-03 | End: 2023-09-09 | Stop reason: HOSPADM

## 2023-09-02 RX ORDER — LANOLIN ALCOHOL/MO/W.PET/CERES
1000 CREAM (GRAM) TOPICAL DAILY
Status: DISCONTINUED | OUTPATIENT
Start: 2023-09-02 | End: 2023-09-09 | Stop reason: HOSPADM

## 2023-09-02 RX ORDER — IPRATROPIUM BROMIDE AND ALBUTEROL SULFATE 2.5; .5 MG/3ML; MG/3ML
1 SOLUTION RESPIRATORY (INHALATION)
Status: DISCONTINUED | OUTPATIENT
Start: 2023-09-02 | End: 2023-09-09 | Stop reason: HOSPADM

## 2023-09-02 RX ORDER — HYDROCODONE BITARTRATE AND ACETAMINOPHEN 5; 325 MG/1; MG/1
1 TABLET ORAL ONCE
Status: COMPLETED | OUTPATIENT
Start: 2023-09-02 | End: 2023-09-02

## 2023-09-02 RX ORDER — ASPIRIN 81 MG/1
81 TABLET, CHEWABLE ORAL DAILY
Status: DISCONTINUED | OUTPATIENT
Start: 2023-09-03 | End: 2023-09-09 | Stop reason: HOSPADM

## 2023-09-02 RX ORDER — SODIUM CHLORIDE 0.9 % (FLUSH) 0.9 %
5-40 SYRINGE (ML) INJECTION EVERY 12 HOURS SCHEDULED
Status: DISCONTINUED | OUTPATIENT
Start: 2023-09-02 | End: 2023-09-09 | Stop reason: HOSPADM

## 2023-09-02 RX ORDER — GUAIFENESIN 600 MG/1
600 TABLET, EXTENDED RELEASE ORAL 2 TIMES DAILY
Status: DISCONTINUED | OUTPATIENT
Start: 2023-09-02 | End: 2023-09-09 | Stop reason: HOSPADM

## 2023-09-02 RX ORDER — SODIUM CHLORIDE 0.9 % (FLUSH) 0.9 %
5-40 SYRINGE (ML) INJECTION PRN
Status: DISCONTINUED | OUTPATIENT
Start: 2023-09-02 | End: 2023-09-09 | Stop reason: HOSPADM

## 2023-09-02 RX ORDER — ONDANSETRON 4 MG/1
4 TABLET, ORALLY DISINTEGRATING ORAL EVERY 8 HOURS PRN
Status: DISCONTINUED | OUTPATIENT
Start: 2023-09-02 | End: 2023-09-09 | Stop reason: HOSPADM

## 2023-09-02 RX ORDER — ENOXAPARIN SODIUM 100 MG/ML
30 INJECTION SUBCUTANEOUS DAILY
Status: DISCONTINUED | OUTPATIENT
Start: 2023-09-02 | End: 2023-09-09 | Stop reason: HOSPADM

## 2023-09-02 RX ORDER — PANTOPRAZOLE SODIUM 40 MG/1
40 TABLET, DELAYED RELEASE ORAL
Status: DISCONTINUED | OUTPATIENT
Start: 2023-09-03 | End: 2023-09-09 | Stop reason: HOSPADM

## 2023-09-02 RX ORDER — DOXYCYCLINE HYCLATE 100 MG
100 TABLET ORAL ONCE
Status: DISCONTINUED | OUTPATIENT
Start: 2023-09-02 | End: 2023-09-02

## 2023-09-02 RX ORDER — SODIUM CHLORIDE 9 MG/ML
INJECTION, SOLUTION INTRAVENOUS PRN
Status: DISCONTINUED | OUTPATIENT
Start: 2023-09-02 | End: 2023-09-09 | Stop reason: HOSPADM

## 2023-09-02 RX ORDER — ACETAMINOPHEN 325 MG/1
650 TABLET ORAL EVERY 6 HOURS PRN
Status: DISCONTINUED | OUTPATIENT
Start: 2023-09-02 | End: 2023-09-09 | Stop reason: HOSPADM

## 2023-09-02 RX ORDER — INSULIN GLARGINE 100 [IU]/ML
8 INJECTION, SOLUTION SUBCUTANEOUS EVERY MORNING
Status: DISCONTINUED | OUTPATIENT
Start: 2023-09-03 | End: 2023-09-09 | Stop reason: HOSPADM

## 2023-09-02 RX ORDER — ATORVASTATIN CALCIUM 40 MG/1
40 TABLET, FILM COATED ORAL NIGHTLY
Status: DISCONTINUED | OUTPATIENT
Start: 2023-09-02 | End: 2023-09-09 | Stop reason: HOSPADM

## 2023-09-02 RX ORDER — GLUCAGON 1 MG/ML
1 KIT INJECTION PRN
Status: DISCONTINUED | OUTPATIENT
Start: 2023-09-02 | End: 2023-09-09 | Stop reason: HOSPADM

## 2023-09-02 RX ORDER — ONDANSETRON 2 MG/ML
4 INJECTION INTRAMUSCULAR; INTRAVENOUS EVERY 6 HOURS PRN
Status: DISCONTINUED | OUTPATIENT
Start: 2023-09-02 | End: 2023-09-09 | Stop reason: HOSPADM

## 2023-09-02 RX ADMIN — FOLIC ACID 1 MG: 1 TABLET ORAL at 21:39

## 2023-09-02 RX ADMIN — SODIUM CHLORIDE, PRESERVATIVE FREE 10 ML: 5 INJECTION INTRAVENOUS at 21:27

## 2023-09-02 RX ADMIN — IOPAMIDOL 68 ML: 755 INJECTION, SOLUTION INTRAVENOUS at 12:21

## 2023-09-02 RX ADMIN — SODIUM CHLORIDE 1000 ML: 9 INJECTION, SOLUTION INTRAVENOUS at 16:23

## 2023-09-02 RX ADMIN — ENOXAPARIN SODIUM 30 MG: 100 INJECTION SUBCUTANEOUS at 21:39

## 2023-09-02 RX ADMIN — VANCOMYCIN HYDROCHLORIDE 1000 MG: 1 INJECTION, POWDER, LYOPHILIZED, FOR SOLUTION INTRAVENOUS at 22:46

## 2023-09-02 RX ADMIN — PIPERACILLIN AND TAZOBACTAM 4500 MG: 4; .5 INJECTION, POWDER, FOR SOLUTION INTRAVENOUS at 14:48

## 2023-09-02 RX ADMIN — GUAIFENESIN 600 MG: 600 TABLET, EXTENDED RELEASE ORAL at 21:38

## 2023-09-02 RX ADMIN — SODIUM CHLORIDE 1000 ML: 9 INJECTION, SOLUTION INTRAVENOUS at 12:37

## 2023-09-02 RX ADMIN — SODIUM CHLORIDE 1000 ML: 9 INJECTION, SOLUTION INTRAVENOUS at 11:41

## 2023-09-02 RX ADMIN — CEFEPIME 2000 MG: 2 INJECTION, POWDER, FOR SOLUTION INTRAVENOUS at 21:33

## 2023-09-02 RX ADMIN — CYANOCOBALAMIN TAB 1000 MCG 1000 MCG: 1000 TAB at 21:39

## 2023-09-02 RX ADMIN — ATORVASTATIN CALCIUM 40 MG: 40 TABLET, FILM COATED ORAL at 21:39

## 2023-09-02 RX ADMIN — ONDANSETRON 4 MG: 2 INJECTION INTRAMUSCULAR; INTRAVENOUS at 11:41

## 2023-09-02 RX ADMIN — SODIUM CHLORIDE 25 ML: 9 INJECTION, SOLUTION INTRAVENOUS at 21:32

## 2023-09-02 RX ADMIN — HYDROCODONE BITARTRATE AND ACETAMINOPHEN 1 TABLET: 5; 325 TABLET ORAL at 11:42

## 2023-09-02 ASSESSMENT — LIFESTYLE VARIABLES
HOW OFTEN DO YOU HAVE A DRINK CONTAINING ALCOHOL: NEVER
HOW MANY STANDARD DRINKS CONTAINING ALCOHOL DO YOU HAVE ON A TYPICAL DAY: PATIENT DOES NOT DRINK

## 2023-09-02 ASSESSMENT — PAIN SCALES - GENERAL
PAINLEVEL_OUTOF10: 8
PAINLEVEL_OUTOF10: 0

## 2023-09-02 NOTE — CARE COORDINATION
MCG criteria for pneumonia reviewed. At this time, criteria supports inpatient. Diet, DASH/TLC:   Sodium & Cholesterol Restricted (05-04-23 @ 10:41) [Active]

## 2023-09-02 NOTE — H&P
V2.0  History and Physical      Name:  Curtis Angelucci /Age/Sex:   (74 y.o. male)   MRN & CSN:  [id number] & [id number] Encounter Date/Time: 2023 2:30 PM EDT   Location:  [address] [place] [address] PCP: No primary care provider on file. Hospital Day: 1    Assessment and Plan:   Curtis Angelucci is a 74 y.o. male who presents with Pneumonia, unspecified organism    Hospital Problems             Last Modified POA    * (Principal) Pneumonia, unspecified organism 2023 Yes       Plan:    Severe sepsis 2/2 pneumonia  Acute hypoxic respiratory failure 2/2 pneumonia  History of COPD  History of lung cancer -s/p resection, no systemic treatment  -Pt recently discharged from hospital.  Presents with SOB, hypoxic on arrival requiring 3 L oxygen (no baseline oxygen requirement). -Procalcitonin 2.23, Lactic acid 2.7 (normalized). Rapid flu and COVID-negative.   -CTA chest/abdomen/pelvis done in ED shows moderate right lower lobe airspace disease concerning for pneumonia, right lobectomy  -S/p sepsis fluids, continue maintenance IVF  -Initiated vancomycin/cefepime  -Follow infectious/inflammatory work-up    DORCAS on CKD stage III -likely pre-renal in setting of poor PO intake  -Creatinine 2.6 from new baseline 1.7-1.9  -Bladder scan, strict I/Os, UA  -IVF hydration  -Nephrology consulted    Irregular rhythm -concern for new onset Afib  -Noted on EKG, repeat, maintain telemetry  -Will not anticoagulate as very recent hx of concern for GIB (2023)  -Hold home metoprolol as BP soft    Elevated troponin  -Patient without chest pain  -Troponin 0.012, EKG without ST elevation/depression  -Trend troponin, maintain telemetry  -Cardiology consulted    Insulin-dependent diabetes mellitus type 2  -Continue Lantus  -POCT glucose, SSI, hypoglycemia protocol    Hypertension  -Hold home antihypertensives as BP soft    History of abdominal aortic aneurysm  -Stable 4 x 3.3 cm infrarenal abdominal aortic aneurysm  -Close follow-up 08/26/2023 12:31 PM    LEUKOCYTESUR NEGATIVE 08/26/2023 12:31 PM    UROBILINOGEN 0.2 08/26/2023 12:31 PM    BILIRUBINUR NEGATIVE 08/26/2023 12:31 PM    BLOODU NEGATIVE 08/26/2023 12:31 PM    GLUCOSEU NEGATIVE 04/25/2012 03:46 PM    KETUA NEGATIVE 08/26/2023 12:31 PM     Urine Cultures: No results found for: Rachael Batty  Blood Cultures: No results found for: BC  No results found for: BLOODCULT2  Organism: No results found for: ORG    Imaging/Diagnostics Last 24 Hours   CTA CHEST ABDOMEN PELVIS W CONTRAST    Result Date: 9/2/2023  Moderate right lower lung airspace disease compatible with pneumonia. Right lobectomy. Moderate COPD greater on the right Stable 4.0 x 3.3 cm infrarenal abdominal aortic aneurysm. Follow-up examination in 1 year is recommended. Vascular consultation is recommended The thoracic and abdominal aorta are not opacified with contrast due to the phase of imaging.  Mild pulmonary artery enlargement compatible with pulmonary hypertension Stable 1 cm lucent lesion within the right side of the L3 vertebral body may represent small myeloma lesion Old T11 compression fracture RECOMMENDATIONS:         Electronically signed by Lissette Goodwin MD on 9/2/2023 at 9:38 PM

## 2023-09-02 NOTE — ED PROVIDER NOTES
PLACEMENT      CORONARY ARTERY BYPASS GRAFT      CYSTOSCOPY N/A 05/09/2017    cysto, left rgpg, left stent    KIDNEY STONE REMOVAL      UPPER GASTROINTESTINAL ENDOSCOPY N/A 08/09/2023    EGD BIOPSIES to rule out H-Pyloric performed by Luisa Roth MD at [facility] ENDOSCOPY     Patient Active Problem List   Diagnosis Code    Angina pectoris ([address]) I20.9    Shoulder pain, left M25.512    Neck pain on left side M54.2    Essential hypertension, benign I10    Other and unspecified hyperlipidemia E78.5    Coronary atherosclerosis I25.10    Type II or unspecified type diabetes mellitus without mention of complication, not stated as uncontrolled E11.9    Smoker F17.200    Ureteral calculus, left N20.1    Macrocytic anemia D53.9    Multiple myeloma (HCC) C90.00    Iron deficiency anemia D50.9    Anemia due to GI blood loss D50.0    Severe malnutrition (HCC) E43    Hyperkalemia E87.5    Need for hepatitis B screening test Z11.59     Family History   Problem Relation Age of Onset    Diabetes Mother     Stroke Father     Diabetes Father     Cancer Brother         throat    Diabetes Brother     Diabetes Maternal Grandmother        Current Facility-Administered Medications:     piperacillin-tazobactam (ZOSYN) 4,500 mg in sodium chloride 0.9 % 100 mL IVPB (mini-bag), 4,500 mg, IntraVENous, Once, Jin Niño MD    doxycycline hyclate (VIBRA-TABS) tablet 100 mg, 100 mg, Oral, Once, Jin Niño MD    Current Outpatient Medications:     lenalidomide (REVLIMID) 10 MG chemo capsule, Take 1 capsule by mouth daily for 21 days on then 7 days off every 28 days. , Disp: 21 capsule, Rfl: 0    sodium zirconium cyclosilicate (LOKELMA) 10 g PACK oral suspension, Take 1 packet by mouth daily, Disp: 30 each, Rfl: 5    allopurinol (ZYLOPRIM) 300 MG tablet, Take 1 tablet by mouth daily, Disp: 30 tablet, Rfl: 2    insulin glargine (LANTUS) 100 UNIT/ML injection vial, Inject 8 Units into the skin every morning, Disp: 10 mL, Rfl: 0    folic acid DIFFERENTIAL - Abnormal; Notable for the following components:       Result Value    RBC 2.70 (*)     Hemoglobin 8.8 (*)     Hematocrit 29.6 (*)     .6 (*)     MCH 32.6 (*)     MCHC 29.7 (*)     RDW 17.6 (*)     Segs Relative 82.8 (*)     Lymphocytes % 8.1 (*)     Monocytes % 8.4 (*)     All other components within normal limits   TROPONIN - Abnormal; Notable for the following components:    Troponin T 0.012 (*)     All other components within normal limits   BASIC METABOLIC PANEL - Abnormal; Notable for the following components:    Chloride 95 (*)     Anion Gap 19 (*)     BUN 63 (*)     Creatinine 2.6 (*)     Est, Glom Filt Rate 25 (*)     Glucose 257 (*)     Calcium 10.7 (*)     All other components within normal limits   HEPATIC FUNCTION PANEL - Abnormal; Notable for the following components:    Albumin 3.1 (*)     AST 14 (*)     ALT 8 (*)     Total Protein 8.6 (*)     All other components within normal limits   LACTATE, SEPSIS - Abnormal; Notable for the following components:    Lactic Acid, Sepsis 2.7 (*)     All other components within normal limits   COVID-19, RAPID   INFLUENZA A/B, MOLECULAR   CULTURE, BLOOD 1   CULTURE, BLOOD 1   RAPID INFLUENZA A/B ANTIGENS   MAGNESIUM   LIPASE   TSH WITH REFLEX   PROCALCITONIN   URINALYSIS WITH REFLEX TO CULTURE   LACTATE, SEPSIS   BRAIN NATRIURETIC PEPTIDE       MEDICATION CHANGES     New Prescriptions    No medications on file       FINAL IMPRESSION      1. Sepsis with acute hypoxic respiratory failure without septic shock, due to unspecified organism ([address])    2. Acute kidney injury superimposed on CKD (HCC)    3. Elevated troponin    4. Acute respiratory failure with hypoxia (HCC)    5.  Pneumonia of right lower lobe due to infectious organism          FINAL DISPOSITION     Final diagnoses:   Acute kidney injury superimposed on CKD (HCC)   Elevated troponin   Acute respiratory failure with hypoxia (HCC)   Sepsis with acute hypoxic respiratory failure without

## 2023-09-02 NOTE — ED NOTES
Pt to the ED via EMS with c/o abd pain and fatigue. Pt states he has been admitted to the hospital many times. Pt states he is having a hard time getting around at home. Pt states his legs got weak when walking to the BR and his son had to help him back. When pt arrived to ED pt's 02 was 87% on RA. 3L NC applied pt currently at 92%.       Francisco Christensen RN  09/02/23 1772

## 2023-09-02 NOTE — ED NOTES
ED TO INPATIENT SBAR HANDOFF    Patient Name: Giovanni Miranda   :    74 y.o. Preferred Name  Baldemar Morrow  Family/Caregiver Present no   Restraints no   C-SSRS: Risk of Suicide: No Risk  Sitter no   Sepsis Risk Score Sepsis Risk Score: 3.22      Situation  Chief Complaint   Patient presents with    Abdominal Pain     Brief Description of Patient's Condition: Pt to the ED via EMS with c/o LLQ pain and fatigue. Pt states he has been in and out of the hospital. Pt states he is getting weaker and weaker. Has been unable to eat. Mental Status: oriented and alert  Arrived from: home    Imaging:   CTA CHEST ABDOMEN PELVIS W CONTRAST   Preliminary Result   Moderate right lower lung airspace disease compatible with pneumonia. Right   lobectomy. Moderate COPD greater on the right      Stable 4.0 x 3.3 cm infrarenal abdominal aortic aneurysm. Follow-up   examination in 1 year is recommended. Vascular consultation is recommended      The thoracic and abdominal aorta are not opacified with contrast due to the   phase of imaging.       Mild pulmonary artery enlargement compatible with pulmonary hypertension      Stable 1 cm lucent lesion within the right side of the L3 vertebral body may   represent small myeloma lesion      Old T11 compression fracture      RECOMMENDATIONS:           Abnormal labs:   Abnormal Labs Reviewed   CBC WITH AUTO DIFFERENTIAL - Abnormal; Notable for the following components:       Result Value    RBC 2.70 (*)     Hemoglobin 8.8 (*)     Hematocrit 29.6 (*)     .6 (*)     MCH 32.6 (*)     MCHC 29.7 (*)     RDW 17.6 (*)     Segs Relative 82.8 (*)     Lymphocytes % 8.1 (*)     Monocytes % 8.4 (*)     All other components within normal limits   TROPONIN - Abnormal; Notable for the following components:    Troponin T 0.012 (*)     All other components within normal limits   BASIC METABOLIC PANEL - Abnormal; Notable for the following components:    Chloride 95 (*)     Anion Gap 19 (*)

## 2023-09-03 PROBLEM — A41.9 SEPSIS WITH ACUTE HYPOXIC RESPIRATORY FAILURE WITHOUT SEPTIC SHOCK (HCC): Status: ACTIVE | Noted: 2023-09-03

## 2023-09-03 PROBLEM — R65.20 SEPSIS WITH ACUTE HYPOXIC RESPIRATORY FAILURE WITHOUT SEPTIC SHOCK (HCC): Status: ACTIVE | Noted: 2023-09-03

## 2023-09-03 PROBLEM — J96.01 SEPSIS WITH ACUTE HYPOXIC RESPIRATORY FAILURE WITHOUT SEPTIC SHOCK (HCC): Status: ACTIVE | Noted: 2023-09-03

## 2023-09-03 LAB
ALBUMIN SERPL-MCNC: 2.6 GM/DL (ref 3.4–5)
ALP BLD-CCNC: 110 IU/L (ref 40–128)
ALT SERPL-CCNC: 10 U/L (ref 10–40)
ANION GAP SERPL CALCULATED.3IONS-SCNC: 14 MMOL/L (ref 4–16)
AST SERPL-CCNC: 21 IU/L (ref 15–37)
B PARAP IS1001 DNA NPH QL NAA+NON-PROBE: NOT DETECTED
B PERT.PT PRMT NPH QL NAA+NON-PROBE: NOT DETECTED
BASOPHILS ABSOLUTE: 0 K/CU MM
BASOPHILS RELATIVE PERCENT: 0.4 % (ref 0–1)
BILIRUB SERPL-MCNC: 0.2 MG/DL (ref 0–1)
BUN SERPL-MCNC: 54 MG/DL (ref 6–23)
C PNEUM DNA NPH QL NAA+NON-PROBE: NOT DETECTED
CALCIUM SERPL-MCNC: 8.8 MG/DL (ref 8.3–10.6)
CHLORIDE BLD-SCNC: 103 MMOL/L (ref 99–110)
CO2: 18 MMOL/L (ref 21–32)
CREAT SERPL-MCNC: 2.3 MG/DL (ref 0.9–1.3)
CRP SERPL HS-MCNC: 64.3 MG/L
DIFFERENTIAL TYPE: ABNORMAL
DOSE AMOUNT: NORMAL
DOSE TIME: NORMAL
EOSINOPHILS ABSOLUTE: 0.1 K/CU MM
EOSINOPHILS RELATIVE PERCENT: 1.2 % (ref 0–3)
ESTIMATED AVERAGE GLUCOSE: 192 MG/DL
FERRITIN: 296 NG/ML (ref 30–400)
FLUAV H1 2009 PAN RNA NPH NAA+NON-PROBE: NOT DETECTED
FLUAV H1 RNA NPH QL NAA+NON-PROBE: NOT DETECTED
FLUAV H3 RNA NPH QL NAA+NON-PROBE: NOT DETECTED
FLUAV RNA NPH QL NAA+NON-PROBE: NOT DETECTED
FLUBV RNA NPH QL NAA+NON-PROBE: NOT DETECTED
GFR SERPL CREATININE-BSD FRML MDRD: 29 ML/MIN/1.73M2
GLUCOSE BLD-MCNC: 206 MG/DL (ref 70–99)
GLUCOSE BLD-MCNC: 214 MG/DL (ref 70–99)
GLUCOSE BLD-MCNC: 242 MG/DL (ref 70–99)
GLUCOSE BLD-MCNC: 278 MG/DL (ref 70–99)
GLUCOSE SERPL-MCNC: 196 MG/DL (ref 70–99)
HADV DNA NPH QL NAA+NON-PROBE: NOT DETECTED
HBA1C MFR BLD: 8.3 % (ref 4.2–6.3)
HCOV 229E RNA NPH QL NAA+NON-PROBE: NOT DETECTED
HCOV HKU1 RNA NPH QL NAA+NON-PROBE: NOT DETECTED
HCOV NL63 RNA NPH QL NAA+NON-PROBE: NOT DETECTED
HCOV OC43 RNA NPH QL NAA+NON-PROBE: NOT DETECTED
HCT VFR BLD CALC: 26.3 % (ref 42–52)
HEMOGLOBIN: 7.5 GM/DL (ref 13.5–18)
HMPV RNA NPH QL NAA+NON-PROBE: NOT DETECTED
HPIV1 RNA NPH QL NAA+NON-PROBE: NOT DETECTED
HPIV2 RNA NPH QL NAA+NON-PROBE: NOT DETECTED
HPIV3 RNA NPH QL NAA+NON-PROBE: NOT DETECTED
HPIV4 RNA NPH QL NAA+NON-PROBE: NOT DETECTED
IMMATURE NEUTROPHIL %: 0.4 % (ref 0–0.43)
IRON: 34 UG/DL (ref 59–158)
LACTATE: 1.1 MMOL/L (ref 0.5–1.9)
LYMPHOCYTES ABSOLUTE: 0.7 K/CU MM
LYMPHOCYTES RELATIVE PERCENT: 13.8 % (ref 24–44)
M PNEUMO DNA NPH QL NAA+NON-PROBE: NOT DETECTED
MAGNESIUM: 1.9 MG/DL (ref 1.8–2.4)
MCH RBC QN AUTO: 32.1 PG (ref 27–31)
MCHC RBC AUTO-ENTMCNC: 28.5 % (ref 32–36)
MCV RBC AUTO: 112.4 FL (ref 78–100)
MONOCYTES ABSOLUTE: 0.5 K/CU MM
MONOCYTES RELATIVE PERCENT: 9.1 % (ref 0–4)
NUCLEATED RBC %: 0 %
PCT TRANSFERRIN: 30 % (ref 10–44)
PDW BLD-RTO: 17.5 % (ref 11.7–14.9)
PLATELET # BLD: 209 K/CU MM (ref 140–440)
PMV BLD AUTO: 10.5 FL (ref 7.5–11.1)
POTASSIUM SERPL-SCNC: 3.7 MMOL/L (ref 3.5–5.1)
PROCALCITONIN SERPL-MCNC: 1.93 NG/ML
RBC # BLD: 2.34 M/CU MM (ref 4.6–6.2)
REASON FOR REJECTION: NORMAL
REJECTED TEST: NORMAL
RSV RNA NPH QL NAA+NON-PROBE: NOT DETECTED
RV+EV RNA NPH QL NAA+NON-PROBE: NOT DETECTED
SARS-COV-2 RNA NPH QL NAA+NON-PROBE: NOT DETECTED
SEGMENTED NEUTROPHILS ABSOLUTE COUNT: 3.7 K/CU MM
SEGMENTED NEUTROPHILS RELATIVE PERCENT: 75.1 % (ref 36–66)
SODIUM BLD-SCNC: 135 MMOL/L (ref 135–145)
TOTAL IMMATURE NEUTOROPHIL: 0.02 K/CU MM
TOTAL IRON BINDING CAPACITY: 115 UG/DL (ref 250–450)
TOTAL NUCLEATED RBC: 0 K/CU MM
TOTAL PROTEIN: 7.2 GM/DL (ref 6.4–8.2)
TRANSFERRIN SERPL-MCNC: 97.2 MG/DL (ref 200–360)
TROPONIN T: <0.01 NG/ML
TROPONIN T: <0.01 NG/ML
UNSATURATED IRON BINDING CAPACITY: 81 UG/DL (ref 110–370)
VANCOMYCIN RANDOM: 10.6 UG/ML
WBC # BLD: 4.9 K/CU MM (ref 4–10.5)

## 2023-09-03 PROCEDURE — 84466 ASSAY OF TRANSFERRIN: CPT

## 2023-09-03 PROCEDURE — 85025 COMPLETE CBC W/AUTO DIFF WBC: CPT

## 2023-09-03 PROCEDURE — 80053 COMPREHEN METABOLIC PANEL: CPT

## 2023-09-03 PROCEDURE — 93005 ELECTROCARDIOGRAM TRACING: CPT

## 2023-09-03 PROCEDURE — 86140 C-REACTIVE PROTEIN: CPT

## 2023-09-03 PROCEDURE — 84145 PROCALCITONIN (PCT): CPT

## 2023-09-03 PROCEDURE — 83735 ASSAY OF MAGNESIUM: CPT

## 2023-09-03 PROCEDURE — 6370000000 HC RX 637 (ALT 250 FOR IP): Performed by: INTERNAL MEDICINE

## 2023-09-03 PROCEDURE — 82728 ASSAY OF FERRITIN: CPT

## 2023-09-03 PROCEDURE — 84484 ASSAY OF TROPONIN QUANT: CPT

## 2023-09-03 PROCEDURE — 83036 HEMOGLOBIN GLYCOSYLATED A1C: CPT

## 2023-09-03 PROCEDURE — 82962 GLUCOSE BLOOD TEST: CPT

## 2023-09-03 PROCEDURE — 6370000000 HC RX 637 (ALT 250 FOR IP): Performed by: STUDENT IN AN ORGANIZED HEALTH CARE EDUCATION/TRAINING PROGRAM

## 2023-09-03 PROCEDURE — 99222 1ST HOSP IP/OBS MODERATE 55: CPT | Performed by: INTERNAL MEDICINE

## 2023-09-03 PROCEDURE — 80048 BASIC METABOLIC PNL TOTAL CA: CPT

## 2023-09-03 PROCEDURE — 94640 AIRWAY INHALATION TREATMENT: CPT

## 2023-09-03 PROCEDURE — 2580000003 HC RX 258: Performed by: STUDENT IN AN ORGANIZED HEALTH CARE EDUCATION/TRAINING PROGRAM

## 2023-09-03 PROCEDURE — 83540 ASSAY OF IRON: CPT

## 2023-09-03 PROCEDURE — 6360000002 HC RX W HCPCS: Performed by: STUDENT IN AN ORGANIZED HEALTH CARE EDUCATION/TRAINING PROGRAM

## 2023-09-03 PROCEDURE — 36415 COLL VENOUS BLD VENIPUNCTURE: CPT

## 2023-09-03 PROCEDURE — 94761 N-INVAS EAR/PLS OXIMETRY MLT: CPT

## 2023-09-03 PROCEDURE — 80202 ASSAY OF VANCOMYCIN: CPT

## 2023-09-03 PROCEDURE — 83605 ASSAY OF LACTIC ACID: CPT

## 2023-09-03 PROCEDURE — 2060000000 HC ICU INTERMEDIATE R&B

## 2023-09-03 PROCEDURE — 99223 1ST HOSP IP/OBS HIGH 75: CPT | Performed by: INTERNAL MEDICINE

## 2023-09-03 PROCEDURE — 6370000000 HC RX 637 (ALT 250 FOR IP)

## 2023-09-03 RX ORDER — METOPROLOL TARTRATE 50 MG/1
50 TABLET, FILM COATED ORAL 2 TIMES DAILY
Status: DISCONTINUED | OUTPATIENT
Start: 2023-09-03 | End: 2023-09-05

## 2023-09-03 RX ORDER — OXYCODONE HYDROCHLORIDE 5 MG/1
5 TABLET ORAL ONCE
Status: COMPLETED | OUTPATIENT
Start: 2023-09-03 | End: 2023-09-03

## 2023-09-03 RX ADMIN — ASPIRIN 81 MG CHEWABLE TABLET 81 MG: 81 TABLET CHEWABLE at 08:17

## 2023-09-03 RX ADMIN — SODIUM CHLORIDE: 9 INJECTION, SOLUTION INTRAVENOUS at 08:34

## 2023-09-03 RX ADMIN — FERROUS GLUCONATE 324 MG: 324 TABLET ORAL at 08:17

## 2023-09-03 RX ADMIN — INSULIN LISPRO 2 UNITS: 100 INJECTION, SOLUTION INTRAVENOUS; SUBCUTANEOUS at 13:00

## 2023-09-03 RX ADMIN — METOPROLOL TARTRATE 25 MG: 25 TABLET, FILM COATED ORAL at 08:17

## 2023-09-03 RX ADMIN — GUAIFENESIN 600 MG: 600 TABLET, EXTENDED RELEASE ORAL at 21:54

## 2023-09-03 RX ADMIN — CYANOCOBALAMIN TAB 1000 MCG 1000 MCG: 1000 TAB at 08:17

## 2023-09-03 RX ADMIN — ATORVASTATIN CALCIUM 40 MG: 40 TABLET, FILM COATED ORAL at 21:54

## 2023-09-03 RX ADMIN — SODIUM ZIRCONIUM CYCLOSILICATE 5 G: 5 POWDER, FOR SUSPENSION ORAL at 14:03

## 2023-09-03 RX ADMIN — INSULIN GLARGINE 8 UNITS: 100 INJECTION, SOLUTION SUBCUTANEOUS at 09:43

## 2023-09-03 RX ADMIN — INSULIN LISPRO 2 UNITS: 100 INJECTION, SOLUTION INTRAVENOUS; SUBCUTANEOUS at 17:38

## 2023-09-03 RX ADMIN — PANTOPRAZOLE SODIUM 40 MG: 40 TABLET, DELAYED RELEASE ORAL at 06:22

## 2023-09-03 RX ADMIN — SODIUM CHLORIDE 25 ML: 9 INJECTION, SOLUTION INTRAVENOUS at 21:59

## 2023-09-03 RX ADMIN — GUAIFENESIN 600 MG: 600 TABLET, EXTENDED RELEASE ORAL at 08:18

## 2023-09-03 RX ADMIN — ENOXAPARIN SODIUM 30 MG: 100 INJECTION SUBCUTANEOUS at 08:18

## 2023-09-03 RX ADMIN — SODIUM CHLORIDE, PRESERVATIVE FREE 10 ML: 5 INJECTION INTRAVENOUS at 08:19

## 2023-09-03 RX ADMIN — OXYCODONE HYDROCHLORIDE 5 MG: 5 TABLET ORAL at 03:11

## 2023-09-03 RX ADMIN — METOPROLOL TARTRATE 50 MG: 50 TABLET, FILM COATED ORAL at 21:54

## 2023-09-03 RX ADMIN — VANCOMYCIN HYDROCHLORIDE 1000 MG: 1 INJECTION, POWDER, LYOPHILIZED, FOR SOLUTION INTRAVENOUS at 11:41

## 2023-09-03 RX ADMIN — IPRATROPIUM BROMIDE AND ALBUTEROL SULFATE 1 DOSE: 2.5; .5 SOLUTION RESPIRATORY (INHALATION) at 16:07

## 2023-09-03 RX ADMIN — INSULIN LISPRO 4 UNITS: 100 INJECTION, SOLUTION INTRAVENOUS; SUBCUTANEOUS at 08:18

## 2023-09-03 RX ADMIN — FOLIC ACID 1 MG: 1 TABLET ORAL at 08:17

## 2023-09-03 RX ADMIN — IPRATROPIUM BROMIDE AND ALBUTEROL SULFATE 1 DOSE: 2.5; .5 SOLUTION RESPIRATORY (INHALATION) at 08:26

## 2023-09-03 RX ADMIN — IPRATROPIUM BROMIDE AND ALBUTEROL SULFATE 1 DOSE: 2.5; .5 SOLUTION RESPIRATORY (INHALATION) at 12:07

## 2023-09-03 RX ADMIN — SODIUM ZIRCONIUM CYCLOSILICATE 5 G: 5 POWDER, FOR SUSPENSION ORAL at 21:55

## 2023-09-03 RX ADMIN — SODIUM ZIRCONIUM CYCLOSILICATE 5 G: 5 POWDER, FOR SUSPENSION ORAL at 08:18

## 2023-09-03 RX ADMIN — CEFEPIME 1000 MG: 1 INJECTION, POWDER, FOR SOLUTION INTRAMUSCULAR; INTRAVENOUS at 08:37

## 2023-09-03 RX ADMIN — HYDROMORPHONE HYDROCHLORIDE 0.5 MG: 1 INJECTION, SOLUTION INTRAMUSCULAR; INTRAVENOUS; SUBCUTANEOUS at 09:44

## 2023-09-03 RX ADMIN — CEFEPIME 1000 MG: 1 INJECTION, POWDER, FOR SOLUTION INTRAMUSCULAR; INTRAVENOUS at 21:59

## 2023-09-03 RX ADMIN — SODIUM CHLORIDE, PRESERVATIVE FREE 10 ML: 5 INJECTION INTRAVENOUS at 21:57

## 2023-09-03 ASSESSMENT — PAIN DESCRIPTION - LOCATION
LOCATION: SHOULDER;GENERALIZED
LOCATION: SHOULDER

## 2023-09-03 ASSESSMENT — PAIN DESCRIPTION - ORIENTATION
ORIENTATION: RIGHT
ORIENTATION: RIGHT

## 2023-09-03 ASSESSMENT — PAIN SCALES - GENERAL
PAINLEVEL_OUTOF10: 7
PAINLEVEL_OUTOF10: 1
PAINLEVEL_OUTOF10: 2
PAINLEVEL_OUTOF10: 2
PAINLEVEL_OUTOF10: 1
PAINLEVEL_OUTOF10: 0
PAINLEVEL_OUTOF10: 7
PAINLEVEL_OUTOF10: 0
PAINLEVEL_OUTOF10: 2

## 2023-09-03 ASSESSMENT — PAIN - FUNCTIONAL ASSESSMENT: PAIN_FUNCTIONAL_ASSESSMENT: ACTIVITIES ARE NOT PREVENTED

## 2023-09-03 ASSESSMENT — PAIN DESCRIPTION - DESCRIPTORS
DESCRIPTORS: ACHING
DESCRIPTORS: ACHING

## 2023-09-03 NOTE — CONSULTS
injection of Spot (carbon black) two folds distal. See             images 24 and 25 with arrow showing polyp relative to the Tattoo site. The             biopsy was not done to not interfere with EMR in the future. -  One diminutive polyp in the transverse colon, removed with a cold biopsy             forceps. Resected and retrieved. -  Two 7 mm polyps in the descending colon, removed with a cold snare. Resected and retrieved. -  The examination was otherwise normal on direct and retroflexion views. Recommendation:          -  Refer to a gastroenterologist at appointment to be scheduled. -  Patient has a contact number available for emergencies. The signs and             symptoms of potential delayed complications were discussed with the patient. Return to normal activities tomorrow. Written discharge instructions were             provided to the patient.          -  [facility] to [facility]. -  Await pathology results. -  Telephone GI clinic for pathology results in 1 week. -  The findings and recommendations were discussed with the patient's family. -  The findings and recommendations were discussed with the patient. EGD   Impression:          -  Normal esophagus.          -  Normal stomach. Biopsied.          -  Erythematous duodenopathy.          -  Normal second portion of the duodenum. Final Pathologic Diagnosis:   A. Stomach, biopsy:   -     Helicobacter-associated mild chronic active gastritis. B. Colon, 7 mm ascending colon polyps x 2:   -     Tubular adenomas. C. Colon, 2 mm transverse colon polyp:   -     Colonic mucosa with no significant pathologic changes. 8/8/23: MRI TS: IMPRESSION:  Subacute compression fracture at the superior endplate of V97 with abnormal  bone marrow signal and 40% height loss anteriorly.   This compression fracture  is new support as needed. Also discussed bone modifying agents, he is not very sure about that. 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 Underwent right lobectomy with mediastinal lymph node dissection on 5/21/2018 final pathologic stage T2a N0. Looks like he did not undergo any further adjuvant treatment. Macrocytic anemia could be secondary to multiple myeloma versus intermittent GI bleed. EGD and colonoscopy findings noted. Has been referred to tertiary center for possible intervention for the colon polyp. H. pylori gastritis. R/O hemolysis and nutritional def, ferritin and b12 pending. Recommend GI follow-up     AAA PAD, recommend that he follows with the primary care physician/cardiology. Looks like new onset A-fib, recommendations per cardiology. COPD/pneumonia, he is on breathing treatments, supplemental oxygen and empiric antibiotics. Will discuss with the team.    Continue other medical care    ECOG Performance Status (ECOG Scale 0-4): 2-3     This plan was discussed with the patient and his son and  They seem to have verbalized understanding. We will continue to follow the patient. Thank you for allowing us to participate in the care of this patient.      Eveline

## 2023-09-03 NOTE — PROGRESS NOTES
V2.0  [facility] [place], [place] Hospitalist Progress Note      Name:  Herminia Zeng /Age/Sex:   (74 y.o. male)   MRN & CSN:  [id number] & [id number] Encounter Date/Time: 9/3/2023 12:55 PM EDT    Location:  [address] [place] [address]S PCP: No primary care provider on file. Hospital Day: 2    Assessment and Plan:   Herminia Zeng is a 74 y.o. male with pmh of recent Dx of MM, getting eval for large colon polyp that needs t be taken care of at [facility] who presents with Pneumonia, unspecified organism      Plan:  Herminia Zeng is a 74 y.o. male who presents with Pneumonia, unspecified organism     Hospital Problems               Last Modified POA     * (Principal) Pneumonia, unspecified organism 2023 Yes         Plan:     Severe sepsis 2/2 pneumonia  Acute hypoxic respiratory failure 2/2 pneumonia  History of COPD  History of lung cancer -s/p resection, no systemic treatment  -Pt recently discharged from hospital.  Presents with SOB, hypoxic on arrival requiring 3 L oxygen (no baseline oxygen requirement). -Procalcitonin 2.23, Lactic acid 2.7 (normalized). Rapid flu and COVID-negative.   -CTA chest/abdomen/pelvis done in ED shows moderate right lower lobe airspace disease concerning for pneumonia, right lobectomy  -S/p sepsis fluids, continue maintenance IVF  -Initiated vancomycin/cefepime  -Follow infectious/inflammatory work-up     DORCAS on CKD stage III -likely pre-renal in setting of poor PO intake  -Creatinine 2.6 from new baseline 1.7-1.9 on admission  -Bladder scan, strict I/Os, UA  -IVF hydration  -Nephrology consulted, appreciate recs     New onset Afib  -Noted on EKG, repeat, maintain telemetry  -Will not anticoagulate as very recent hx of concern for GIB (2023)  -Cardiology was consulted who recommends to stop amlodipine, increases metoprolo but [facility]     Elevated troponin likely demand ischemia  -Patient without chest pain  -Troponin 0.012, EKG without ST elevation/depression  -Trend troponin, maintain DOSE AMOUNT DOSE AMT. GIVEN - NONE     DOSE TIME DOSE TIME GIVEN - NONE    Lactic Acid    Collection Time: 09/03/23  7:41 AM   Result Value Ref Range    Lactate 1.1 0.5 - 1.9 mMOL/L   Troponin    Collection Time: 09/03/23  7:41 AM   Result Value Ref Range    Troponin T <0.010 <0.01 NG/ML   Basic Metabolic Panel    Collection Time: 09/03/23  7:41 AM   Result Value Ref Range    Sodium 130 (L) 135 - 145 MMOL/L    Potassium 4.1 3.5 - 5.1 MMOL/L    Chloride 97 (L) 99 - 110 mMol/L    CO2 19 (L) 21 - 32 MMOL/L    Anion Gap 14 4 - 16    BUN 59 (H) 6 - 23 MG/DL    Creatinine 2.3 (H) 0.9 - 1.3 MG/DL    Est, Glom Filt Rate 29 (L) >60 mL/min/1.73m2    Glucose 252 (H) 70 - 99 MG/DL    Calcium 9.2 8.3 - 10.6 MG/DL   Magnesium    Collection Time: 09/03/23  7:41 AM   Result Value Ref Range    Magnesium 1.9 1.8 - 2.4 mg/dl   SPECIMEN REJECTION    Collection Time: 09/03/23  7:41 AM   Result Value Ref Range    Rejected Test CMPX CRPIN     Reason for Rejection RHEMO    POCT Glucose    Collection Time: 09/03/23 11:39 AM   Result Value Ref Range    POC Glucose 242 (H) 70 - 99 MG/DL        Imaging/Diagnostics Last 24 Hours   CTA CHEST ABDOMEN PELVIS W CONTRAST    Result Date: 9/3/2023  EXAMINATION: CTA OF THE CHEST, ABDOMEN AND PELVIS WITH CONTRAST 9/2/2023 11:54 am: TECHNIQUE: CTA of the chest, abdomen and pelvis was performed after the administration of intravenous contrast.  Multiplanar reformatted images are provided for review. MIP images are provided for review. Automated exposure control, iterative reconstruction, and/or weight based adjustment of the mA/kV was utilized to reduce the radiation dose to as low as reasonably achievable. COMPARISON: 08/10/2023 HISTORY: ORDERING SYSTEM PROVIDED HISTORY: Hypoxia, tachycardia, history of multiple myeloma, rule out PE. Left-sided abdominal pain, nausea. TECHNOLOGIST PROVIDED HISTORY: Reason for exam:->Hypoxia, tachycardia, history of multiple myeloma, rule out PE.   Left-sided abdominal

## 2023-09-03 NOTE — CONSULTS
Nephrology Service Consultation      [address], [facility], [address] [place] [address]  Phone: [phone]  Office Hours: 8:30AM - 4:30PM  Monday - Friday        MEDICAL DECISION MAKING and Recommendations   -DORCAS: cr 2.6 from 1.9 at recent dc/ likely from volume depletion//no HN on CT A/P  -CKD3  -Hypercalcemia  -HTN hx  -Chronic hyperkalemia: lokelma  -RML pneumonia  -Abdominal pain  -Multiple myeloma and L1 lytic lesion: untreated  -AAA: 4cm    Suggest:  -The multiple myeloma is not going to get treated anytime soon considering that he now has an infection, which means the hypercalcemia will recur and will likely be worse next time, so give a dose of denosumab for long term mgmt  -Continue IVF  -Resume daily lokelma  -Avoid nephrotoxins    Thank you    Patient Active Problem List    Diagnosis Date Noted    Pneumonia, unspecified organism 09/02/2023    Need for hepatitis B screening test 08/28/2023    Hyperkalemia 08/25/2023    Iron deficiency anemia 08/09/2023    Anemia due to GI blood loss 08/09/2023    Severe malnutrition ([address]) 08/09/2023    Macrocytic anemia 08/07/2023    Multiple myeloma ([address]) 08/07/2023    Ureteral calculus, left 05/09/2017    Shoulder pain, left 12/15/2013    Neck pain on left side 12/15/2013    Essential hypertension, benign 12/15/2013    Other and unspecified hyperlipidemia 12/15/2013    Coronary atherosclerosis 12/15/2013    Type II or unspecified type diabetes mellitus without mention of complication, not stated as uncontrolled 12/15/2013    Smoker 12/15/2013    Angina pectoris ([address]) 12/14/2013         Patient:  Seema Tran  MRN: [id number]  Consulting physician:  Alley Logan MD  Reason for Consult: pt previously seen by me  PCP: No primary care provider on file. HISTORY OF PRESENT ILLNESS:   The patient is a 74 y.o. male with multiple myeloma, not yet treated, chronic hyperkalemia and on lokelma, presented with abdominal pain.  CTA abdo and chest showed right LLL and affect can not be assessed     CBC:   Recent Labs     09/02/23  1105   WBC 6.9   HGB 8.8*        BMP:    Recent Labs     09/02/23  1105      K 4.1   CL 95*   CO2 21   BUN 63*   CREATININE 2.6*   GLUCOSE 257*     Hepatic:   Recent Labs     09/02/23  1105   AST 14*   ALT 8*   BILITOT 0.5   ALKPHOS 110              Electronically signed by Bill Garcia DO on 9/3/2023 at MD Nilo Aguilera DO  [address] [place] [address], [address] [place] [address]  PHONE: [phone]  FAX: [phone]

## 2023-09-03 NOTE — CONSULTS
CARDIOLOGY CONSULT NOTE   Reason for consultation: Elevated troponin      Primary care physician: No primary care provider on file. Chief Complaints :  Chief Complaint   Patient presents with    Abdominal Pain        History of present illness:Nate is a 74 y. o.year old male who is admitted with abdominal pain and is noted to have pneumonia. I am asked to see him for elevated troponin. Upon my evaluation patient states that he does not have any chest discomfort. He does have mild abdominal discomfort. He was recently diagnosed with multiple myeloma but has not started any therapy. He also has prior history of abdominal aortic aneurysm. He denies any significant cardiac history. Review of Systems:     All systems negative except as marked. Physical Examination:    Vitals:    09/03/23 1209   BP:    Pulse:    Resp:    Temp:    SpO2: 95%        General Appearance:  No distress, conversant    Constitutional:  No acute distress, non-toxic appearance. HENT:  Normocephalic, Atraumatic,   Eyes:  PERRL, EOMI, Conjunctiva normal, No discharge. Respiratory:  No respiratory distress, No wheezing  Cardiovascular: S1, S2, no murmurs, gallops. JVD wnl  Abdomen /GI:   Soft, No tenderness   Genitourinary: No costovertebral angle tenderness   Musculoskeletal:  No edema, no tenderness, no deformities.    Integument:  Well hydrated, no rash   Neurologic:  Alert & oriented x 3    Medical decision making and Data review:    Lab Review   Recent Labs     09/03/23  0741   WBC 4.9   HGB 7.5*   HCT 26.3*         Recent Labs     09/03/23  0741   *   K 4.1   CL 97*   CO2 19*   BUN 59*   CREATININE 2.3*     Recent Labs     09/02/23  1105   AST 14*   ALT 8*   BILIDIR 0.2   BILITOT 0.5   ALKPHOS 110     Recent Labs     09/02/23  1116 09/03/23  0741   TROPONINT 0.012* <0.010       Recent Labs     09/02/23  1105   PROBNP 4,113*     Lab Results   Component Value Date    INR 1.3 08/08/2023    PROTIME 15.9 (H) 08/08/2023       EKG: (reviewed by myself): His EKG showed atrial fibrillation with a left ventricular hypertrophy and prolonged QTc along with nonspecific ST-T changes. ECHO:(reviewed by myself) there is no echocardiogram on file. His CT abdomen pelvis was reviewed. It did show possible right lower lobe pneumonia. All labs, medications and tests reviewed by myself including data  from outside source , patient and available family . Continue all other medications of all above medical condition listed as is. Impression and Recommendations:    Elevated troponin  New onset atrial fibrillation  Acute on chronic kidney injury  Severe sepsis      74 y. o.year old with above medical history. His troponin is minimally elevated. This is likely in the setting of acute kidney injury. I do not suspect acute coronary syndrome at this point. He does have new onset atrial fibrillation. His [id number] score is 2 based on his age and history of hypertension. At the same time he is recently diagnosed with multiple myeloma and he is already moderately anemic I will therefore hold off on systemic anticoagulation for now and continue with aspirin and atorvastatin. I will increase his metoprolol dose to 50 mg twice a day and stop his amlodipine to get more blood pressure room for metoprolol. We will obtain an echocardiogram as well. We will follow-up. Thank you  much for consult and giving us the opportunity in contributing in the care of this patient. Please feel free to call me for any questions.        Xander Soriano MD, 9/3/2023 12:26 PM

## 2023-09-04 PROBLEM — R79.89 ELEVATED TROPONIN: Status: ACTIVE | Noted: 2023-09-04

## 2023-09-04 PROBLEM — R77.8 ELEVATED TROPONIN: Status: ACTIVE | Noted: 2023-09-04

## 2023-09-04 LAB
ANION GAP SERPL CALCULATED.3IONS-SCNC: 14 MMOL/L (ref 4–16)
ANION GAP SERPL CALCULATED.3IONS-SCNC: 21 MMOL/L (ref 4–16)
APTT: 41 SECONDS (ref 25.1–37.1)
BANDED NEUTROPHILS ABSOLUTE COUNT: 0.29 K/CU MM
BANDED NEUTROPHILS RELATIVE PERCENT: 11 % (ref 5–11)
BUN SERPL-MCNC: 57 MG/DL (ref 6–23)
BUN SERPL-MCNC: 59 MG/DL (ref 6–23)
CALCIUM SERPL-MCNC: 9.2 MG/DL (ref 8.3–10.6)
CALCIUM SERPL-MCNC: 9.3 MG/DL (ref 8.3–10.6)
CHLORIDE BLD-SCNC: 109 MMOL/L (ref 99–110)
CHLORIDE BLD-SCNC: 97 MMOL/L (ref 99–110)
CO2: 18 MMOL/L (ref 21–32)
CO2: 19 MMOL/L (ref 21–32)
CREAT SERPL-MCNC: 2.3 MG/DL (ref 0.9–1.3)
CREAT SERPL-MCNC: 2.4 MG/DL (ref 0.9–1.3)
DIFFERENTIAL TYPE: ABNORMAL
DOSE AMOUNT: NORMAL
DOSE TIME: NORMAL
EOSINOPHILS ABSOLUTE: 0.2 K/CU MM
EOSINOPHILS RELATIVE PERCENT: 6 % (ref 0–3)
FIBRINOGEN LEVEL: 525 MG/DL (ref 170–540)
GFR SERPL CREATININE-BSD FRML MDRD: 28 ML/MIN/1.73M2
GFR SERPL CREATININE-BSD FRML MDRD: 29 ML/MIN/1.73M2
GLUCOSE BLD-MCNC: 217 MG/DL (ref 70–99)
GLUCOSE BLD-MCNC: 238 MG/DL (ref 70–99)
GLUCOSE BLD-MCNC: 247 MG/DL (ref 70–99)
GLUCOSE BLD-MCNC: 287 MG/DL (ref 70–99)
GLUCOSE SERPL-MCNC: 201 MG/DL (ref 70–99)
GLUCOSE SERPL-MCNC: 252 MG/DL (ref 70–99)
HCT VFR BLD CALC: 25.7 % (ref 42–52)
HEMOGLOBIN: 7.4 GM/DL (ref 13.5–18)
INR BLD: 1.3 INDEX
LYMPHOCYTES ABSOLUTE: 0.4 K/CU MM
LYMPHOCYTES RELATIVE PERCENT: 15 % (ref 24–44)
MAGNESIUM: 1.8 MG/DL (ref 1.8–2.4)
MCH RBC QN AUTO: 32.2 PG (ref 27–31)
MCHC RBC AUTO-ENTMCNC: 28.8 % (ref 32–36)
MCV RBC AUTO: 111.7 FL (ref 78–100)
MONOCYTES ABSOLUTE: 0.1 K/CU MM
MONOCYTES RELATIVE PERCENT: 2 % (ref 0–4)
MRSA, DNA, NASAL: NEGATIVE
PDW BLD-RTO: 17 % (ref 11.7–14.9)
PLATELET # BLD: 214 K/CU MM (ref 140–440)
PMV BLD AUTO: 10.2 FL (ref 7.5–11.1)
POTASSIUM SERPL-SCNC: 4.1 MMOL/L (ref 3.5–5.1)
POTASSIUM SERPL-SCNC: 4.2 MMOL/L (ref 3.5–5.1)
PROTHROMBIN TIME: 16 SECONDS (ref 11.7–14.5)
RBC # BLD: 2.3 M/CU MM (ref 4.6–6.2)
RETICULOCYTE COUNT PCT: 2.8 % (ref 0.2–2.2)
SEGMENTED NEUTROPHILS ABSOLUTE COUNT: 1.6 K/CU MM
SEGMENTED NEUTROPHILS RELATIVE PERCENT: 66 % (ref 36–66)
SODIUM BLD-SCNC: 137 MMOL/L
SODIUM BLD-SCNC: 141 MMOL/L (ref 135–145)
SPECIMEN DESCRIPTION: NORMAL
VANCOMYCIN RANDOM: 20.5 UG/ML
VITAMIN B-12: >2000 PG/ML (ref 211–911)
WBC # BLD: 2.6 K/CU MM (ref 4–10.5)

## 2023-09-04 PROCEDURE — 6370000000 HC RX 637 (ALT 250 FOR IP): Performed by: INTERNAL MEDICINE

## 2023-09-04 PROCEDURE — 85384 FIBRINOGEN ACTIVITY: CPT

## 2023-09-04 PROCEDURE — 2700000000 HC OXYGEN THERAPY PER DAY

## 2023-09-04 PROCEDURE — 83735 ASSAY OF MAGNESIUM: CPT

## 2023-09-04 PROCEDURE — 87641 MR-STAPH DNA AMP PROBE: CPT

## 2023-09-04 PROCEDURE — 6370000000 HC RX 637 (ALT 250 FOR IP)

## 2023-09-04 PROCEDURE — 99233 SBSQ HOSP IP/OBS HIGH 50: CPT | Performed by: INTERNAL MEDICINE

## 2023-09-04 PROCEDURE — 87081 CULTURE SCREEN ONLY: CPT

## 2023-09-04 PROCEDURE — 82607 VITAMIN B-12: CPT

## 2023-09-04 PROCEDURE — 6370000000 HC RX 637 (ALT 250 FOR IP): Performed by: STUDENT IN AN ORGANIZED HEALTH CARE EDUCATION/TRAINING PROGRAM

## 2023-09-04 PROCEDURE — 85730 THROMBOPLASTIN TIME PARTIAL: CPT

## 2023-09-04 PROCEDURE — 82962 GLUCOSE BLOOD TEST: CPT

## 2023-09-04 PROCEDURE — 85610 PROTHROMBIN TIME: CPT

## 2023-09-04 PROCEDURE — 2060000000 HC ICU INTERMEDIATE R&B

## 2023-09-04 PROCEDURE — 6360000002 HC RX W HCPCS: Performed by: STUDENT IN AN ORGANIZED HEALTH CARE EDUCATION/TRAINING PROGRAM

## 2023-09-04 PROCEDURE — 2580000003 HC RX 258: Performed by: STUDENT IN AN ORGANIZED HEALTH CARE EDUCATION/TRAINING PROGRAM

## 2023-09-04 PROCEDURE — 36415 COLL VENOUS BLD VENIPUNCTURE: CPT

## 2023-09-04 PROCEDURE — 94761 N-INVAS EAR/PLS OXIMETRY MLT: CPT

## 2023-09-04 PROCEDURE — 94664 DEMO&/EVAL PT USE INHALER: CPT

## 2023-09-04 PROCEDURE — 80048 BASIC METABOLIC PNL TOTAL CA: CPT

## 2023-09-04 PROCEDURE — APPSS30 APP SPLIT SHARED TIME 16-30 MINUTES

## 2023-09-04 PROCEDURE — 94640 AIRWAY INHALATION TREATMENT: CPT

## 2023-09-04 PROCEDURE — 80202 ASSAY OF VANCOMYCIN: CPT

## 2023-09-04 PROCEDURE — 83010 ASSAY OF HAPTOGLOBIN QUANT: CPT

## 2023-09-04 PROCEDURE — 85027 COMPLETE CBC AUTOMATED: CPT

## 2023-09-04 PROCEDURE — 85045 AUTOMATED RETICULOCYTE COUNT: CPT

## 2023-09-04 PROCEDURE — 85007 BL SMEAR W/DIFF WBC COUNT: CPT

## 2023-09-04 RX ORDER — MEGESTROL ACETATE 40 MG/ML
200 SUSPENSION ORAL DAILY
Status: DISCONTINUED | OUTPATIENT
Start: 2023-09-04 | End: 2023-09-09 | Stop reason: HOSPADM

## 2023-09-04 RX ORDER — SODIUM BICARBONATE 650 MG/1
1300 TABLET ORAL 3 TIMES DAILY
Status: COMPLETED | OUTPATIENT
Start: 2023-09-04 | End: 2023-09-05

## 2023-09-04 RX ADMIN — SODIUM BICARBONATE 1300 MG: 650 TABLET ORAL at 15:20

## 2023-09-04 RX ADMIN — SODIUM BICARBONATE 1300 MG: 650 TABLET ORAL at 21:15

## 2023-09-04 RX ADMIN — FOLIC ACID 1 MG: 1 TABLET ORAL at 08:33

## 2023-09-04 RX ADMIN — INSULIN LISPRO 2 UNITS: 100 INJECTION, SOLUTION INTRAVENOUS; SUBCUTANEOUS at 08:34

## 2023-09-04 RX ADMIN — INSULIN GLARGINE 8 UNITS: 100 INJECTION, SOLUTION SUBCUTANEOUS at 08:34

## 2023-09-04 RX ADMIN — DILTIAZEM HYDROCHLORIDE 30 MG: 30 TABLET, FILM COATED ORAL at 11:48

## 2023-09-04 RX ADMIN — SODIUM CHLORIDE, PRESERVATIVE FREE 10 ML: 5 INJECTION INTRAVENOUS at 21:19

## 2023-09-04 RX ADMIN — DILTIAZEM HYDROCHLORIDE 30 MG: 30 TABLET, FILM COATED ORAL at 17:22

## 2023-09-04 RX ADMIN — GUAIFENESIN 600 MG: 600 TABLET, EXTENDED RELEASE ORAL at 08:35

## 2023-09-04 RX ADMIN — SODIUM ZIRCONIUM CYCLOSILICATE 10 G: 10 POWDER, FOR SUSPENSION ORAL at 08:32

## 2023-09-04 RX ADMIN — IPRATROPIUM BROMIDE AND ALBUTEROL SULFATE 1 DOSE: 2.5; .5 SOLUTION RESPIRATORY (INHALATION) at 08:22

## 2023-09-04 RX ADMIN — PANTOPRAZOLE SODIUM 40 MG: 40 TABLET, DELAYED RELEASE ORAL at 05:28

## 2023-09-04 RX ADMIN — METOPROLOL TARTRATE 50 MG: 50 TABLET, FILM COATED ORAL at 08:33

## 2023-09-04 RX ADMIN — INSULIN LISPRO 4 UNITS: 100 INJECTION, SOLUTION INTRAVENOUS; SUBCUTANEOUS at 11:48

## 2023-09-04 RX ADMIN — VANCOMYCIN HYDROCHLORIDE 1000 MG: 1 INJECTION, POWDER, LYOPHILIZED, FOR SOLUTION INTRAVENOUS at 11:54

## 2023-09-04 RX ADMIN — SODIUM CHLORIDE, PRESERVATIVE FREE 10 ML: 5 INJECTION INTRAVENOUS at 08:34

## 2023-09-04 RX ADMIN — DILTIAZEM HYDROCHLORIDE 30 MG: 30 TABLET, FILM COATED ORAL at 23:52

## 2023-09-04 RX ADMIN — HYDROMORPHONE HYDROCHLORIDE 0.5 MG: 1 INJECTION, SOLUTION INTRAMUSCULAR; INTRAVENOUS; SUBCUTANEOUS at 05:28

## 2023-09-04 RX ADMIN — GUAIFENESIN 600 MG: 600 TABLET, EXTENDED RELEASE ORAL at 21:15

## 2023-09-04 RX ADMIN — ALLOPURINOL 300 MG: 100 TABLET ORAL at 08:33

## 2023-09-04 RX ADMIN — IPRATROPIUM BROMIDE AND ALBUTEROL SULFATE 1 DOSE: 2.5; .5 SOLUTION RESPIRATORY (INHALATION) at 12:16

## 2023-09-04 RX ADMIN — FERROUS GLUCONATE 324 MG: 324 TABLET ORAL at 08:58

## 2023-09-04 RX ADMIN — ASPIRIN 81 MG CHEWABLE TABLET 81 MG: 81 TABLET CHEWABLE at 08:33

## 2023-09-04 RX ADMIN — HYDROMORPHONE HYDROCHLORIDE 0.5 MG: 1 INJECTION, SOLUTION INTRAMUSCULAR; INTRAVENOUS; SUBCUTANEOUS at 21:20

## 2023-09-04 RX ADMIN — SODIUM BICARBONATE 1300 MG: 650 TABLET ORAL at 08:33

## 2023-09-04 RX ADMIN — CEFEPIME 1000 MG: 1 INJECTION, POWDER, FOR SOLUTION INTRAMUSCULAR; INTRAVENOUS at 21:25

## 2023-09-04 RX ADMIN — INSULIN LISPRO 2 UNITS: 100 INJECTION, SOLUTION INTRAVENOUS; SUBCUTANEOUS at 16:09

## 2023-09-04 RX ADMIN — METOPROLOL TARTRATE 50 MG: 50 TABLET, FILM COATED ORAL at 21:18

## 2023-09-04 RX ADMIN — ATORVASTATIN CALCIUM 40 MG: 40 TABLET, FILM COATED ORAL at 21:15

## 2023-09-04 RX ADMIN — IPRATROPIUM BROMIDE AND ALBUTEROL SULFATE 1 DOSE: 2.5; .5 SOLUTION RESPIRATORY (INHALATION) at 20:07

## 2023-09-04 RX ADMIN — CEFEPIME 1000 MG: 1 INJECTION, POWDER, FOR SOLUTION INTRAMUSCULAR; INTRAVENOUS at 08:44

## 2023-09-04 RX ADMIN — ENOXAPARIN SODIUM 30 MG: 100 INJECTION SUBCUTANEOUS at 08:33

## 2023-09-04 ASSESSMENT — PAIN DESCRIPTION - ORIENTATION
ORIENTATION: RIGHT
ORIENTATION: RIGHT
ORIENTATION: RIGHT;LEFT;MID;LOWER
ORIENTATION: RIGHT;LEFT;LOWER

## 2023-09-04 ASSESSMENT — PAIN DESCRIPTION - DESCRIPTORS
DESCRIPTORS: ACHING

## 2023-09-04 ASSESSMENT — PAIN DESCRIPTION - FREQUENCY: FREQUENCY: CONTINUOUS

## 2023-09-04 ASSESSMENT — PAIN DESCRIPTION - LOCATION
LOCATION: CHEST
LOCATION: SHOULDER
LOCATION: SHOULDER
LOCATION: SHOULDER;RIB CAGE

## 2023-09-04 ASSESSMENT — PAIN DESCRIPTION - PAIN TYPE
TYPE: CHRONIC PAIN
TYPE: CHRONIC PAIN

## 2023-09-04 ASSESSMENT — PAIN DESCRIPTION - ONSET: ONSET: ON-GOING

## 2023-09-04 ASSESSMENT — PAIN SCALES - GENERAL
PAINLEVEL_OUTOF10: 7
PAINLEVEL_OUTOF10: 8
PAINLEVEL_OUTOF10: 8

## 2023-09-04 ASSESSMENT — PAIN - FUNCTIONAL ASSESSMENT
PAIN_FUNCTIONAL_ASSESSMENT: ACTIVITIES ARE NOT PREVENTED
PAIN_FUNCTIONAL_ASSESSMENT: PREVENTS OR INTERFERES SOME ACTIVE ACTIVITIES AND ADLS

## 2023-09-04 NOTE — PROGRESS NOTES
Comprehensive Nutrition Assessment    Type and Reason for Visit:  Initial, Consult (Poor intake/appetite 5 or more days)    Nutrition Recommendations/Plan:   Continue current diet  Continue diabetic oral nutrition supplement TID  Please encourage and document all po intakes  Monitor weights, intakes, labs, POC     Malnutrition Assessment:  Malnutrition Status:  Severe malnutrition (09/04/23 1508)    Context:  Chronic Illness     Findings of the 6 clinical characteristics of malnutrition:  Energy Intake:  75% or less estimated energy requirements for 1 month or longer  Weight Loss:  Greater than 7.5% over 3 months (15% x4mo)     Body Fat Loss:  Severe body fat loss Triceps, Orbital, Buccal region   Muscle Mass Loss:  Severe muscle mass loss Clavicles (pectoralis & deltoids), Thigh (quadraceps), Temples (temporalis)  Fluid Accumulation:  No significant fluid accumulation     Strength:  Not Performed    Nutrition Assessment:    Admitted w/ PNA, severe sepsis, DORCAS, h/o lung Ca, DM2, hyperkalemia, myeloma, smoker, malnutrition. Pt known to dept w/ recent admit for hyperkalemia. Pt states he hasn't been eating much, just small portions during the day because \"I have to because of my diabetes\"; reports he ate some breakfast, but did not have any lunch today. Documented po intakes 1-25%. Did note that he drank two Glucerna, will continue sending TID. He states he had an appt w/ a RD at the [facility] to help him get ONS at home, but did not make it to his appt. Noted sig wt loss of 15% of 4mo; NFPE performed, severe wasting noted, continues to meet criteria for malnutrition. Follow at high nutrition risk. Nutrition Related Findings:    +lokelma, B12; glucose 201-287; hgb 7.4, GFR 28 Wound Type: None       Current Nutrition Intake & Therapies:    Average Meal Intake: 1-25% (per flowsheets)  Average Supplements Intake: % (per pt)  ADULT DIET;  Regular; 5 carb choices (75 gm/meal)  ADULT ORAL NUTRITION SUPPLEMENT; Physical Findings, Biochemical Data, Meal Time Behavior    Discharge Planning:    Continue current diet, Continue Oral Nutrition Supplement     Ilan Méndez, RD  Contact: [phone]

## 2023-09-04 NOTE — PLAN OF CARE
Problem: Discharge Planning  Goal: Discharge to home or other facility with appropriate resources  Outcome: Progressing  Flowsheets (Taken 9/3/2023 1203 by Parth Gonzalez, RN)  Discharge to home or other facility with appropriate resources:   Identify barriers to discharge with patient and caregiver   Arrange for needed discharge resources and transportation as appropriate   Identify discharge learning needs (meds, wound care, etc)   Arrange for interpreters to assist at discharge as needed   Refer to discharge planning if patient needs post-hospital services based on physician order or complex needs related to functional status, cognitive ability or social support system     Problem: Chronic Conditions and Co-morbidities  Goal: Patient's chronic conditions and co-morbidity symptoms are monitored and maintained or improved  Outcome: Progressing  Flowsheets (Taken 9/3/2023 1203 by Parth Gonzalez, RN)  Care Plan - Patient's Chronic Conditions and Co-Morbidity Symptoms are Monitored and Maintained or Improved:   Monitor and assess patient's chronic conditions and comorbid symptoms for stability, deterioration, or improvement   Collaborate with multidisciplinary team to address chronic and comorbid conditions and prevent exacerbation or deterioration   Update acute care plan with appropriate goals if chronic or comorbid symptoms are exacerbated and prevent overall improvement and discharge     Problem: Pain  Goal: Verbalizes/displays adequate comfort level or baseline comfort level  Outcome: Progressing     Problem: Safety - Adult  Goal: Free from fall injury  Outcome: Progressing

## 2023-09-04 NOTE — PROGRESS NOTES
I noted that the tech had patient up to the bathroom. I suggested that due to patient being very unsteady, she use a gait belt for patient safety. I went and gave her one.

## 2023-09-04 NOTE — PROGRESS NOTES
1. 3 INDEX    aPTT 41.0 (H) 25.1 - 37.1 SECONDS   Reticulocytes    Collection Time: 09/04/23  3:08 AM   Result Value Ref Range    Retic Ct Pct 2.8 (H) 0.2 - 2.20 %   Vitamin B12    Collection Time: 09/04/23  3:08 AM   Result Value Ref Range    Vitamin B-12 >2000 (H) 211 - 911 pg/ml   POCT Glucose    Collection Time: 09/04/23  8:29 AM   Result Value Ref Range    POC Glucose 247 (H) 70 - 99 MG/DL   POCT Glucose    Collection Time: 09/04/23 11:44 AM   Result Value Ref Range    POC Glucose 287 (H) 70 - 99 MG/DL        Imaging/Diagnostics Last 24 Hours   CTA CHEST ABDOMEN PELVIS W CONTRAST    Result Date: 9/3/2023  EXAMINATION: CTA OF THE CHEST, ABDOMEN AND PELVIS WITH CONTRAST 9/2/2023 11:54 am: TECHNIQUE: CTA of the chest, abdomen and pelvis was performed after the administration of intravenous contrast.  Multiplanar reformatted images are provided for review. MIP images are provided for review. Automated exposure control, iterative reconstruction, and/or weight based adjustment of the mA/kV was utilized to reduce the radiation dose to as low as reasonably achievable. COMPARISON: 08/10/2023 HISTORY: ORDERING SYSTEM PROVIDED HISTORY: Hypoxia, tachycardia, history of multiple myeloma, rule out PE. Left-sided abdominal pain, nausea. TECHNOLOGIST PROVIDED HISTORY: Reason for exam:->Hypoxia, tachycardia, history of multiple myeloma, rule out PE. Left-sided abdominal pain, nausea. Decision Support Exception - unselect if not a suspected or confirmed emergency medical condition->Emergency Medical Condition (MA) Reason for Exam: Hypoxia, tachycardia, history of multiple myeloma, rule out PE. Left-sided abdominal pain, nausea. FINDINGS: CTA CHEST: Thoracic aorta: The aorta is not well opacified with contrast. The pulmonary arteries are patent bilaterally. The main pulmonary arteries are mildly enlarged compatible with pulmonary hypertension. Mediastinum: No evidence of mediastinal lymphadenopathy.   The heart and pericardium demonstrate no acute abnormality. Lungs/Pleura: Moderate right lower lung airspace disease is identified compatible with pneumonia. Right lobectomy is noted. Moderate COPD is greater on the right. Soft Tissues/Bones: No acute bone or soft tissue abnormality. CTA ABDOMEN: Abdominal aorta/Branches: 4.0 x 3.3 cm infrarenal abdominal aortic aneurysm is identified. Organs: The contours of the liver, spleen, pancreas, adrenal glands and kidneys are intact. Gallbladder is unremarkable. GI/Bowel: The bowel is normal in caliber. No free air is identified. The appendix is normal. Peritoneum/Retroperitoneum: Bones/Soft Tissues: Small lucent lesion within the right side of the L3 vertebral body measures 1 cm and likely represents multiple myeloma lesion. Old T11 compression fracture is appreciated. CTA PELVIS: Aorta/Iliacs: Iliac arteries not well opacified with contrast.  Moderate atherosclerotic changes are identified. Other: No acute pelvic process is visualized. Bones/Soft Tissues: No acute osseous abnormality is detected. Moderate right lower lung airspace disease compatible with pneumonia. Right lobectomy. Moderate COPD greater on the right. Stable 4.0 x 3.3 cm infrarenal abdominal aortic aneurysm. Follow-up examination in 1 year is recommended. Vascular consultation is recommended. The thoracic and abdominal aorta are not opacified with contrast due to the phase of imaging. Mild pulmonary artery enlargement compatible with pulmonary hypertension. Stable 1 cm lucent lesion within the right side of the L3 vertebral body may represent small myeloma lesion. Old T11 compression fracture.  RECOMMENDATIONS:       Electronically signed by Diane Posey MD on 9/4/2023 at 3:09 PM

## 2023-09-05 LAB
ANION GAP SERPL CALCULATED.3IONS-SCNC: 14 MMOL/L (ref 4–16)
ANISOCYTOSIS: ABNORMAL
BANDED NEUTROPHILS ABSOLUTE COUNT: 0.52 K/CU MM
BANDED NEUTROPHILS RELATIVE PERCENT: 20 % (ref 5–11)
BASOPHILS ABSOLUTE: 0 K/CU MM
BASOPHILS RELATIVE PERCENT: 1 % (ref 0–1)
BUN SERPL-MCNC: 59 MG/DL (ref 6–23)
CALCIUM SERPL-MCNC: 9.4 MG/DL (ref 8.3–10.6)
CHLORIDE BLD-SCNC: 108 MMOL/L (ref 99–110)
CO2: 19 MMOL/L (ref 21–32)
CREAT SERPL-MCNC: 2.5 MG/DL (ref 0.9–1.3)
DIFFERENTIAL TYPE: ABNORMAL
EKG ATRIAL RATE: 129 BPM
EKG ATRIAL RATE: 138 BPM
EKG DIAGNOSIS: NORMAL
EKG DIAGNOSIS: NORMAL
EKG Q-T INTERVAL: 306 MS
EKG Q-T INTERVAL: 364 MS
EKG QRS DURATION: 84 MS
EKG QRS DURATION: 98 MS
EKG QTC CALCULATION (BAZETT): 446 MS
EKG QTC CALCULATION (BAZETT): 464 MS
EKG R AXIS: 49 DEGREES
EKG R AXIS: 71 DEGREES
EKG T AXIS: 220 DEGREES
EKG T AXIS: 247 DEGREES
EKG VENTRICULAR RATE: 128 BPM
EKG VENTRICULAR RATE: 98 BPM
EOSINOPHILS ABSOLUTE: 0.1 K/CU MM
EOSINOPHILS RELATIVE PERCENT: 4 % (ref 0–3)
GFR SERPL CREATININE-BSD FRML MDRD: 26 ML/MIN/1.73M2
GLUCOSE BLD-MCNC: 189 MG/DL (ref 70–99)
GLUCOSE BLD-MCNC: 246 MG/DL (ref 70–99)
GLUCOSE BLD-MCNC: 278 MG/DL (ref 70–99)
GLUCOSE BLD-MCNC: 321 MG/DL (ref 70–99)
GLUCOSE SERPL-MCNC: 162 MG/DL (ref 70–99)
HCT VFR BLD CALC: 30 % (ref 42–52)
HEMOGLOBIN: 8.1 GM/DL (ref 13.5–18)
LV EF: 58 %
LVEF MODALITY: NORMAL
LYMPHOCYTES ABSOLUTE: 0.6 K/CU MM
LYMPHOCYTES RELATIVE PERCENT: 24 % (ref 24–44)
MAGNESIUM: 2 MG/DL (ref 1.8–2.4)
MCH RBC QN AUTO: 32.4 PG (ref 27–31)
MCHC RBC AUTO-ENTMCNC: 27 % (ref 32–36)
MCV RBC AUTO: 120 FL (ref 78–100)
MONOCYTES ABSOLUTE: 0.3 K/CU MM
MONOCYTES RELATIVE PERCENT: 13 % (ref 0–4)
PDW BLD-RTO: 17.2 % (ref 11.7–14.9)
PLATELET # BLD: 197 K/CU MM (ref 140–440)
PMV BLD AUTO: 10.2 FL (ref 7.5–11.1)
POTASSIUM SERPL-SCNC: 3.7 MMOL/L (ref 3.5–5.1)
RBC # BLD: 2.5 M/CU MM (ref 4.6–6.2)
RBC # BLD: ABNORMAL 10*6/UL
SEGMENTED NEUTROPHILS ABSOLUTE COUNT: 1.1 K/CU MM
SEGMENTED NEUTROPHILS RELATIVE PERCENT: 38 % (ref 36–66)
SODIUM BLD-SCNC: 141 MMOL/L (ref 135–145)
TOXIC GRANULATION: PRESENT
WBC # BLD: 2.6 K/CU MM (ref 4–10.5)

## 2023-09-05 PROCEDURE — 85027 COMPLETE CBC AUTOMATED: CPT

## 2023-09-05 PROCEDURE — 93010 ELECTROCARDIOGRAM REPORT: CPT | Performed by: INTERNAL MEDICINE

## 2023-09-05 PROCEDURE — 6370000000 HC RX 637 (ALT 250 FOR IP): Performed by: STUDENT IN AN ORGANIZED HEALTH CARE EDUCATION/TRAINING PROGRAM

## 2023-09-05 PROCEDURE — 2700000000 HC OXYGEN THERAPY PER DAY

## 2023-09-05 PROCEDURE — 94640 AIRWAY INHALATION TREATMENT: CPT

## 2023-09-05 PROCEDURE — 94761 N-INVAS EAR/PLS OXIMETRY MLT: CPT

## 2023-09-05 PROCEDURE — APPSS30 APP SPLIT SHARED TIME 16-30 MINUTES

## 2023-09-05 PROCEDURE — 36415 COLL VENOUS BLD VENIPUNCTURE: CPT

## 2023-09-05 PROCEDURE — 6370000000 HC RX 637 (ALT 250 FOR IP): Performed by: INTERNAL MEDICINE

## 2023-09-05 PROCEDURE — 83735 ASSAY OF MAGNESIUM: CPT

## 2023-09-05 PROCEDURE — 99232 SBSQ HOSP IP/OBS MODERATE 35: CPT | Performed by: INTERNAL MEDICINE

## 2023-09-05 PROCEDURE — 99233 SBSQ HOSP IP/OBS HIGH 50: CPT | Performed by: INTERNAL MEDICINE

## 2023-09-05 PROCEDURE — 85007 BL SMEAR W/DIFF WBC COUNT: CPT

## 2023-09-05 PROCEDURE — 80048 BASIC METABOLIC PNL TOTAL CA: CPT

## 2023-09-05 PROCEDURE — 93306 TTE W/DOPPLER COMPLETE: CPT

## 2023-09-05 PROCEDURE — 82962 GLUCOSE BLOOD TEST: CPT

## 2023-09-05 PROCEDURE — 2580000003 HC RX 258: Performed by: STUDENT IN AN ORGANIZED HEALTH CARE EDUCATION/TRAINING PROGRAM

## 2023-09-05 PROCEDURE — 2060000000 HC ICU INTERMEDIATE R&B

## 2023-09-05 PROCEDURE — 6360000002 HC RX W HCPCS: Performed by: STUDENT IN AN ORGANIZED HEALTH CARE EDUCATION/TRAINING PROGRAM

## 2023-09-05 PROCEDURE — 6370000000 HC RX 637 (ALT 250 FOR IP)

## 2023-09-05 RX ORDER — METOPROLOL TARTRATE 50 MG/1
50 TABLET, FILM COATED ORAL 2 TIMES DAILY
Status: DISCONTINUED | OUTPATIENT
Start: 2023-09-05 | End: 2023-09-09 | Stop reason: HOSPADM

## 2023-09-05 RX ADMIN — SODIUM CHLORIDE, PRESERVATIVE FREE 10 ML: 5 INJECTION INTRAVENOUS at 09:23

## 2023-09-05 RX ADMIN — DILTIAZEM HYDROCHLORIDE 30 MG: 30 TABLET, FILM COATED ORAL at 13:27

## 2023-09-05 RX ADMIN — ENOXAPARIN SODIUM 30 MG: 100 INJECTION SUBCUTANEOUS at 09:24

## 2023-09-05 RX ADMIN — IPRATROPIUM BROMIDE AND ALBUTEROL SULFATE 1 DOSE: 2.5; .5 SOLUTION RESPIRATORY (INHALATION) at 07:56

## 2023-09-05 RX ADMIN — MEGESTROL ACETATE 200 MG: 40 SUSPENSION ORAL at 09:20

## 2023-09-05 RX ADMIN — CYANOCOBALAMIN TAB 1000 MCG 1000 MCG: 1000 TAB at 09:19

## 2023-09-05 RX ADMIN — SODIUM BICARBONATE 1300 MG: 650 TABLET ORAL at 13:27

## 2023-09-05 RX ADMIN — ALLOPURINOL 300 MG: 100 TABLET ORAL at 09:19

## 2023-09-05 RX ADMIN — SODIUM ZIRCONIUM CYCLOSILICATE 10 G: 10 POWDER, FOR SUSPENSION ORAL at 09:20

## 2023-09-05 RX ADMIN — SODIUM BICARBONATE 1300 MG: 650 TABLET ORAL at 09:20

## 2023-09-05 RX ADMIN — INSULIN GLARGINE 8 UNITS: 100 INJECTION, SOLUTION SUBCUTANEOUS at 09:23

## 2023-09-05 RX ADMIN — METOPROLOL TARTRATE 50 MG: 50 TABLET, FILM COATED ORAL at 13:27

## 2023-09-05 RX ADMIN — GUAIFENESIN 600 MG: 600 TABLET, EXTENDED RELEASE ORAL at 20:42

## 2023-09-05 RX ADMIN — ASPIRIN 81 MG CHEWABLE TABLET 81 MG: 81 TABLET CHEWABLE at 09:19

## 2023-09-05 RX ADMIN — CEFEPIME 1000 MG: 1 INJECTION, POWDER, FOR SOLUTION INTRAMUSCULAR; INTRAVENOUS at 09:37

## 2023-09-05 RX ADMIN — SODIUM CHLORIDE, PRESERVATIVE FREE 10 ML: 5 INJECTION INTRAVENOUS at 20:43

## 2023-09-05 RX ADMIN — IPRATROPIUM BROMIDE AND ALBUTEROL SULFATE 1 DOSE: 2.5; .5 SOLUTION RESPIRATORY (INHALATION) at 19:52

## 2023-09-05 RX ADMIN — FOLIC ACID 1 MG: 1 TABLET ORAL at 09:19

## 2023-09-05 RX ADMIN — INSULIN LISPRO 6 UNITS: 100 INJECTION, SOLUTION INTRAVENOUS; SUBCUTANEOUS at 13:27

## 2023-09-05 RX ADMIN — METOPROLOL TARTRATE 50 MG: 50 TABLET, FILM COATED ORAL at 20:42

## 2023-09-05 RX ADMIN — CEFEPIME 1000 MG: 1 INJECTION, POWDER, FOR SOLUTION INTRAMUSCULAR; INTRAVENOUS at 20:49

## 2023-09-05 RX ADMIN — IPRATROPIUM BROMIDE AND ALBUTEROL SULFATE 1 DOSE: 2.5; .5 SOLUTION RESPIRATORY (INHALATION) at 11:47

## 2023-09-05 RX ADMIN — PANTOPRAZOLE SODIUM 40 MG: 40 TABLET, DELAYED RELEASE ORAL at 05:43

## 2023-09-05 RX ADMIN — DILTIAZEM HYDROCHLORIDE 30 MG: 30 TABLET, FILM COATED ORAL at 23:48

## 2023-09-05 RX ADMIN — INSULIN LISPRO 4 UNITS: 100 INJECTION, SOLUTION INTRAVENOUS; SUBCUTANEOUS at 18:49

## 2023-09-05 RX ADMIN — SODIUM BICARBONATE 1300 MG: 650 TABLET ORAL at 20:42

## 2023-09-05 RX ADMIN — ATORVASTATIN CALCIUM 40 MG: 40 TABLET, FILM COATED ORAL at 20:42

## 2023-09-05 RX ADMIN — DILTIAZEM HYDROCHLORIDE 30 MG: 30 TABLET, FILM COATED ORAL at 18:49

## 2023-09-05 RX ADMIN — DILTIAZEM HYDROCHLORIDE 30 MG: 30 TABLET, FILM COATED ORAL at 05:42

## 2023-09-05 RX ADMIN — GUAIFENESIN 600 MG: 600 TABLET, EXTENDED RELEASE ORAL at 09:20

## 2023-09-05 RX ADMIN — FERROUS GLUCONATE 324 MG: 324 TABLET ORAL at 09:20

## 2023-09-05 NOTE — PROGRESS NOTES
Nephrology Progress Note        [address], [facility], [address] [place] [address]  Phone: [phone]  Office Hours: 8:30AM - 4:30PM  Monday - Friday 9/5/2023 7:31 AM  Subjective:   Admit Date: 9/2/2023  PCP: No primary care provider on file. Interval History:   Tachycardic  On NC today    Diet: ADULT DIET; Regular; 5 carb choices (75 gm/meal)  ADULT ORAL NUTRITION SUPPLEMENT; Dinner, Breakfast, Lunch; Diabetic Oral Supplement      Data:   Scheduled Meds:   sodium zirconium cyclosilicate  10 g Oral Daily    sodium bicarbonate  1,300 mg Oral TID    dilTIAZem  30 mg Oral 4 times per day    megestrol  200 mg Oral Daily    metoprolol tartrate  50 mg Oral BID    allopurinol  300 mg Oral Daily    aspirin  81 mg Oral Daily    atorvastatin  40 mg Oral Nightly    ferrous gluconate  324 mg Oral Daily with breakfast    folic acid  1 mg Oral Daily    insulin glargine  8 Units SubCUTAneous QAM    pantoprazole  40 mg Oral QAM AC    cyanocobalamin  1,000 mcg Oral Daily    sodium chloride flush  5-40 mL IntraVENous 2 times per day    enoxaparin  30 mg SubCUTAneous Daily    ipratropium 0.5 mg-albuterol 2.5 mg  1 Dose Inhalation Q4H WA RT    guaiFENesin  600 mg Oral BID    cefepime  1,000 mg IntraVENous Q12H    vancomycin (VANCOCIN) intermittent dosing (placeholder)   Other RX Placeholder    insulin lispro  0-8 Units SubCUTAneous TID WC    insulin lispro  0-4 Units SubCUTAneous Nightly     Continuous Infusions:   sodium chloride 25 mL (09/03/23 2159)    dextrose       PRN Meds:HYDROmorphone, sodium chloride flush, sodium chloride, ondansetron **OR** ondansetron, polyethylene glycol, acetaminophen **OR** acetaminophen, glucose, dextrose bolus **OR** dextrose bolus, glucagon (rDNA), dextrose  I/O last 3 completed shifts: In: 806.3 [P.O.:420; IV Piggyback:386.3]  Out: -   No intake/output data recorded.     Intake/Output Summary (Last 24 hours) at 9/5/2023 0731  Last data filed at 9/4/2023 1254  Gross per 24 hour pectoris ([address]) 12/14/2013     Cr up to 2.5 today  K wnl, continue lokelma  Calcium wnl  Control Afib, will also help renal perfusion  Avoid nephrotoxins                  Electronically signed by Brooklyn Allen DO on 9/5/2023 at 7:31 AM    [facility]  MD Breonna Llamas DO  [address] [place] [address]  PHONE: [phone]  FAX: [phone]

## 2023-09-05 NOTE — PLAN OF CARE
Problem: Discharge Planning  Goal: Discharge to home or other facility with appropriate resources  Outcome: Progressing  Flowsheets (Taken 9/5/2023 0800)  Discharge to home or other facility with appropriate resources:   Identify barriers to discharge with patient and caregiver   Arrange for needed discharge resources and transportation as appropriate   Identify discharge learning needs (meds, wound care, etc)   Refer to discharge planning if patient needs post-hospital services based on physician order or complex needs related to functional status, cognitive ability or social support system     Problem: Chronic Conditions and Co-morbidities  Goal: Patient's chronic conditions and co-morbidity symptoms are monitored and maintained or improved  9/5/2023 1144 by Julián Caballero RN  Outcome: Progressing  Flowsheets (Taken 9/5/2023 0800)  Care Plan - Patient's Chronic Conditions and Co-Morbidity Symptoms are Monitored and Maintained or Improved:   Monitor and assess patient's chronic conditions and comorbid symptoms for stability, deterioration, or improvement   Collaborate with multidisciplinary team to address chronic and comorbid conditions and prevent exacerbation or deterioration   Update acute care plan with appropriate goals if chronic or comorbid symptoms are exacerbated and prevent overall improvement and discharge  9/5/2023 0655 by Luis Wharton RN  Outcome: Progressing     Problem: Pain  Goal: Verbalizes/displays adequate comfort level or baseline comfort level  9/5/2023 1144 by Julián Caballero RN  Outcome: Progressing  Flowsheets (Taken 9/5/2023 0914)  Verbalizes/displays adequate comfort level or baseline comfort level:   Encourage patient to monitor pain and request assistance   Assess pain using appropriate pain scale   Administer analgesics based on type and severity of pain and evaluate response   Implement non-pharmacological measures as appropriate and evaluate response   Consider cultural and social influences on pain and pain management   Notify Licensed Independent Practitioner if interventions unsuccessful or patient reports new pain  9/5/2023 0655 by Aziza Torres RN  Outcome: Progressing     Problem: Safety - Adult  Goal: Free from fall injury  9/5/2023 1144 by Bertha Lane RN  Outcome: Progressing  9/5/2023 0655 by Aziza Torres RN  Outcome: Progressing     Problem: ABCDS Injury Assessment  Goal: Absence of physical injury  Outcome: Progressing

## 2023-09-05 NOTE — CARE COORDINATION
Received call from [address] [place] [address]. Patient's son Kalie Walton would like to see a CM. Stephanie Jones RN     1515 To 2E; no family present. Will reach out to family tomorrow.  Stephanie Jones RN

## 2023-09-05 NOTE — PROGRESS NOTES
V2.0  [facility] Hospitalist Progress Note      Name:  Diana Daniels /Age/Sex:   (74 y.o. male)   MRN & CSN:  [id number] & [id number] Encounter Date/Time: 2023 12:55 PM EDT    Location:  [address] [place] [address] PCP: No primary care provider on file. Hospital Day: 4    Assessment and Plan:   Diana Daniels is a 74 y.o. male with pmh of recent Dx of MM, getting eval for large colon polyp that needs t be taken care of at [facility] who presents with Pneumonia, unspecified organism      Plan:    Severe sepsis 2/2 pneumonia  Acute hypoxic respiratory failure 2/2 pneumonia  History of COPD  History of lung cancer -s/p resection, no systemic treatment  -Pt recently discharged from hospital.  Presents with SOB, hypoxic on arrival requiring 3 L oxygen (no baseline oxygen requirement). -Procalcitonin 2.23 -> 1.93,   -Lactic acid 2.7 (normalized).   Rapid flu and COVID-negative.  -Resp panel neg  -CTA chest/abdomen/pelvis done in ED shows moderate right lower lobe airspace disease concerning for pneumonia, right lobectomy  -S/p sepsis fluids, continue maintenance IVF  -Initiated vancomycin/cefepime, follow infectious/inflammatory work-up  -Bcx NTD    New onset Afib  -Noted on EKG, repeat, maintain telemetry  -Will not anticoagulate as very recent hx of concern for GIB (2023)  -Cardiology was consulted who recommends to stop amlodipine,change to PO cardizem 30 mg every 6 hrs  -High risk for oral AC given MM and recent GIB  Patient is high risk for oral anticoagulant given multiple myeloma and recent GI bleeding  Continue aspirin for now  -Echo   --120s today    Severe malnutrition:  Meets [place] criteria  Nutrition consult in place  Advance diet as tolerated along with nutritional supplements    DORCAS on CKD stage III -likely pre-renal in setting of poor PO intake  -Creatinine 2.6 from new baseline 1.7-1.9 on admission  -Bladder scan, strict I/Os, UA  -IVF hydration  -Nephrology consulted, appreciate visualized. Bones/Soft Tissues: No acute osseous abnormality is detected. Moderate right lower lung airspace disease compatible with pneumonia. Right lobectomy. Moderate COPD greater on the right. Stable 4.0 x 3.3 cm infrarenal abdominal aortic aneurysm. Follow-up examination in 1 year is recommended. Vascular consultation is recommended. The thoracic and abdominal aorta are not opacified with contrast due to the phase of imaging. Mild pulmonary artery enlargement compatible with pulmonary hypertension. Stable 1 cm lucent lesion within the right side of the L3 vertebral body may represent small myeloma lesion. Old T11 compression fracture.  RECOMMENDATIONS:       Electronically signed by Farhat Matute MD on 9/5/2023 at 8:42 AM

## 2023-09-05 NOTE — CARE COORDINATION
Met with patient briefly; this is a readmission (discharged 8/27) Patient is from home with spouse. [facility]) and insurance that assist with Rx when needed. Patient declined [address] on last discharge. HHC (minimum) is recommended by this CM; may need skilled stay. Humera Hill RN    09/05/23 0270   Service Assessment   Patient Orientation Alert and Oriented   Cognition Alert   History Provided By Patient;Medical Record   Primary Caregiver Self   Support Systems Spouse/Significant Other;Children   Patient's Healthcare Decision Maker is: Legal Next of Kin   PCP Verified by CM Yes   Last Visit to PCP Within last 3 months   Prior Functional Level Independent in ADLs/IADLs   Current Functional Level Assistance with the following:;Bathing; Toileting;Mobility  [facility] policy)   Can patient return to prior living arrangement Yes   Ability to make needs known: Good   Family able to assist with home care needs: Other (comment)  (Unsure)   Would you like for me to discuss the discharge plan with any other family members/significant others, and if so, who? No   Financial Resources Medicare; ([facility])   Community Resources None   Social/Functional History   Lives With Spouse   Type of Home House   Occupation Retired   Services At/After Discharge   [address] Provided?  No

## 2023-09-06 LAB
ANION GAP SERPL CALCULATED.3IONS-SCNC: 18 MMOL/L (ref 4–16)
BASOPHILS ABSOLUTE: 0 K/CU MM
BASOPHILS RELATIVE PERCENT: 0.5 % (ref 0–1)
BUN SERPL-MCNC: 58 MG/DL (ref 6–23)
CALCIUM SERPL-MCNC: 9.5 MG/DL (ref 8.3–10.6)
CHLORIDE BLD-SCNC: 108 MMOL/L (ref 99–110)
CO2: 17 MMOL/L (ref 21–32)
CREAT SERPL-MCNC: 2.3 MG/DL (ref 0.9–1.3)
DIFFERENTIAL TYPE: ABNORMAL
DOSE AMOUNT: NORMAL
DOSE TIME: NORMAL
EOSINOPHILS ABSOLUTE: 0.1 K/CU MM
EOSINOPHILS RELATIVE PERCENT: 3.3 % (ref 0–3)
GFR SERPL CREATININE-BSD FRML MDRD: 29 ML/MIN/1.73M2
GLUCOSE BLD-MCNC: 152 MG/DL (ref 70–99)
GLUCOSE BLD-MCNC: 161 MG/DL (ref 70–99)
GLUCOSE BLD-MCNC: 212 MG/DL (ref 70–99)
GLUCOSE SERPL-MCNC: 143 MG/DL (ref 70–99)
HAPTOGLOB SERPL-MCNC: 187 MG/DL (ref 30–200)
HCT VFR BLD CALC: 25.2 % (ref 42–52)
HEMOGLOBIN: 7.5 GM/DL (ref 13.5–18)
IMMATURE NEUTROPHIL %: 0.9 % (ref 0–0.43)
LYMPHOCYTES ABSOLUTE: 0.9 K/CU MM
LYMPHOCYTES RELATIVE PERCENT: 20.3 % (ref 24–44)
MCH RBC QN AUTO: 33 PG (ref 27–31)
MCHC RBC AUTO-ENTMCNC: 29.8 % (ref 32–36)
MCV RBC AUTO: 111 FL (ref 78–100)
MONOCYTES ABSOLUTE: 0.5 K/CU MM
MONOCYTES RELATIVE PERCENT: 10.7 % (ref 0–4)
NUCLEATED RBC %: 0 %
PDW BLD-RTO: 17.7 % (ref 11.7–14.9)
PLATELET # BLD: 213 K/CU MM (ref 140–440)
PMV BLD AUTO: 10.3 FL (ref 7.5–11.1)
POTASSIUM SERPL-SCNC: 3.5 MMOL/L (ref 3.5–5.1)
PROCALCITONIN SERPL-MCNC: 0.64 NG/ML
RBC # BLD: 2.27 M/CU MM (ref 4.6–6.2)
SEGMENTED NEUTROPHILS ABSOLUTE COUNT: 2.8 K/CU MM
SEGMENTED NEUTROPHILS RELATIVE PERCENT: 64.3 % (ref 36–66)
SODIUM BLD-SCNC: 143 MMOL/L (ref 135–145)
TOTAL IMMATURE NEUTOROPHIL: 0.04 K/CU MM
TOTAL NUCLEATED RBC: 0 K/CU MM
VANCOMYCIN RANDOM: 20.2 UG/ML
WBC # BLD: 4.3 K/CU MM (ref 4–10.5)

## 2023-09-06 PROCEDURE — 6360000002 HC RX W HCPCS: Performed by: SPECIALIST

## 2023-09-06 PROCEDURE — 80202 ASSAY OF VANCOMYCIN: CPT

## 2023-09-06 PROCEDURE — 2580000003 HC RX 258: Performed by: STUDENT IN AN ORGANIZED HEALTH CARE EDUCATION/TRAINING PROGRAM

## 2023-09-06 PROCEDURE — 6370000000 HC RX 637 (ALT 250 FOR IP): Performed by: INTERNAL MEDICINE

## 2023-09-06 PROCEDURE — 6370000000 HC RX 637 (ALT 250 FOR IP): Performed by: STUDENT IN AN ORGANIZED HEALTH CARE EDUCATION/TRAINING PROGRAM

## 2023-09-06 PROCEDURE — 94640 AIRWAY INHALATION TREATMENT: CPT

## 2023-09-06 PROCEDURE — 99222 1ST HOSP IP/OBS MODERATE 55: CPT | Performed by: SPECIALIST

## 2023-09-06 PROCEDURE — 80048 BASIC METABOLIC PNL TOTAL CA: CPT

## 2023-09-06 PROCEDURE — 6360000002 HC RX W HCPCS: Performed by: STUDENT IN AN ORGANIZED HEALTH CARE EDUCATION/TRAINING PROGRAM

## 2023-09-06 PROCEDURE — 85025 COMPLETE CBC W/AUTO DIFF WBC: CPT

## 2023-09-06 PROCEDURE — 6370000000 HC RX 637 (ALT 250 FOR IP)

## 2023-09-06 PROCEDURE — 84145 PROCALCITONIN (PCT): CPT

## 2023-09-06 PROCEDURE — 99231 SBSQ HOSP IP/OBS SF/LOW 25: CPT | Performed by: INTERNAL MEDICINE

## 2023-09-06 PROCEDURE — 94761 N-INVAS EAR/PLS OXIMETRY MLT: CPT

## 2023-09-06 PROCEDURE — APPSS30 APP SPLIT SHARED TIME 16-30 MINUTES

## 2023-09-06 PROCEDURE — 2060000000 HC ICU INTERMEDIATE R&B

## 2023-09-06 PROCEDURE — 36415 COLL VENOUS BLD VENIPUNCTURE: CPT

## 2023-09-06 PROCEDURE — 99232 SBSQ HOSP IP/OBS MODERATE 35: CPT | Performed by: INTERNAL MEDICINE

## 2023-09-06 PROCEDURE — 82962 GLUCOSE BLOOD TEST: CPT

## 2023-09-06 PROCEDURE — 2580000003 HC RX 258: Performed by: SPECIALIST

## 2023-09-06 PROCEDURE — 2700000000 HC OXYGEN THERAPY PER DAY

## 2023-09-06 RX ORDER — CITRIC ACID/SODIUM CITRATE 334-500MG
30 SOLUTION, ORAL ORAL 3 TIMES DAILY
Status: DISPENSED | OUTPATIENT
Start: 2023-09-06 | End: 2023-09-07

## 2023-09-06 RX ORDER — DILTIAZEM HYDROCHLORIDE 60 MG/1
60 TABLET, FILM COATED ORAL EVERY 6 HOURS SCHEDULED
Status: DISCONTINUED | OUTPATIENT
Start: 2023-09-06 | End: 2023-09-06

## 2023-09-06 RX ORDER — DILTIAZEM HYDROCHLORIDE 60 MG/1
60 TABLET, FILM COATED ORAL EVERY 6 HOURS SCHEDULED
Status: DISCONTINUED | OUTPATIENT
Start: 2023-09-06 | End: 2023-09-09 | Stop reason: HOSPADM

## 2023-09-06 RX ADMIN — SODIUM CITRATE AND CITRIC ACID MONOHYDRATE 30 ML: 500; 334 SOLUTION ORAL at 17:40

## 2023-09-06 RX ADMIN — CYANOCOBALAMIN TAB 1000 MCG 1000 MCG: 1000 TAB at 09:28

## 2023-09-06 RX ADMIN — MEGESTROL ACETATE 200 MG: 40 SUSPENSION ORAL at 09:21

## 2023-09-06 RX ADMIN — METOPROLOL TARTRATE 50 MG: 50 TABLET, FILM COATED ORAL at 09:29

## 2023-09-06 RX ADMIN — CEFEPIME 1000 MG: 1 INJECTION, POWDER, FOR SOLUTION INTRAMUSCULAR; INTRAVENOUS at 21:28

## 2023-09-06 RX ADMIN — IPRATROPIUM BROMIDE AND ALBUTEROL SULFATE 1 DOSE: 2.5; .5 SOLUTION RESPIRATORY (INHALATION) at 12:51

## 2023-09-06 RX ADMIN — SODIUM CHLORIDE, PRESERVATIVE FREE 10 ML: 5 INJECTION INTRAVENOUS at 20:57

## 2023-09-06 RX ADMIN — IPRATROPIUM BROMIDE AND ALBUTEROL SULFATE 1 DOSE: 2.5; .5 SOLUTION RESPIRATORY (INHALATION) at 16:16

## 2023-09-06 RX ADMIN — IRON SUCROSE 300 MG: 20 INJECTION, SOLUTION INTRAVENOUS at 18:53

## 2023-09-06 RX ADMIN — GUAIFENESIN 600 MG: 600 TABLET, EXTENDED RELEASE ORAL at 20:56

## 2023-09-06 RX ADMIN — HYDROMORPHONE HYDROCHLORIDE 0.5 MG: 1 INJECTION, SOLUTION INTRAMUSCULAR; INTRAVENOUS; SUBCUTANEOUS at 20:57

## 2023-09-06 RX ADMIN — CEFEPIME 1000 MG: 1 INJECTION, POWDER, FOR SOLUTION INTRAMUSCULAR; INTRAVENOUS at 09:45

## 2023-09-06 RX ADMIN — SODIUM ZIRCONIUM CYCLOSILICATE 10 G: 10 POWDER, FOR SUSPENSION ORAL at 09:32

## 2023-09-06 RX ADMIN — ATORVASTATIN CALCIUM 40 MG: 40 TABLET, FILM COATED ORAL at 20:56

## 2023-09-06 RX ADMIN — GUAIFENESIN 600 MG: 600 TABLET, EXTENDED RELEASE ORAL at 09:28

## 2023-09-06 RX ADMIN — DILTIAZEM HYDROCHLORIDE 60 MG: 60 TABLET, FILM COATED ORAL at 17:40

## 2023-09-06 RX ADMIN — DILTIAZEM HYDROCHLORIDE 60 MG: 60 TABLET, FILM COATED ORAL at 12:35

## 2023-09-06 RX ADMIN — ALLOPURINOL 300 MG: 100 TABLET ORAL at 09:23

## 2023-09-06 RX ADMIN — METOPROLOL TARTRATE 50 MG: 50 TABLET, FILM COATED ORAL at 20:56

## 2023-09-06 RX ADMIN — FOLIC ACID 1 MG: 1 TABLET ORAL at 09:28

## 2023-09-06 RX ADMIN — DILTIAZEM HYDROCHLORIDE 30 MG: 30 TABLET, FILM COATED ORAL at 06:10

## 2023-09-06 RX ADMIN — INSULIN GLARGINE 8 UNITS: 100 INJECTION, SOLUTION SUBCUTANEOUS at 09:32

## 2023-09-06 RX ADMIN — IPRATROPIUM BROMIDE AND ALBUTEROL SULFATE 1 DOSE: 2.5; .5 SOLUTION RESPIRATORY (INHALATION) at 07:29

## 2023-09-06 RX ADMIN — HYDROMORPHONE HYDROCHLORIDE 0.5 MG: 1 INJECTION, SOLUTION INTRAMUSCULAR; INTRAVENOUS; SUBCUTANEOUS at 09:20

## 2023-09-06 RX ADMIN — SODIUM CHLORIDE, PRESERVATIVE FREE 10 ML: 5 INJECTION INTRAVENOUS at 09:33

## 2023-09-06 RX ADMIN — IPRATROPIUM BROMIDE AND ALBUTEROL SULFATE 1 DOSE: 2.5; .5 SOLUTION RESPIRATORY (INHALATION) at 20:10

## 2023-09-06 RX ADMIN — FERROUS GLUCONATE 324 MG: 324 TABLET ORAL at 09:29

## 2023-09-06 ASSESSMENT — PAIN DESCRIPTION - DESCRIPTORS: DESCRIPTORS: ACHING

## 2023-09-06 ASSESSMENT — PAIN DESCRIPTION - ONSET: ONSET: ON-GOING

## 2023-09-06 ASSESSMENT — PAIN DESCRIPTION - ORIENTATION: ORIENTATION: RIGHT

## 2023-09-06 ASSESSMENT — PAIN SCALES - GENERAL
PAINLEVEL_OUTOF10: 7
PAINLEVEL_OUTOF10: 8

## 2023-09-06 ASSESSMENT — PAIN DESCRIPTION - PAIN TYPE: TYPE: CHRONIC PAIN

## 2023-09-06 ASSESSMENT — PAIN DESCRIPTION - FREQUENCY: FREQUENCY: CONTINUOUS

## 2023-09-06 ASSESSMENT — PAIN DESCRIPTION - LOCATION
LOCATION: GENERALIZED
LOCATION: SHOULDER

## 2023-09-06 ASSESSMENT — PAIN - FUNCTIONAL ASSESSMENT: PAIN_FUNCTIONAL_ASSESSMENT: PREVENTS OR INTERFERES SOME ACTIVE ACTIVITIES AND ADLS

## 2023-09-06 NOTE — PROGRESS NOTES
Oncology notified: Pt's daughter notified me that she was incorrect about pt's meds, pt does remember receiving meds from the [place] and pt's son searched his house and was unable to find them.  Thanks

## 2023-09-06 NOTE — PROGRESS NOTES
09/06/23 0925   Encounter Summary   Encounter Overview/Reason  Attempted Encounter   Service Provided For: Patient not available  (RN caring for patient; not available for  visit.)   Referral/Consult From: Rounding   Support System Spouse; Children   Last Encounter  09/06/23  (RN caring for patient; pt. not available for  visit.   No tish denomination listed.)   Complexity of Encounter Low   Begin Time 0919   End Time  0927   Total Time Calculated 8 min   Spiritual/Emotional needs   Type Spiritual Support   Assessment/Intervention/Outcome   Assessment Unable to assess   Plan and Referrals   Plan/Referrals Continue to visit, (comment)

## 2023-09-06 NOTE — CONSULTS
CORONARY ARTERY BYPASS GRAFT      CYSTOSCOPY N/A 05/09/2017    cysto, left rgpg, left stent    KIDNEY STONE REMOVAL      UPPER GASTROINTESTINAL ENDOSCOPY N/A 08/09/2023    EGD BIOPSIES to rule out H-Pyloric performed by Priscilla Jolley MD at [facility] ENDOSCOPY       Medications Prior to Admission:    Prior to Admission medications    Medication Sig Start Date End Date Taking? Authorizing Provider   lenalidomide (REVLIMID) 10 MG chemo capsule Take 1 capsule by mouth daily for 21 days on then 7 days off every 28 days. 8/29/23   Sanket Parekh MD   sodium zirconium cyclosilicate (LOKELMA) 10 g PACK oral suspension Take 1 packet by mouth daily 8/28/23   Festus Streeter DO   allopurinol (ZYLOPRIM) 300 MG tablet Take 1 tablet by mouth daily 8/28/23   Sanket Parekh MD   insulin glargine (LANTUS) 100 UNIT/ML injection vial Inject 8 Units into the skin every morning 8/27/23   [facility], APRN - CNP   folic acid (FOLVITE) 1 MG tablet Take 1 tablet by mouth daily 8/11/23   Conner Merino MD   vitamin B-12 1000 MCG tablet Take 1 tablet by mouth daily 8/11/23   Conner Merino MD   pantoprazole (PROTONIX) 40 MG tablet Take 1 tablet by mouth in the morning and at bedtime for 14 days, THEN 1 tablet daily. 8/10/23 9/23/23  Conner Merino MD   ferrous gluconate (FERGON) 324 (38 Fe) MG tablet Take 1 tablet by mouth daily (with breakfast)    Historical Provider, MD   metoprolol tartrate (LOPRESSOR) 50 MG tablet Take 0.5 tablets by mouth 2 times daily 08/07/23 Splits a 50 mg tablet in half for 25 mg dosing BID    Historical Provider, MD   atorvastatin (LIPITOR) 40 MG tablet Take 1 tablet by mouth nightly 08/07/23 Patient splits an 80 mg tablet in half for 40 mg dosing    Historical Provider, MD   amLODIPine (NORVASC) 10 MG tablet Take 1 tablet by mouth daily    Historical Provider, MD   aspirin 81 MG EC tablet Take 1 tablet by mouth daily    Historical Provider, MD       Allergies:  No Known Allergies.     Social History:    TOBACCO: polyps- all removed except for a 2 cm sessile benign polyp in the transverse colon that will require eventual referral for endoscopic removal  3) history of H.pylori-gastritis--treated last month  4) History of lung cancer and metastatic multiple myeloma  5) Severe Malnutrition   6) anemia- multifactorial- prob related to #2,3,4, and 5 above    RECOMMENDATIONS:  1) would proceed with anticoagulation in spite of the colon polyp-- it is not likely to cause severe acute GI bleeding but rather more occult bleeding- once cardiac status stable, then refer for repeat colonoscopy with EMR  2) continue PPI for now  3) replace iron parenterally with Venofer  4) check for eradication of H pylori with stool antigen test-- ideally should be off PPI for 2 weeks before testing but I would not do that now  5) monitor H/H and if significant bleeding ensues will re-evaluate      Tawana Gilbert M.D

## 2023-09-06 NOTE — CARE COORDINATION
Spoek with son. Pt has large polyp that needs to be removed per the pt last admission, and wanted to know what doc did that so he could reach out to address getting it fixed. Dr Ida Robbins given.

## 2023-09-06 NOTE — PROGRESS NOTES
Patients 02 off attempted to replace oxygen and patient hit this nurses hand away. With much encouragement patient agreed to wear 02 and 02 sat 81 percent at this time. 02 increased to 10 L per high flow.

## 2023-09-06 NOTE — PLAN OF CARE
Problem: Discharge Planning  Goal: Discharge to home or other facility with appropriate resources  9/6/2023 1222 by Katherine Mcmillan RN  Outcome: Progressing  Flowsheets (Taken 9/6/2023 0800)  Discharge to home or other facility with appropriate resources:   Identify barriers to discharge with patient and caregiver   Arrange for needed discharge resources and transportation as appropriate   Identify discharge learning needs (meds, wound care, etc)   Refer to discharge planning if patient needs post-hospital services based on physician order or complex needs related to functional status, cognitive ability or social support system  9/5/2023 2223 by Melany Lombard, RN  Outcome: Progressing     Problem: Chronic Conditions and Co-morbidities  Goal: Patient's chronic conditions and co-morbidity symptoms are monitored and maintained or improved  9/6/2023 1222 by Katherine Mcmillan RN  Outcome: Progressing  Flowsheets (Taken 9/6/2023 0800)  Care Plan - Patient's Chronic Conditions and Co-Morbidity Symptoms are Monitored and Maintained or Improved:   Monitor and assess patient's chronic conditions and comorbid symptoms for stability, deterioration, or improvement   Collaborate with multidisciplinary team to address chronic and comorbid conditions and prevent exacerbation or deterioration   Update acute care plan with appropriate goals if chronic or comorbid symptoms are exacerbated and prevent overall improvement and discharge  9/5/2023 2223 by Melany Lombard, RN  Outcome: Progressing     Problem: Pain  Goal: Verbalizes/displays adequate comfort level or baseline comfort level  9/6/2023 1222 by Katherine Mcmillan RN  Outcome: Progressing  Flowsheets (Taken 9/6/2023 0800)  Verbalizes/displays adequate comfort level or baseline comfort level:   Encourage patient to monitor pain and request assistance   Assess pain using appropriate pain scale   Administer analgesics based on type and severity of pain and evaluate response Implement non-pharmacological measures as appropriate and evaluate response   Consider cultural and social influences on pain and pain management   Notify Licensed Independent Practitioner if interventions unsuccessful or patient reports new pain  9/5/2023 2223 by Alessandra Nelson RN  Outcome: Progressing     Problem: Safety - Adult  Goal: Free from fall injury  9/6/2023 1222 by Greta Charles RN  Outcome: Progressing  9/5/2023 2223 by Alessandra Nelson RN  Outcome: Progressing     Problem: ABCDS Injury Assessment  Goal: Absence of physical injury  9/6/2023 1222 by Greta Charles RN  Outcome: Progressing  9/5/2023 2223 by Alessandra Nelson RN  Outcome: Progressing

## 2023-09-06 NOTE — PROGRESS NOTES
This nurse handing report off to on coming nurse at bedside with daughter present. Daughter asked this nurse Poli alcazar the nurse I talked to this am about my dad and I was informed that he was fine and that no procedure was going to be done today\" Informed daughter she did not speak to this nurse. This nurse asked daughter if she had name of nurse she spoke with and stated \"I have it written down downstairs and I have a witness\". Informed daughter it would be informative to know nurses name so that any issues with communication can be resolved. This nurse informing on coming nurse of patients behaviors this am and refusal of treatments and daughter asked staff to step in hallway. Stated \"I asked to be informed if patient was refusing anything during night\" Informed daughter this nurse was not aware of that information and that the refusal of meds and treatments was this am. Daughter then stated \"I dont think any negative comments around him will help\" daughter was educated on nurses doing bedside report.

## 2023-09-06 NOTE — PROGRESS NOTES
On [place]  OBTAIN BMP TODAY PLEASE  CONSIDER AN ANTIBIOTIC DIFFERENT FROM VANCOMYCIN IF POSSIBLE  CONTINUE DAILY LOKELMA FOR NORMOKALEMIA MAINTENANCE    [address] [place]    Patient Active Problem List    Diagnosis Date Noted    Elevated troponin 09/04/2023    Sepsis with acute hypoxic respiratory failure without septic shock ([address]) 09/03/2023    Pneumonia, unspecified organism 09/02/2023    Need for hepatitis B screening test 08/28/2023    Hyperkalemia 08/25/2023    Iron deficiency anemia 08/09/2023    Anemia due to GI blood loss 08/09/2023    Severe malnutrition ([address]) 08/09/2023    Macrocytic anemia 08/07/2023    Multiple myeloma ([address]) 08/07/2023    Ureteral calculus, left 05/09/2017    Shoulder pain, left 12/15/2013    Neck pain on left side 12/15/2013    Essential hypertension, benign 12/15/2013    Other and unspecified hyperlipidemia 12/15/2013    Coronary atherosclerosis 12/15/2013    Type II or unspecified type diabetes mellitus without mention of complication, not stated as uncontrolled 12/15/2013    Smoker 12/15/2013    Angina pectoris ([address]) 12/14/2013

## 2023-09-06 NOTE — PLAN OF CARE
Problem: Discharge Planning  Goal: Discharge to home or other facility with appropriate resources  9/5/2023 2223 by Nabil Figueroa RN  Outcome: Progressing  9/5/2023 1144 by Caron Perla RN  Outcome: Progressing  Flowsheets (Taken 9/5/2023 0800)  Discharge to home or other facility with appropriate resources:   Identify barriers to discharge with patient and caregiver   Arrange for needed discharge resources and transportation as appropriate   Identify discharge learning needs (meds, wound care, etc)   Refer to discharge planning if patient needs post-hospital services based on physician order or complex needs related to functional status, cognitive ability or social support system     Problem: Chronic Conditions and Co-morbidities  Goal: Patient's chronic conditions and co-morbidity symptoms are monitored and maintained or improved  9/5/2023 2223 by Nabil Figueroa RN  Outcome: Progressing  9/5/2023 1144 by Caron Perla RN  Outcome: Progressing  Flowsheets (Taken 9/5/2023 0800)  Care Plan - Patient's Chronic Conditions and Co-Morbidity Symptoms are Monitored and Maintained or Improved:   Monitor and assess patient's chronic conditions and comorbid symptoms for stability, deterioration, or improvement   Collaborate with multidisciplinary team to address chronic and comorbid conditions and prevent exacerbation or deterioration   Update acute care plan with appropriate goals if chronic or comorbid symptoms are exacerbated and prevent overall improvement and discharge     Problem: Pain  Goal: Verbalizes/displays adequate comfort level or baseline comfort level  9/5/2023 2223 by Nabil Figueroa RN  Outcome: Progressing  9/5/2023 1144 by Caron Perla RN  Outcome: Progressing  Flowsheets (Taken 9/5/2023 0914)  Verbalizes/displays adequate comfort level or baseline comfort level:   Encourage patient to monitor pain and request assistance   Assess pain using appropriate pain scale   Administer

## 2023-09-06 NOTE — PROGRESS NOTES
Message sent to hospitalist concerning heart rate sustaining in the 130's and 140's. Patient has no c/o concerning heart rate.

## 2023-09-06 NOTE — CARE COORDINATION
Son called  Re: upset regarding a procedure for the patient to shock his heard that was not disussed with the family. Wanted to know what is going on. Offered to get the hopsitalist to call, but stated that cardio may be the ones he wants to talk to. He asked for pt advocate, and I transferred.  delmar

## 2023-09-06 NOTE — PROGRESS NOTES
Oral BID    cefepime  1,000 mg IntraVENous Q12H    insulin lispro  0-8 Units SubCUTAneous TID WC    insulin lispro  0-4 Units SubCUTAneous Nightly      sodium chloride 25 mL (09/03/23 2159)    dextrose       HYDROmorphone, sodium chloride flush, sodium chloride, ondansetron **OR** ondansetron, polyethylene glycol, acetaminophen **OR** acetaminophen, glucose, dextrose bolus **OR** dextrose bolus, glucagon (rDNA), dextrose    Lab Data:  CBC:   Recent Labs     09/04/23  0308 09/05/23 0013 09/06/23  0757   WBC 2.6* 2.6* 4.3   HGB 7.4* 8.1* 7.5*   HCT 25.7* 30.0* 25.2*   .7* 120.0* 111.0*    197 213       BMP:   Recent Labs     09/04/23 0308 09/05/23 0013 09/06/23 0757    141 143   K 4.2 3.7 3.5    108 108   CO2 18* 19* 17*   BUN 57* 59* 58*   CREATININE 2.4* 2.5* 2.3*       LIVER PROFILE:   Recent Labs     09/03/23  1506   AST 21   ALT 10   BILITOT 0.2   ALKPHOS 110       PT/INR:   Recent Labs     09/04/23 0308   PROTIME 16.0*   INR 1.3       APTT:   Recent Labs     09/04/23 0308   APTT 41.0*       BNP:  No results for input(s): BNP in the last 72 hours. TROPONIN:   Recent Labs     09/03/23  1506   TROPONINT <0.010       Labs, consult, tests reviewed          Eva Moscoso PA-C, 9/6/2023 1:00 PM    CARDIOLOGY ATTENDING ADDENDUM    MEDICAL DECISION MAKING:    Atrial fibrillation with rapid ventricular rate  Acute on chronic kidney injury  Severe anemia  Newly diagnosed multiple myeloma     For now we will continue with metoprolol and diltiazem. His WILL was canceled today because of high oxygen demands. We will hold off on anticoagulation for now. He is okay to start on daratumumab from cardiology standpoint. HPI:  I have reviewed the HPI  And agree with above   Johnna Rosas is a 74 y. o.year old who and presents with had concerns including Abdominal Pain.   Chief Complaint   Patient presents with    Abdominal Pain     Please review addendum/changes made to note above   Interval history: Continues to have atrial fibrillation with heart rate mostly 90s to 120s. Physical Exam:  Cachectic appearing male, in no distress  Irregular pulse  Awake and alert  Pulses are palpable  Abdomen is nontender      I agree with the plan, which was planned by myself and discussed with advanced level provider. My documented MDM is a substantive portion of the supervisory note. I have seen ,spoken to  and examined this patient personally, independently of the advanced level provider. I have spent substantiate  portion of this encounter independently myself in examining patient and developing the medical management plan . I have reviewed the hospital care given to date and reviewed all pertinent labs and imaging. The plan was developed mutually at the time of the visit with the patient,  NP /PA  and myself. I have spoken with patient, nursing staff and provided written and verbal instructions . The above note has been reviewed and I agree with the assessment, diagnosis, and treatment plan with changes made by me as follows .     Silvino Crabtree MD

## 2023-09-06 NOTE — CARE COORDINATION
09/06/23 1038   /Social Work Whiteboard Notes   /Social Work Whiteboard 9/6 1030 need pt ot placement sdw

## 2023-09-06 NOTE — PROGRESS NOTES
Cardiology notified: Pt's Hakeem Dominique is bedside and states they have had no communication about procedure today and would like to speak with you about it. Are you able to come bedside to speak with pt and family?  Thanks

## 2023-09-06 NOTE — PROGRESS NOTES
V2.0  [facility] [place]      Name:  Melanie Perez /Age/Sex:   (74 y.o. male)   MRN & CSN:  [id number] & [id number] Encounter Date/Time: 2023 12:55 PM EDT    Location:  [address] [place] [address] PCP: No primary care provider on file. Hospital Day: 5    Assessment and Plan:   Melanie Perez is a 74 y.o. male with pmh of recent Dx of MM, getting eval for large colon polyp that needs t be taken care of at [facility] who presents with Pneumonia, unspecified organism      Plan:    Severe sepsis 2/2 pneumonia  Acute hypoxic respiratory failure 2/2 pneumonia  History of COPD  History of lung cancer -s/p resection, no systemic treatment  -Pt recently discharged from hospital.  Presents with SOB, hypoxic on arrival requiring 3 L oxygen (no baseline oxygen requirement). -Procalcitonin 2.23 -> 1.93,   -Lactic acid 2.7 (normalized). Rapid flu and COVID-negative.  -Resp panel neg  -CTA chest/abdomen/pelvis done in ED shows moderate right lower lobe airspace disease concerning for pneumonia, right lobectomy  -S/p sepsis fluids, continue maintenance IVF  -Initiated vancomycin/cefepime, follow infectious/inflammatory work-up  -Bcx NTD    New onset Afib  -Noted on EKG, repeat, maintain telemetry  -Will not anticoagulate as very recent hx of concern for GIB (2023)  -Cardiology was consulted who recommends to stop amlodipine,change to PO cardizem 30 mg every 6 hrs  -High risk for oral AC given MM and recent GIB  Patient is high risk for oral anticoagulant given multiple myeloma and recent GI bleeding  Continue aspirin for now  -Echo   --120s today  -plan for WILL cardioversion today  -Will consult GI to risk stratify as patient will need AC post cardioversion.      Severe malnutrition:  Meets [place] criteria  Nutrition consult in place  Advance diet as tolerated along with nutritional supplements    DORCAS on CKD stage III -likely pre-renal in setting of poor PO intake  -Creatinine 2.6 from new baseline

## 2023-09-07 ENCOUNTER — APPOINTMENT (OUTPATIENT)
Dept: GENERAL RADIOLOGY | Age: 73
End: 2023-09-07
Payer: OTHER GOVERNMENT

## 2023-09-07 ENCOUNTER — CLINICAL DOCUMENTATION (OUTPATIENT)
Dept: ONCOLOGY | Age: 73
End: 2023-09-07

## 2023-09-07 LAB
ALBUMIN SERPL-MCNC: 2.8 GM/DL (ref 3.4–5)
ALP BLD-CCNC: 160 IU/L (ref 40–128)
ALT SERPL-CCNC: 12 U/L (ref 10–40)
ANION GAP SERPL CALCULATED.3IONS-SCNC: 17 MMOL/L (ref 4–16)
AST SERPL-CCNC: 19 IU/L (ref 15–37)
BASE EXCESS: 5 (ref 0–3.3)
BASE EXCESS: 5 (ref 0–3.3)
BASE EXCESS: 7 (ref 0–3.3)
BILIRUB SERPL-MCNC: 0.2 MG/DL (ref 0–1)
BUN SERPL-MCNC: 56 MG/DL (ref 6–23)
CALCIUM SERPL-MCNC: 9.7 MG/DL (ref 8.3–10.6)
CARBON MONOXIDE, BLOOD: 1.8 % (ref 0–5)
CARBON MONOXIDE, BLOOD: 2 % (ref 0–5)
CARBON MONOXIDE, BLOOD: 2.3 % (ref 0–5)
CHLORIDE BLD-SCNC: 107 MMOL/L (ref 99–110)
CO2 CONTENT: 23.9 MMOL/L (ref 19–24)
CO2 CONTENT: 25.3 MMOL/L (ref 19–24)
CO2 CONTENT: 27.8 MMOL/L (ref 19–24)
CO2: 18 MMOL/L (ref 21–32)
COMMENT: ABNORMAL
CREAT SERPL-MCNC: 2.3 MG/DL (ref 0.9–1.3)
CULTURE: NORMAL
CULTURE: NORMAL
GFR SERPL CREATININE-BSD FRML MDRD: 29 ML/MIN/1.73M2
GLUCOSE BLD-MCNC: 140 MG/DL (ref 70–99)
GLUCOSE BLD-MCNC: 192 MG/DL (ref 70–99)
GLUCOSE BLD-MCNC: 272 MG/DL (ref 70–99)
GLUCOSE BLD-MCNC: 284 MG/DL (ref 70–99)
GLUCOSE BLD-MCNC: 294 MG/DL (ref 70–99)
GLUCOSE SERPL-MCNC: 258 MG/DL (ref 70–99)
HCO3 ARTERIAL: 22.4 MMOL/L (ref 18–23)
HCO3 ARTERIAL: 23.6 MMOL/L (ref 18–23)
HCO3 ARTERIAL: 25.2 MMOL/L (ref 18–23)
HCT VFR BLD CALC: 26.2 % (ref 42–52)
HEMOGLOBIN: 7.4 GM/DL (ref 13.5–18)
Lab: NORMAL
Lab: NORMAL
MCH RBC QN AUTO: 32.3 PG (ref 27–31)
MCHC RBC AUTO-ENTMCNC: 28.2 % (ref 32–36)
MCV RBC AUTO: 114.4 FL (ref 78–100)
METHEMOGLOBIN ARTERIAL: 1 %
METHEMOGLOBIN ARTERIAL: 1.2 %
METHEMOGLOBIN ARTERIAL: 1.3 %
O2 SATURATION: 88.8 % (ref 96–97)
O2 SATURATION: 92.6 % (ref 96–97)
O2 SATURATION: 94.2 % (ref 96–97)
PCO2 ARTERIAL: 50 MMHG (ref 32–45)
PCO2 ARTERIAL: 55 MMHG (ref 32–45)
PCO2 ARTERIAL: 85 MMHG (ref 32–45)
PDW BLD-RTO: 17.5 % (ref 11.7–14.9)
PH BLOOD: 7.08 (ref 7.34–7.45)
PH BLOOD: 7.24 (ref 7.34–7.45)
PH BLOOD: 7.26 (ref 7.34–7.45)
PLATELET # BLD: 238 K/CU MM (ref 140–440)
PMV BLD AUTO: 10.1 FL (ref 7.5–11.1)
PO2 ARTERIAL: 101 MMHG (ref 75–100)
PO2 ARTERIAL: 62 MMHG (ref 75–100)
PO2 ARTERIAL: 74 MMHG (ref 75–100)
POTASSIUM SERPL-SCNC: 3.8 MMOL/L (ref 3.5–5.1)
PROCALCITONIN SERPL-MCNC: 0.56 NG/ML
RBC # BLD: 2.29 M/CU MM (ref 4.6–6.2)
SODIUM BLD-SCNC: 142 MMOL/L (ref 135–145)
SPECIMEN: NORMAL
SPECIMEN: NORMAL
TOTAL PROTEIN: 7.7 GM/DL (ref 6.4–8.2)
WBC # BLD: 9.2 K/CU MM (ref 4–10.5)

## 2023-09-07 PROCEDURE — 2580000003 HC RX 258

## 2023-09-07 PROCEDURE — 2700000000 HC OXYGEN THERAPY PER DAY

## 2023-09-07 PROCEDURE — 71045 X-RAY EXAM CHEST 1 VIEW: CPT

## 2023-09-07 PROCEDURE — 94761 N-INVAS EAR/PLS OXIMETRY MLT: CPT

## 2023-09-07 PROCEDURE — 2500000003 HC RX 250 WO HCPCS: Performed by: NURSE PRACTITIONER

## 2023-09-07 PROCEDURE — 2580000003 HC RX 258: Performed by: STUDENT IN AN ORGANIZED HEALTH CARE EDUCATION/TRAINING PROGRAM

## 2023-09-07 PROCEDURE — 36600 WITHDRAWAL OF ARTERIAL BLOOD: CPT

## 2023-09-07 PROCEDURE — 6370000000 HC RX 637 (ALT 250 FOR IP): Performed by: STUDENT IN AN ORGANIZED HEALTH CARE EDUCATION/TRAINING PROGRAM

## 2023-09-07 PROCEDURE — 93005 ELECTROCARDIOGRAM TRACING: CPT | Performed by: STUDENT IN AN ORGANIZED HEALTH CARE EDUCATION/TRAINING PROGRAM

## 2023-09-07 PROCEDURE — APPSS30 APP SPLIT SHARED TIME 16-30 MINUTES

## 2023-09-07 PROCEDURE — 6360000002 HC RX W HCPCS: Performed by: STUDENT IN AN ORGANIZED HEALTH CARE EDUCATION/TRAINING PROGRAM

## 2023-09-07 PROCEDURE — 94660 CPAP INITIATION&MGMT: CPT

## 2023-09-07 PROCEDURE — 94640 AIRWAY INHALATION TREATMENT: CPT

## 2023-09-07 PROCEDURE — 84145 PROCALCITONIN (PCT): CPT

## 2023-09-07 PROCEDURE — 2060000000 HC ICU INTERMEDIATE R&B

## 2023-09-07 PROCEDURE — 5A09357 ASSISTANCE WITH RESPIRATORY VENTILATION, LESS THAN 24 CONSECUTIVE HOURS, CONTINUOUS POSITIVE AIRWAY PRESSURE: ICD-10-PCS | Performed by: STUDENT IN AN ORGANIZED HEALTH CARE EDUCATION/TRAINING PROGRAM

## 2023-09-07 PROCEDURE — 2580000003 HC RX 258: Performed by: SPECIALIST

## 2023-09-07 PROCEDURE — 6370000000 HC RX 637 (ALT 250 FOR IP)

## 2023-09-07 PROCEDURE — 6360000002 HC RX W HCPCS: Performed by: FAMILY MEDICINE

## 2023-09-07 PROCEDURE — 2500000003 HC RX 250 WO HCPCS

## 2023-09-07 PROCEDURE — 6360000002 HC RX W HCPCS

## 2023-09-07 PROCEDURE — 6360000002 HC RX W HCPCS: Performed by: NURSE PRACTITIONER

## 2023-09-07 PROCEDURE — 6360000002 HC RX W HCPCS: Performed by: SPECIALIST

## 2023-09-07 PROCEDURE — 82962 GLUCOSE BLOOD TEST: CPT

## 2023-09-07 PROCEDURE — 82803 BLOOD GASES ANY COMBINATION: CPT

## 2023-09-07 PROCEDURE — 85027 COMPLETE CBC AUTOMATED: CPT

## 2023-09-07 PROCEDURE — 36415 COLL VENOUS BLD VENIPUNCTURE: CPT

## 2023-09-07 PROCEDURE — 99233 SBSQ HOSP IP/OBS HIGH 50: CPT | Performed by: INTERNAL MEDICINE

## 2023-09-07 PROCEDURE — 99231 SBSQ HOSP IP/OBS SF/LOW 25: CPT | Performed by: INTERNAL MEDICINE

## 2023-09-07 PROCEDURE — 80053 COMPREHEN METABOLIC PANEL: CPT

## 2023-09-07 RX ORDER — FUROSEMIDE 10 MG/ML
20 INJECTION INTRAMUSCULAR; INTRAVENOUS ONCE
Status: COMPLETED | OUTPATIENT
Start: 2023-09-07 | End: 2023-09-07

## 2023-09-07 RX ORDER — LORAZEPAM 2 MG/ML
0.5 INJECTION INTRAMUSCULAR ONCE
Status: COMPLETED | OUTPATIENT
Start: 2023-09-07 | End: 2023-09-07

## 2023-09-07 RX ORDER — NICOTINE 21 MG/24HR
1 PATCH, TRANSDERMAL 24 HOURS TRANSDERMAL DAILY
Status: DISCONTINUED | OUTPATIENT
Start: 2023-09-07 | End: 2023-09-09 | Stop reason: HOSPADM

## 2023-09-07 RX ORDER — FUROSEMIDE 10 MG/ML
40 INJECTION INTRAMUSCULAR; INTRAVENOUS ONCE
Status: COMPLETED | OUTPATIENT
Start: 2023-09-07 | End: 2023-09-07

## 2023-09-07 RX ORDER — NALOXONE HYDROCHLORIDE 0.4 MG/ML
0.4 INJECTION, SOLUTION INTRAMUSCULAR; INTRAVENOUS; SUBCUTANEOUS PRN
Status: DISCONTINUED | OUTPATIENT
Start: 2023-09-07 | End: 2023-09-09 | Stop reason: HOSPADM

## 2023-09-07 RX ORDER — LIDOCAINE 40 MG/G
CREAM TOPICAL PRN
Status: DISCONTINUED | OUTPATIENT
Start: 2023-09-07 | End: 2023-09-09 | Stop reason: HOSPADM

## 2023-09-07 RX ORDER — METOPROLOL TARTRATE 5 MG/5ML
5 INJECTION INTRAVENOUS EVERY 8 HOURS
Status: DISCONTINUED | OUTPATIENT
Start: 2023-09-07 | End: 2023-09-07

## 2023-09-07 RX ORDER — METOPROLOL TARTRATE 5 MG/5ML
5 INJECTION INTRAVENOUS EVERY 6 HOURS
Status: DISCONTINUED | OUTPATIENT
Start: 2023-09-08 | End: 2023-09-08

## 2023-09-07 RX ORDER — IPRATROPIUM BROMIDE AND ALBUTEROL SULFATE 2.5; .5 MG/3ML; MG/3ML
1 SOLUTION RESPIRATORY (INHALATION) EVERY 4 HOURS PRN
Status: DISCONTINUED | OUTPATIENT
Start: 2023-09-07 | End: 2023-09-09 | Stop reason: HOSPADM

## 2023-09-07 RX ADMIN — FUROSEMIDE 20 MG: 10 INJECTION, SOLUTION INTRAMUSCULAR; INTRAVENOUS at 23:13

## 2023-09-07 RX ADMIN — DILTIAZEM HYDROCHLORIDE 60 MG: 60 TABLET, FILM COATED ORAL at 01:36

## 2023-09-07 RX ADMIN — IPRATROPIUM BROMIDE AND ALBUTEROL SULFATE 1 DOSE: 2.5; .5 SOLUTION RESPIRATORY (INHALATION) at 12:05

## 2023-09-07 RX ADMIN — LIDOCAINE: 40 CREAM TOPICAL at 20:41

## 2023-09-07 RX ADMIN — IRON SUCROSE 300 MG: 20 INJECTION, SOLUTION INTRAVENOUS at 18:06

## 2023-09-07 RX ADMIN — SODIUM CHLORIDE, PRESERVATIVE FREE 20 ML: 5 INJECTION INTRAVENOUS at 22:16

## 2023-09-07 RX ADMIN — NALOXONE HYDROCHLORIDE 0.4 MG: 0.4 INJECTION, SOLUTION INTRAMUSCULAR; INTRAVENOUS; SUBCUTANEOUS at 04:25

## 2023-09-07 RX ADMIN — SODIUM CHLORIDE 5 MG/HR: 900 INJECTION, SOLUTION INTRAVENOUS at 14:20

## 2023-09-07 RX ADMIN — FUROSEMIDE 40 MG: 10 INJECTION, SOLUTION INTRAMUSCULAR; INTRAVENOUS at 14:20

## 2023-09-07 RX ADMIN — SODIUM CHLORIDE 15 MG/HR: 900 INJECTION, SOLUTION INTRAVENOUS at 22:02

## 2023-09-07 RX ADMIN — IPRATROPIUM BROMIDE AND ALBUTEROL SULFATE 1 DOSE: 2.5; .5 SOLUTION RESPIRATORY (INHALATION) at 19:30

## 2023-09-07 RX ADMIN — IPRATROPIUM BROMIDE AND ALBUTEROL SULFATE 1 DOSE: 2.5; .5 SOLUTION RESPIRATORY (INHALATION) at 08:05

## 2023-09-07 RX ADMIN — METOPROLOL TARTRATE 5 MG: 1 INJECTION, SOLUTION INTRAVENOUS at 20:38

## 2023-09-07 RX ADMIN — IPRATROPIUM BROMIDE AND ALBUTEROL SULFATE 1 DOSE: 2.5; .5 SOLUTION RESPIRATORY (INHALATION) at 03:52

## 2023-09-07 RX ADMIN — INSULIN LISPRO 5 UNITS: 100 INJECTION, SOLUTION INTRAVENOUS; SUBCUTANEOUS at 08:22

## 2023-09-07 RX ADMIN — ENOXAPARIN SODIUM 30 MG: 100 INJECTION SUBCUTANEOUS at 08:23

## 2023-09-07 RX ADMIN — IPRATROPIUM BROMIDE AND ALBUTEROL SULFATE 1 DOSE: 2.5; .5 SOLUTION RESPIRATORY (INHALATION) at 04:50

## 2023-09-07 RX ADMIN — CEFEPIME 1000 MG: 1 INJECTION, POWDER, FOR SOLUTION INTRAMUSCULAR; INTRAVENOUS at 10:31

## 2023-09-07 RX ADMIN — LORAZEPAM 0.5 MG: 2 INJECTION INTRAMUSCULAR; INTRAVENOUS at 01:31

## 2023-09-07 RX ADMIN — INSULIN GLARGINE 8 UNITS: 100 INJECTION, SOLUTION SUBCUTANEOUS at 08:23

## 2023-09-07 RX ADMIN — INSULIN LISPRO 4 UNITS: 100 INJECTION, SOLUTION INTRAVENOUS; SUBCUTANEOUS at 12:14

## 2023-09-07 ASSESSMENT — PAIN SCALES - GENERAL: PAINLEVEL_OUTOF10: 7

## 2023-09-07 ASSESSMENT — PAIN DESCRIPTION - ORIENTATION: ORIENTATION: MID

## 2023-09-07 ASSESSMENT — PAIN DESCRIPTION - LOCATION: LOCATION: CHEST

## 2023-09-07 ASSESSMENT — PAIN DESCRIPTION - DESCRIPTORS: DESCRIPTORS: ACHING

## 2023-09-07 ASSESSMENT — PAIN - FUNCTIONAL ASSESSMENT: PAIN_FUNCTIONAL_ASSESSMENT: ACTIVITIES ARE NOT PREVENTED

## 2023-09-07 NOTE — PROGRESS NOTES
ONCOLOGY HEMATOLOGY  Progress REPORT    2023  8:28 AM    Patient:    Carmen Smith  :    74 y.o. MRN: [id number]  Admitted: 2023 10:35 AM ATT: Dylan Shabazz MD   -A  AdmitDx: Elevated troponin [R77.8]  Acute respiratory failure with hypoxia ([address]) [J96.01]  Pneumonia of right lower lobe due to infectious organism [J18.9]  Pneumonia, unspecified organism [J18.9]  Acute kidney injury superimposed on CKD ([address]) [N17.9, N18.9]  Sepsis with acute hypoxic respiratory failure without septic shock, due to unspecified organism ([address]) [A41.9, R65.20, J96.01]  PCP: No primary care provider on file. Requesting Physician:  Dylan Shabazz MD      History Obtained From:  Patient and review of all records      CHIEF COMPLAINT    Chief Complaint   Patient presents with    Abdominal Pain       HISTORY OF PRESENT ILLNESS   Carmen Smith is a 74 y.o. male who was admitted to [facility] sec to generalized weakness, hyperglycemia, poor po intake, productive cough consisting of yellow sputum and maybe hemoptysis, more sob. Also left sided abdominal pain, right shoulder pain. Also not able to move. 23: Retroperitoneal ultrasound:IMPRESSION:  Increased cortical echogenicity of both kidneys suggesting chronic medical  renal disease. No hydronephrosis. 5 mm nonobstructing left renal stone. 23: CTA chest, abd and pelvis:IMPRESSION:  Moderate right lower lung airspace disease compatible with pneumonia. Right  lobectomy. Moderate COPD greater on the right. Stable 4.0 x 3.3 cm infrarenal abdominal aortic aneurysm. Follow-up  examination in 1 year is recommended. Vascular consultation is recommended. The thoracic and abdominal aorta are not opacified with contrast due to the  phase of imaging. Mild pulmonary artery enlargement compatible with pulmonary hypertension. Stable 1 cm lucent lesion within the right side of the L3 vertebral body may  represent small myeloma lesion.

## 2023-09-07 NOTE — PROGRESS NOTES
Received correspondence from [facility] regarding oral chemo. Revlimid (lenalidomide) Patient Prescription Form - Department [facility] ([place] Only completed and faxed back to [phone] as instructed.

## 2023-09-07 NOTE — PROGRESS NOTES
Nephrology Progress Note        [address], 9[address] [facility], [address] [place] [address]  Phone: [phone]  Office Hours: 8:30AM - 4:30PM  Monday - Friday 9/7/2023 6:48 AM  Subjective:   Admit Date: 9/2/2023  PCP: No primary care provider on file. Interval History: on bipap    Diet: ADULT DIET; Regular; 5 carb choices (75 gm/meal)  ADULT ORAL NUTRITION SUPPLEMENT; Breakfast, Lunch, Dinner; Diabetic Oral Supplement      Data:   Scheduled Meds:   dilTIAZem  60 mg Oral 4 times per day    citric acid-sodium citrate  30 mL Oral TID    iron sucrose  300 mg IntraVENous Q24H    metoprolol tartrate  50 mg Oral BID    sodium zirconium cyclosilicate  10 g Oral Daily    megestrol  200 mg Oral Daily    allopurinol  300 mg Oral Daily    aspirin  81 mg Oral Daily    atorvastatin  40 mg Oral Nightly    ferrous gluconate  324 mg Oral Daily with breakfast    folic acid  1 mg Oral Daily    insulin glargine  8 Units SubCUTAneous QAM    pantoprazole  40 mg Oral QAM AC    cyanocobalamin  1,000 mcg Oral Daily    sodium chloride flush  5-40 mL IntraVENous 2 times per day    enoxaparin  30 mg SubCUTAneous Daily    ipratropium 0.5 mg-albuterol 2.5 mg  1 Dose Inhalation Q4H WA RT    guaiFENesin  600 mg Oral BID    cefepime  1,000 mg IntraVENous Q12H    insulin lispro  0-8 Units SubCUTAneous TID WC    insulin lispro  0-4 Units SubCUTAneous Nightly     Continuous Infusions:   sodium chloride 25 mL (09/03/23 2159)    dextrose       PRN Meds:ipratropium 0.5 mg-albuterol 2.5 mg, naloxone, HYDROmorphone, sodium chloride flush, sodium chloride, ondansetron **OR** ondansetron, polyethylene glycol, acetaminophen **OR** acetaminophen, glucose, dextrose bolus **OR** dextrose bolus, glucagon (rDNA), dextrose  I/O last 3 completed shifts:   In: 61 [P.O.:60]  Out: 450 [Urine:450]  I/O this shift:  In: -   Out: 550 [Urine:550]    Intake/Output Summary (Last 24 hours) at 9/7/2023 0648  Last data filed at 9/7/2023 0500  Gross per 24 hour

## 2023-09-07 NOTE — PROGRESS NOTES
Pt complaining of some chest pain. Contacted Dr Caleb Denny regarding administration of dilaudid. Dr Caleb Denny put in order for lidocaine and ekg. Stated to hold off on dilaudid and update cardiology. This was completed.

## 2023-09-07 NOTE — PROGRESS NOTES
09/07/23 1532   NIV Type   NIV Started/Stopped On   Equipment Type v60   Mode Bilevel   Mask Type Full face mask   Mask Size Large   Assessment   Heart Rate Source Monitor   SpO2 100 %   Level of Consciousness 1   Comfort Level Good   Using Accessory Muscles No   Mask Compliance Good   Breath Sounds   Breath Sounds Bilateral Diminished   Settings/Measurements   IPAP 20 cmH20   CPAP/EPAP 5 cmH2O   Vt (Measured) 498 mL   Rate Ordered 12   Insp Rise Time (%) 3 %   FiO2  50 %   I Time/ I Time % 1.25 s   Minute Volume (L/min) 10.4 Liters   Mask Leak (lpm) 101 lpm   Patient's Home Machine No   Alarm Settings   Alarms On Y   Low Pressure (cmH2O) 5 cmH2O   High Pressure (cmH2O) 25 cmH2O   Delay Alarm 20 sec(s)   Apnea (secs) 20 secs   RR Low (bpm) 13   RR High (bpm) 40 br/min

## 2023-09-07 NOTE — PROGRESS NOTES
Occupational Therapy  OT/PT attempted to see pt this AM for evaluation. Per chart review and discussion w/ RN Mary, pt is inappropriate for therapy today due to BiPap and unstable HR. RN requesting therapy reattempt to see pt tomorrow. Discussed w/ SHELLY Haskins and will reattempt as pt is medially appropriate.      Rafiq Hsu OTR/MARITZA [id number]  9/7/2023     9:45 AM

## 2023-09-07 NOTE — PROGRESS NOTES
[facility]  [address] [place] [address], [address] [facility]  Phone: [phone]    Fax [phone]                  Burgess Joi MD, Armida Jackson MD, Caren Jack MD, Charline Earl, MD Michalene Harness, MD Vernona Parkins, MD Gerianne Gust, MD Dr. Harriette Scheuermann MD Rivka Hamel, ADEEL Haas, ADEEL Barnard, ADEEL Magallanes, ADEEL Peraza PA-C    CARDIOLOGY  NOTE      Name:  Clara Corea /Age/Sex:   (74 y.o. male)   MRN & CSN:  [id number] & [id number] Admission Date/Time: 2023 10:35 AM   Location:  -A PCP: No primary care provider on file. Hospital Day: 6    - Cardiology consult is for: Atrial fibrillation with rapid ventricular response      ASSESSMENT/ PLAN:  New onset atrial fibrillation with rapid ventricular response  -Rate currently uncontrolled  -[id number]: 2  -Echo showing preserved EF, no wall motion abnormalities. Mild MR and moderate Left Atrium dilation  -Oral medications on hold. -Initiate on IV Cardizem   -Patient is high risk for oral anticoagulant given multiple myeloma and recent GI bleeding. Will reach out to Oncology and GI about safety of oral anticoagulant  -Unable to perform WILL cardioversion due to oxygen requirement. Will continue medical management, can reconsider if hypoxia improves  -Continue aspirin alone for now  Acute heart failure with preserved ejection fraction  -Strict I's and O's and daily weights  -BNP elevated, however no motion edema noted  -CXR showing some pulmonary edema  -Give IV Lasix 40 mg x 1.   Evaluate to see if any improvement  DORCAS on CKD 3  -Nephrology following  Acute respiratory failure with Sepsis secondary to pneumonia  COPD  -Per primary care  -On BiPAP  Anemia:  monitor for worsening anemia, Hgb 7.5  Hyperlipidemia  -Continue Lipitor 40 mg nightly  History of AAA: 4 x 3.3 cm infrarenal.  continue intubation. We will continue with metoprolol but his rate control agents may need to be held because of hypotension. Overall this patient is critically ill with multiple organ dysfunction. HPI:  I have reviewed the HPI  And agree with above   Ava Avila is a 74 y. o.year old who and presents with had concerns including Abdominal Pain. Chief Complaint   Patient presents with    Abdominal Pain     Please review addendum/changes made to note above   Interval history: Overnight patient remains tachycardic. His breathing status has worsened and he is currently on BiPAP. Physical Exam:  General:  Awake, alert, NAD  Head:normal  Eye:normal  Neck:  No JVD   Chest: Bilateral rhonchi cardiovascular: Regular pulse   Abdomen:   nontender  Extremities: No edema  Pulses; palpable  Neuro: grossly normal        I agree with the plan, which was planned by myself and discussed with advanced level provider. My documented MDM is a substantive portion of the supervisory note. I have seen ,spoken to  and examined this patient personally, independently of the advanced level provider. I have spent substantiate  portion of this encounter independently myself in examining patient and developing the medical management plan . I have reviewed the hospital care given to date and reviewed all pertinent labs and imaging. The plan was developed mutually at the time of the visit with the patient,  NP /PA  and myself. I have spoken with patient, nursing staff and provided written and verbal instructions . The above note has been reviewed and I agree with the assessment, diagnosis, and treatment plan with changes made by me as follows .     Bart Tenorio MD

## 2023-09-07 NOTE — PROGRESS NOTES
Comprehensive Nutrition Assessment    Type and Reason for Visit:  Reassess    Nutrition Recommendations/Plan:   Continue current diet and supplements     Malnutrition Assessment:  Malnutrition Status:  Severe malnutrition (09/04/23 1508)    Context:  Chronic Illness     Findings of the 6 clinical characteristics of malnutrition:  Energy Intake:  75% or less estimated energy requirements for 1 month or longer  Weight Loss:  Greater than 7.5% over 3 months (15% x4mo)     Body Fat Loss:  Severe body fat loss Triceps, Orbital, Buccal region   Muscle Mass Loss:  Severe muscle mass loss Clavicles (pectoralis & deltoids), Thigh (quadraceps), Temples (temporalis)  Fluid Accumulation:  No significant fluid accumulation     Strength:  Not Performed    Nutrition Assessment:    Pt medical status is deteriating and he is not able to breath as well. This likely contributes to his intake being at 1-25%. Pt is DNR and daughter is having conversations with MD about goals of care. At this time, no additional nutritional intervention is suggested. Will continue to follow    Nutrition Related Findings:    . Wound Type: None       Current Nutrition Intake & Therapies:    Average Meal Intake: 1-25%  Average Supplements Intake: Unable to assess  ADULT DIET; Regular; 5 carb choices (75 gm/meal)  ADULT ORAL NUTRITION SUPPLEMENT; Breakfast, Lunch, Dinner; Diabetic Oral Supplement    Anthropometric Measures:  Height: 5' 9\" (175.3 cm)  Ideal Body Weight (IBW): 160 lbs (73 kg)    Admission Body Weight: 140 lb (63.5 kg) (?)  Current Body Weight: 119 lb 4.3 oz (54.1 kg), 74.5 % IBW.  Weight Source: Bed Scale  Current BMI (kg/m2): 17.6  Usual Body Weight: 140 lb (63.5 kg) (5/3/23)  % Weight Change (Calculated): -14.8  Weight Adjustment For: No Adjustment                 BMI Categories: Underweight (BMI less than 22) age over 72    Estimated Daily Nutrient Needs:  Energy Requirements Based On: Kcal/kg  Weight Used for Energy Requirements:

## 2023-09-07 NOTE — PROGRESS NOTES
V2.0  [facility] Hospitalist Progress Note      Name:  Maggy Alas /Age/Sex:   (74 y.o. male)   MRN & CSN:  [id number] & [id number] Encounter Date/Time: 2023 12:55 PM EDT    Location:  [address] [place] [address]- PCP: No primary care provider on file. Hospital Day: 6    Assessment and Plan:   Maggy Alas is a 74 y.o. male with pmh of recent Dx of MM, getting eval for large colon polyp that needs t be taken care of at [facility] who presents with Pneumonia, unspecified organism      Plan:    Severe sepsis 2/2 pneumonia  Acute hypoxic respiratory failure 2/2 pneumonia  History of COPD  History of lung cancer -s/p resection, no systemic treatment  -Pt recently discharged from hospital.  Presents with SOB, hypoxic on arrival requiring 3 L oxygen (no baseline oxygen requirement). -Procalcitonin 2.23 -> 1.93,   -Lactic acid 2.7 (normalized). Rapid flu and COVID-negative.  -Resp panel neg  -CTA chest/abdomen/pelvis done in ED shows moderate right lower lobe airspace disease concerning for pneumonia, right lobectomy  -S/p sepsis fluids, continue maintenance IVF  -Initiated vancomycin/cefepime, follow infectious/inflammatory work-up  -Bcx NTD  -Respiratory status decompensated overnight, patient started on BiPAP, near maximal settings this morning. Family contacted, I personally spoke to patient's son Janine Girard and Eusebio Ngo and provided updates about the patient. Both sons informed me that patient had expressed multiple times to his children that he does not wish to be intubated/resuscitated, does not wish to be dependent on machines and ventilators and does not want to end up in a nursing home at all. After confirming patient's wishes from family CODE STATUS changed in chart to DNR CC. We will continue to monitor patient over the next 24 hours. Anticipate very guarded prognosis.     New onset Afib  -Noted on EKG, repeat, maintain telemetry  -Will not anticoagulate as very recent hx of concern for GIB 75 - 100 MMHG    Base Excess 7 (H) 0 - 3.3    HCO3, Arterial 25.2 (H) 18 - 23 MMOL/L    CO2 Content 27.8 (H) 19 - 24 MMOL/L    O2 Sat 94.2 (L) 96 - 97 %    Carbon Monoxide, Blood 2.0 0 - 5 %    Methemoglobin, Arterial 1.3 <1.5 %    Comment 12LHFNC    Blood Gas, Arterial    Collection Time: 09/07/23  5:15 AM   Result Value Ref Range    pH, Bld 7.24 (L) 7.34 - 7.45    pCO2, Arterial 55.0 (H) 32 - 45 MMHG    pO2, Arterial 62 (L) 75 - 100 MMHG    Base Excess 5 (H) 0 - 3.3    HCO3, Arterial 23.6 (H) 18 - 23 MMOL/L    CO2 Content 25.3 (H) 19 - 24 MMOL/L    O2 Sat 88.8 (L) 96 - 97 %    Carbon Monoxide, Blood 2.3 0 - 5 %    Methemoglobin, Arterial 1.0 <1.5 %    Comment BIPAP 20/5 60%    Procalcitonin    Collection Time: 09/07/23  5:21 AM   Result Value Ref Range    Procalcitonin 0.565    CBC    Collection Time: 09/07/23  5:21 AM   Result Value Ref Range    WBC 9.2 4.0 - 10.5 K/CU MM    RBC 2.29 (L) 4.6 - 6.2 M/CU MM    Hemoglobin 7.4 (L) 13.5 - 18.0 GM/DL    Hematocrit 26.2 (L) 42 - 52 %    .4 (H) 78 - 100 FL    MCH 32.3 (H) 27 - 31 PG    MCHC 28.2 (L) 32.0 - 36.0 %    RDW 17.5 (H) 11.7 - 14.9 %    Platelets 618 761 - 943 K/CU MM    MPV 10.1 7.5 - 11.1 FL   Comprehensive Metabolic Panel w/ Reflex to MG    Collection Time: 09/07/23  5:21 AM   Result Value Ref Range    Sodium 142 135 - 145 MMOL/L    Potassium 3.8 3.5 - 5.1 MMOL/L    Chloride 107 99 - 110 mMol/L    CO2 18 (L) 21 - 32 MMOL/L    BUN 56 (H) 6 - 23 MG/DL    Creatinine 2.3 (H) 0.9 - 1.3 MG/DL    Est, Glom Filt Rate 29 (L) >60 mL/min/1.73m2    Glucose 258 (H) 70 - 99 MG/DL    Calcium 9.7 8.3 - 10.6 MG/DL    Albumin 2.8 (L) 3.4 - 5.0 GM/DL    Total Protein 7.7 6.4 - 8.2 GM/DL    Total Bilirubin 0.2 0.0 - 1.0 MG/DL    ALT 12 10 - 40 U/L    AST 19 15 - 37 IU/L    Alkaline Phosphatase 160 (H) 40 - 128 IU/L    Anion Gap 17 (H) 4 - 16   POCT Glucose    Collection Time: 09/07/23  8:13 AM   Result Value Ref Range    POC Glucose 294 (H) 70 - 99 MG/DL

## 2023-09-07 NOTE — PLAN OF CARE

## 2023-09-07 NOTE — PROGRESS NOTES
Resource RN rounded on pt around 0300 discussed care with Bedside RN Rupa Keane. Dilaudid 0.5mg IV given @2057. Pt was in Afib RVR and received po cardizem 60 mg @0136. Ativan 0.5mg IV was given @0131. Pt sleeping and no concerns per bedside RN Rupa Keane. RRT called @0411 d/t change in LOC and guppy breathing. BiPAP in place already when this RN arrived to bedside. Pt minimally responsive. Joanna Rolon assessed pt and ordered Narcan 0.4 mg IV instead of flumazenil d/t adverse side effects of benzo reversal. ABG showed pCO2 of 85. BiPAP remained in place. 5- this RN returned to bedside VSS with BiPAP in place 20/12 and 70% FIO2. Pt is alert with intermittent restlessness and Sitter is now in place to ensure pt safety. Repeat ABG @0515 showed improvement of pCO2 @55.

## 2023-09-07 NOTE — PROGRESS NOTES
09/07/23 0818   NIV Type   NIV Started/Stopped On   Equipment Type v60   Mode Bilevel   Mask Type Full face mask   Mask Size Large   Assessment   Heart Rate Source Monitor   Respirations 28   SpO2 94 %   Level of Consciousness 1   Comfort Level Good   Using Accessory Muscles No   Mask Compliance Fair   Breath Sounds   Breath Sounds Bilateral Diminished   Settings/Measurements   IPAP 20 cmH20   CPAP/EPAP 5 cmH2O   Vt (Measured) 404 mL   Rate Ordered 12   Insp Rise Time (%) 3 %   FiO2  70 %   I Time/ I Time % 1.25 s   Minute Volume (L/min) 9.5 Liters   Mask Leak (lpm) 104 lpm   Patient's Home Machine No   Alarm Settings   Alarms On Y   Low Pressure (cmH2O) 5 cmH2O   High Pressure (cmH2O) 25 cmH2O   Delay Alarm 20 sec(s)   Apnea (secs) 20 secs   RR Low (bpm) 13   RR High (bpm) 40 br/min

## 2023-09-07 NOTE — PROGRESS NOTES
@ 0411 RRT called for unresponsiveness, guppy breathing, , VSS, HR 80's-120's Afib baseline, 12 L High Flow nasal canula sat 96%. Pt was agitated, climbing out of bed, refusing to take medications and wanting nursing staff to call family immediately. One time dose 0.5 mg IV ativan given per order. Between the ativan and increased O2 demand pt was placed on Bipap. This RN update patient's daughter. RN will continue to monitor closely.

## 2023-09-08 VITALS
SYSTOLIC BLOOD PRESSURE: 128 MMHG | HEIGHT: 69 IN | RESPIRATION RATE: 23 BRPM | DIASTOLIC BLOOD PRESSURE: 71 MMHG | BODY MASS INDEX: 21.09 KG/M2 | TEMPERATURE: 97.9 F | OXYGEN SATURATION: 95 % | WEIGHT: 142.42 LBS | HEART RATE: 125 BPM

## 2023-09-08 LAB
ANION GAP SERPL CALCULATED.3IONS-SCNC: 15 MMOL/L (ref 4–16)
BASOPHILS ABSOLUTE: 0 K/CU MM
BASOPHILS RELATIVE PERCENT: 0.1 % (ref 0–1)
BUN SERPL-MCNC: 57 MG/DL (ref 6–23)
CALCIUM SERPL-MCNC: 10.1 MG/DL (ref 8.3–10.6)
CHLORIDE BLD-SCNC: 108 MMOL/L (ref 99–110)
CO2: 19 MMOL/L (ref 21–32)
CREAT SERPL-MCNC: 2.7 MG/DL (ref 0.9–1.3)
DIFFERENTIAL TYPE: ABNORMAL
EKG ATRIAL RATE: 144 BPM
EKG DIAGNOSIS: NORMAL
EKG Q-T INTERVAL: 328 MS
EKG QRS DURATION: 86 MS
EKG QTC CALCULATION (BAZETT): 489 MS
EKG R AXIS: 75 DEGREES
EKG T AXIS: 242 DEGREES
EKG VENTRICULAR RATE: 134 BPM
EOSINOPHILS ABSOLUTE: 0.1 K/CU MM
EOSINOPHILS RELATIVE PERCENT: 1 % (ref 0–3)
GFR SERPL CREATININE-BSD FRML MDRD: 24 ML/MIN/1.73M2
GLUCOSE BLD-MCNC: 198 MG/DL (ref 70–99)
GLUCOSE BLD-MCNC: 274 MG/DL (ref 70–99)
GLUCOSE BLD-MCNC: 335 MG/DL (ref 70–99)
GLUCOSE BLD-MCNC: 91 MG/DL (ref 70–99)
GLUCOSE SERPL-MCNC: 62 MG/DL (ref 70–99)
HCT VFR BLD CALC: 22.7 % (ref 42–52)
HCT VFR BLD CALC: 27.9 % (ref 42–52)
HEMOGLOBIN: 6.7 GM/DL (ref 13.5–18)
HEMOGLOBIN: 8.4 GM/DL (ref 13.5–18)
IMMATURE NEUTROPHIL %: 1 % (ref 0–0.43)
LYMPHOCYTES ABSOLUTE: 0.9 K/CU MM
LYMPHOCYTES RELATIVE PERCENT: 9.6 % (ref 24–44)
MCH RBC QN AUTO: 32.5 PG (ref 27–31)
MCHC RBC AUTO-ENTMCNC: 29.5 % (ref 32–36)
MCV RBC AUTO: 110.2 FL (ref 78–100)
MONOCYTES ABSOLUTE: 0.7 K/CU MM
MONOCYTES RELATIVE PERCENT: 8.4 % (ref 0–4)
NUCLEATED RBC %: 0 %
PDW BLD-RTO: 17.7 % (ref 11.7–14.9)
PLATELET # BLD: 238 K/CU MM (ref 140–440)
PMV BLD AUTO: 10.5 FL (ref 7.5–11.1)
POTASSIUM SERPL-SCNC: 3.4 MMOL/L (ref 3.5–5.1)
PROCALCITONIN SERPL-MCNC: 0.6 NG/ML
RBC # BLD: 2.06 M/CU MM (ref 4.6–6.2)
SEGMENTED NEUTROPHILS ABSOLUTE COUNT: 7 K/CU MM
SEGMENTED NEUTROPHILS RELATIVE PERCENT: 79.9 % (ref 36–66)
SODIUM BLD-SCNC: 142 MMOL/L (ref 135–145)
TOTAL IMMATURE NEUTOROPHIL: 0.09 K/CU MM
TOTAL NUCLEATED RBC: 0 K/CU MM
WBC # BLD: 8.8 K/CU MM (ref 4–10.5)

## 2023-09-08 PROCEDURE — 2060000000 HC ICU INTERMEDIATE R&B

## 2023-09-08 PROCEDURE — 6370000000 HC RX 637 (ALT 250 FOR IP): Performed by: STUDENT IN AN ORGANIZED HEALTH CARE EDUCATION/TRAINING PROGRAM

## 2023-09-08 PROCEDURE — 2580000003 HC RX 258: Performed by: STUDENT IN AN ORGANIZED HEALTH CARE EDUCATION/TRAINING PROGRAM

## 2023-09-08 PROCEDURE — 85014 HEMATOCRIT: CPT

## 2023-09-08 PROCEDURE — 86901 BLOOD TYPING SEROLOGIC RH(D): CPT

## 2023-09-08 PROCEDURE — 99233 SBSQ HOSP IP/OBS HIGH 50: CPT | Performed by: INTERNAL MEDICINE

## 2023-09-08 PROCEDURE — 85018 HEMOGLOBIN: CPT

## 2023-09-08 PROCEDURE — 80048 BASIC METABOLIC PNL TOTAL CA: CPT

## 2023-09-08 PROCEDURE — 6370000000 HC RX 637 (ALT 250 FOR IP): Performed by: INTERNAL MEDICINE

## 2023-09-08 PROCEDURE — 82962 GLUCOSE BLOOD TEST: CPT

## 2023-09-08 PROCEDURE — 2500000003 HC RX 250 WO HCPCS

## 2023-09-08 PROCEDURE — 2580000003 HC RX 258

## 2023-09-08 PROCEDURE — 94640 AIRWAY INHALATION TREATMENT: CPT

## 2023-09-08 PROCEDURE — 94761 N-INVAS EAR/PLS OXIMETRY MLT: CPT

## 2023-09-08 PROCEDURE — P9016 RBC LEUKOCYTES REDUCED: HCPCS

## 2023-09-08 PROCEDURE — 86900 BLOOD TYPING SEROLOGIC ABO: CPT

## 2023-09-08 PROCEDURE — 2700000000 HC OXYGEN THERAPY PER DAY

## 2023-09-08 PROCEDURE — 30233N1 TRANSFUSION OF NONAUTOLOGOUS RED BLOOD CELLS INTO PERIPHERAL VEIN, PERCUTANEOUS APPROACH: ICD-10-PCS | Performed by: STUDENT IN AN ORGANIZED HEALTH CARE EDUCATION/TRAINING PROGRAM

## 2023-09-08 PROCEDURE — 51798 US URINE CAPACITY MEASURE: CPT

## 2023-09-08 PROCEDURE — 86850 RBC ANTIBODY SCREEN: CPT

## 2023-09-08 PROCEDURE — 85025 COMPLETE CBC W/AUTO DIFF WBC: CPT

## 2023-09-08 PROCEDURE — 84145 PROCALCITONIN (PCT): CPT

## 2023-09-08 PROCEDURE — 6360000002 HC RX W HCPCS: Performed by: STUDENT IN AN ORGANIZED HEALTH CARE EDUCATION/TRAINING PROGRAM

## 2023-09-08 PROCEDURE — APPSS30 APP SPLIT SHARED TIME 16-30 MINUTES

## 2023-09-08 PROCEDURE — 86922 COMPATIBILITY TEST ANTIGLOB: CPT

## 2023-09-08 PROCEDURE — 2500000003 HC RX 250 WO HCPCS: Performed by: NURSE PRACTITIONER

## 2023-09-08 PROCEDURE — 93010 ELECTROCARDIOGRAM REPORT: CPT | Performed by: INTERNAL MEDICINE

## 2023-09-08 PROCEDURE — 36430 TRANSFUSION BLD/BLD COMPNT: CPT

## 2023-09-08 PROCEDURE — 6360000002 HC RX W HCPCS

## 2023-09-08 RX ORDER — SODIUM BICARBONATE 650 MG/1
1300 TABLET ORAL 3 TIMES DAILY
Status: DISCONTINUED | OUTPATIENT
Start: 2023-09-08 | End: 2023-09-08

## 2023-09-08 RX ORDER — LANOLIN ALCOHOL/MO/W.PET/CERES
6 CREAM (GRAM) TOPICAL NIGHTLY PRN
Status: DISCONTINUED | OUTPATIENT
Start: 2023-09-08 | End: 2023-09-09 | Stop reason: HOSPADM

## 2023-09-08 RX ORDER — FUROSEMIDE 10 MG/ML
20 INJECTION INTRAMUSCULAR; INTRAVENOUS ONCE
Status: COMPLETED | OUTPATIENT
Start: 2023-09-08 | End: 2023-09-08

## 2023-09-08 RX ORDER — SODIUM CHLORIDE 9 MG/ML
INJECTION, SOLUTION INTRAVENOUS PRN
Status: DISCONTINUED | OUTPATIENT
Start: 2023-09-08 | End: 2023-09-09 | Stop reason: HOSPADM

## 2023-09-08 RX ORDER — CITRIC ACID/SODIUM CITRATE 334-500MG
30 SOLUTION, ORAL ORAL 4 TIMES DAILY
Status: DISCONTINUED | OUTPATIENT
Start: 2023-09-08 | End: 2023-09-09 | Stop reason: HOSPADM

## 2023-09-08 RX ORDER — POTASSIUM CHLORIDE 20 MEQ/1
40 TABLET, EXTENDED RELEASE ORAL ONCE
Status: DISCONTINUED | OUTPATIENT
Start: 2023-09-08 | End: 2023-09-08

## 2023-09-08 RX ADMIN — SODIUM CHLORIDE 15 MG/HR: 900 INJECTION, SOLUTION INTRAVENOUS at 03:49

## 2023-09-08 RX ADMIN — FOLIC ACID 1 MG: 1 TABLET ORAL at 08:38

## 2023-09-08 RX ADMIN — FERROUS GLUCONATE 324 MG: 324 TABLET ORAL at 08:44

## 2023-09-08 RX ADMIN — INSULIN LISPRO 6 UNITS: 100 INJECTION, SOLUTION INTRAVENOUS; SUBCUTANEOUS at 16:49

## 2023-09-08 RX ADMIN — CEFEPIME 1000 MG: 1 INJECTION, POWDER, FOR SOLUTION INTRAMUSCULAR; INTRAVENOUS at 02:21

## 2023-09-08 RX ADMIN — IPRATROPIUM BROMIDE AND ALBUTEROL SULFATE 1 DOSE: 2.5; .5 SOLUTION RESPIRATORY (INHALATION) at 07:37

## 2023-09-08 RX ADMIN — GUAIFENESIN 600 MG: 600 TABLET, EXTENDED RELEASE ORAL at 08:38

## 2023-09-08 RX ADMIN — PANTOPRAZOLE SODIUM 40 MG: 40 TABLET, DELAYED RELEASE ORAL at 08:40

## 2023-09-08 RX ADMIN — CYANOCOBALAMIN TAB 1000 MCG 1000 MCG: 1000 TAB at 08:40

## 2023-09-08 RX ADMIN — ALLOPURINOL 300 MG: 100 TABLET ORAL at 08:35

## 2023-09-08 RX ADMIN — SODIUM CHLORIDE 15 MG/HR: 900 INJECTION, SOLUTION INTRAVENOUS at 09:44

## 2023-09-08 RX ADMIN — METOPROLOL TARTRATE 5 MG: 5 INJECTION, SOLUTION INTRAVENOUS at 14:32

## 2023-09-08 RX ADMIN — SODIUM CITRATE AND CITRIC ACID MONOHYDRATE 30 ML: 500; 334 SOLUTION ORAL at 13:30

## 2023-09-08 RX ADMIN — SODIUM CHLORIDE 15 MG/HR: 900 INJECTION, SOLUTION INTRAVENOUS at 16:22

## 2023-09-08 RX ADMIN — METOPROLOL TARTRATE 5 MG: 5 INJECTION, SOLUTION INTRAVENOUS at 02:16

## 2023-09-08 RX ADMIN — ENOXAPARIN SODIUM 30 MG: 100 INJECTION SUBCUTANEOUS at 08:37

## 2023-09-08 RX ADMIN — FUROSEMIDE 20 MG: 20 INJECTION, SOLUTION INTRAMUSCULAR; INTRAVENOUS at 16:49

## 2023-09-08 RX ADMIN — CEFEPIME 1000 MG: 1 INJECTION, POWDER, FOR SOLUTION INTRAMUSCULAR; INTRAVENOUS at 14:39

## 2023-09-08 RX ADMIN — METOPROLOL TARTRATE 5 MG: 5 INJECTION, SOLUTION INTRAVENOUS at 08:39

## 2023-09-08 RX ADMIN — MEGESTROL ACETATE 200 MG: 40 SUSPENSION ORAL at 08:45

## 2023-09-08 RX ADMIN — SODIUM CITRATE AND CITRIC ACID MONOHYDRATE 30 ML: 500; 334 SOLUTION ORAL at 08:43

## 2023-09-08 RX ADMIN — SODIUM CITRATE AND CITRIC ACID MONOHYDRATE 30 ML: 500; 334 SOLUTION ORAL at 16:49

## 2023-09-08 RX ADMIN — IPRATROPIUM BROMIDE AND ALBUTEROL SULFATE 1 DOSE: 2.5; .5 SOLUTION RESPIRATORY (INHALATION) at 11:09

## 2023-09-08 RX ADMIN — ASPIRIN 81 MG CHEWABLE TABLET 81 MG: 81 TABLET CHEWABLE at 08:37

## 2023-09-08 RX ADMIN — IPRATROPIUM BROMIDE AND ALBUTEROL SULFATE 1 DOSE: 2.5; .5 SOLUTION RESPIRATORY (INHALATION) at 15:41

## 2023-09-08 RX ADMIN — POTASSIUM BICARBONATE 40 MEQ: 782 TABLET, EFFERVESCENT ORAL at 08:41

## 2023-09-08 ASSESSMENT — ENCOUNTER SYMPTOMS
CONSTIPATION: 0
BACK PAIN: 0
DIARRHEA: 0
ABDOMINAL DISTENTION: 0
EYE DISCHARGE: 0
NAUSEA: 0
SHORTNESS OF BREATH: 1
WHEEZING: 0
SORE THROAT: 0
COUGH: 0

## 2023-09-08 NOTE — PROGRESS NOTES
[place]2.[place]  [facility] [place]      Name:  Giovanni Miranda /Age/Sex:   (74 y.o. male)   MRN & CSN:  [id number] & [id number] Encounter Date/Time: 2023 12:55 PM EDT    Location:  [address] [place] [address] PCP: No primary care provider on file. Hospital Day: 7    Assessment and Plan:   Giovanni Miranda is a 74 y.o. male with pmh of recent Dx of MM, getting eval for large colon polyp that needs t be taken care of at [facility] who presents with Pneumonia, unspecified organism      Plan:    Severe sepsis 2/2 pneumonia  Acute hypoxic respiratory failure 2/2 pneumonia  History of COPD  History of lung cancer -s/p resection, no systemic treatment  -Pt recently discharged from hospital.  Presents with SOB, hypoxic on arrival requiring 3 L oxygen (no baseline oxygen requirement). -Procalcitonin 2.23 -> 1.93,   -Lactic acid 2.7 (normalized). Rapid flu and COVID-negative.  -Resp panel neg  -CTA chest/abdomen/pelvis done in ED shows moderate right lower lobe airspace disease concerning for pneumonia, right lobectomy  -S/p sepsis fluids, continue maintenance IVF  -Initiated vancomycin/cefepime, follow infectious/inflammatory work-up  -Bcx NTD  -Respiratory status decompensated overnight, patient started on BiPAP, near maximal settings this morning. Family contacted, I personally spoke to patient's son Sagar Dowd and Mandeep Leonard and provided updates about the patient. Both sons informed me that patient had expressed multiple times to his children that he does not wish to be intubated/resuscitated, does not wish to be dependent on machines and ventilators and does not want to end up in a nursing home at all. After confirming patient's wishes from family CODE STATUS changed in chart to DNR CC. We will continue to monitor patient   Anticipate very guarded prognosis.   -Patient's respiratory status improved today, was able to be transition from BiPAP to Vapotherm, wean as tolerated    New onset Afib  -Noted on EKG, repeat,

## 2023-09-08 NOTE — PROGRESS NOTES
Physical/Occupational Therapy  Attempted to see pt this AM but RN requesting hold due to low Hgb of 6/7. Will continue to attempt as schedule allows.

## 2023-09-08 NOTE — PROGRESS NOTES
Nephrology Progress Note        [address], 9[address] [facility], [address] [place] [address]  Phone: [phone]  Office Hours: 8:30AM - 4:30PM  Monday - Friday 9/8/2023 7:12 AM  Subjective:   Admit Date: 9/2/2023  PCP: No primary care provider on file. Interval History:     Diet: ADULT DIET;  Regular; 5 carb choices (75 gm/meal)  ADULT ORAL NUTRITION SUPPLEMENT; Breakfast, Lunch, Dinner; Diabetic Oral Supplement      Data:   Scheduled Meds:   sodium bicarbonate  1,300 mg Oral TID    potassium bicarb-citric acid  40 mEq Oral Daily    nicotine  1 patch TransDERmal Daily    metoprolol  5 mg IntraVENous Q6H    [Held by provider] dilTIAZem  60 mg Oral 4 times per day    iron sucrose  300 mg IntraVENous Q24H    [Held by provider] metoprolol tartrate  50 mg Oral BID    [Held by provider] sodium zirconium cyclosilicate  10 g Oral Daily    megestrol  200 mg Oral Daily    allopurinol  300 mg Oral Daily    aspirin  81 mg Oral Daily    atorvastatin  40 mg Oral Nightly    ferrous gluconate  324 mg Oral Daily with breakfast    folic acid  1 mg Oral Daily    insulin glargine  8 Units SubCUTAneous QAM    pantoprazole  40 mg Oral QAM AC    cyanocobalamin  1,000 mcg Oral Daily    sodium chloride flush  5-40 mL IntraVENous 2 times per day    enoxaparin  30 mg SubCUTAneous Daily    ipratropium 0.5 mg-albuterol 2.5 mg  1 Dose Inhalation Q4H WA RT    guaiFENesin  600 mg Oral BID    cefepime  1,000 mg IntraVENous Q12H    insulin lispro  0-8 Units SubCUTAneous TID WC    insulin lispro  0-4 Units SubCUTAneous Nightly     Continuous Infusions:   sodium chloride      dilTIAZem 15 mg/hr (09/08/23 0349)    sodium chloride 25 mL (09/03/23 2159)    dextrose       PRN Meds:sodium chloride, ipratropium 0.5 mg-albuterol 2.5 mg, naloxone, lidocaine, sodium chloride flush, sodium chloride, ondansetron **OR** ondansetron, polyethylene glycol, acetaminophen **OR** acetaminophen, glucose, dextrose bolus **OR** dextrose bolus, glucagon (rDNA), thomas                  Electronically signed by Teo Ruby DO on 9/8/2023 at 7:12 AM    [address] MD Rebecca Clifton DO  [address] [place] [address]  PHONE: [phone]  FAX: [phone]

## 2023-09-08 NOTE — PLAN OF CARE
Pt remains on continuous bipap between 80%-100% FiO2, pt tachypneic in 40s and can only speak one word at a time when Bipap is off for oral care and water. Pt had urine retention of 939 mL, when informed of order for campos, pt refused and did urinate 700mL after more encouragement, post void = 288mL. Problem: Discharge Planning  Goal: Discharge to home or other facility with appropriate resources  Outcome: Not Progressing     Problem: Chronic Conditions and Co-morbidities  Goal: Patient's chronic conditions and co-morbidity symptoms are monitored and maintained or improved  Outcome: Not Progressing     Problem: Pain  Goal: Verbalizes/displays adequate comfort level or baseline comfort level  Outcome: Not Progressing     Problem: Safety - Adult  Goal: Free from fall injury  Outcome: Not Progressing     Problem: ABCDS Injury Assessment  Goal: Absence of physical injury  Outcome: Not Progressing     Problem: Skin/Tissue Integrity  Goal: Absence of new skin breakdown  Description: 1. Monitor for areas of redness and/or skin breakdown  2. Assess vascular access sites hourly  3. Every 4-6 hours minimum:  Change oxygen saturation probe site  4. Every 4-6 hours:  If on nasal continuous positive airway pressure, respiratory therapy assess nares and determine need for appliance change or resting period.   Outcome: Not Progressing     Problem: Nutrition Deficit:  Goal: Optimize nutritional status  Outcome: Not Progressing

## 2023-09-08 NOTE — PLAN OF CARE
Problem: Discharge Planning  Goal: Discharge to home or other facility with appropriate resources  9/8/2023 1151 by Jeana Calero RN  Outcome: Progressing  9/8/2023 0646 by Shawn Gonzalez RN  Outcome: Not Progressing     Problem: Chronic Conditions and Co-morbidities  Goal: Patient's chronic conditions and co-morbidity symptoms are monitored and maintained or improved  9/8/2023 1151 by Jeana Calero RN  Outcome: Progressing  9/8/2023 0646 by Shawn Gonzalez RN  Outcome: Not Progressing     Problem: Pain  Goal: Verbalizes/displays adequate comfort level or baseline comfort level  9/8/2023 1151 by Jeana Calero RN  Outcome: Progressing  9/8/2023 0646 by Shawn Gonzalez RN  Outcome: Not Progressing     Problem: Safety - Adult  Goal: Free from fall injury  9/8/2023 1151 by Jeana Calero RN  Outcome: Progressing  9/8/2023 0646 by Shawn Gonzalez RN  Outcome: Not Progressing     Problem: ABCDS Injury Assessment  Goal: Absence of physical injury  9/8/2023 1151 by Jeana Calero RN  Outcome: Progressing  9/8/2023 0646 by Shawn Gonzalez RN  Outcome: Not Progressing     Problem: Skin/Tissue Integrity  Goal: Absence of new skin breakdown  Description: 1. Monitor for areas of redness and/or skin breakdown  2. Assess vascular access sites hourly  3. Every 4-6 hours minimum:  Change oxygen saturation probe site  4. Every 4-6 hours:  If on nasal continuous positive airway pressure, respiratory therapy assess nares and determine need for appliance change or resting period.   9/8/2023 1151 by Jeana Calero RN  Outcome: Progressing  9/8/2023 0646 by Shawn Gonzalez RN  Outcome: Not Progressing     Problem: Nutrition Deficit:  Goal: Optimize nutritional status  9/8/2023 1151 by Jaena Calero RN  Outcome: Progressing  9/8/2023 0646 by Shawn Gonzalez RN  Outcome: Not Progressing     Problem: Discharge Planning  Goal: Discharge to home or other facility with appropriate resources  9/8/2023 1151 by Gabriela Mcclellan RN  Outcome: Progressing  9/8/2023 0646 by Marco A Gomez RN  Outcome: Not Progressing     Problem: Chronic Conditions and Co-morbidities  Goal: Patient's chronic conditions and co-morbidity symptoms are monitored and maintained or improved  9/8/2023 1151 by Gabriela Mcclellan RN  Outcome: Progressing  9/8/2023 0646 by Marco A Gomez RN  Outcome: Not Progressing     Problem: Pain  Goal: Verbalizes/displays adequate comfort level or baseline comfort level  9/8/2023 1151 by Gabriela Mcclellan RN  Outcome: Progressing  9/8/2023 0646 by Marco A Gomez RN  Outcome: Not Progressing     Problem: Safety - Adult  Goal: Free from fall injury  9/8/2023 1151 by Gabriela Mcclellan RN  Outcome: Progressing  9/8/2023 0646 by Marco A Gomez RN  Outcome: Not Progressing     Problem: ABCDS Injury Assessment  Goal: Absence of physical injury  9/8/2023 1151 by Gabriela Mcclellan RN  Outcome: Progressing  9/8/2023 0646 by Marco A Gomez RN  Outcome: Not Progressing     Problem: Skin/Tissue Integrity  Goal: Absence of new skin breakdown  Description: 1. Monitor for areas of redness and/or skin breakdown  2. Assess vascular access sites hourly  3. Every 4-6 hours minimum:  Change oxygen saturation probe site  4. Every 4-6 hours:  If on nasal continuous positive airway pressure, respiratory therapy assess nares and determine need for appliance change or resting period.   9/8/2023 1151 by Gabriela Mcclellan RN  Outcome: Progressing  9/8/2023 0646 by Marco A Gomez RN  Outcome: Not Progressing     Problem: Nutrition Deficit:  Goal: Optimize nutritional status  9/8/2023 1151 by Gabriela Mcclellan RN  Outcome: Progressing  9/8/2023 0646 by MarcoA Gomez RN  Outcome: Not Progressing

## 2023-09-08 NOTE — PROGRESS NOTES
Patient decompensated early yesterday am and is now [facility] status    GI will sign off now- call if need to see again

## 2023-09-08 NOTE — PROGRESS NOTES
RR-RN called for IV access on patient that is often uncooperative. 20ga PIV placed without complications in R forearm and primary RN notified. Please call if any further needs should arise.     Jace Allen RN   [facility] [phone]

## 2023-09-08 NOTE — PROGRESS NOTES
[facility]  [address] [place] [address]  Phone: [phone]    Fax [phone]                  Steven Waldrop MD, Caitlin Bond MD, Justice Vaca MD, MD Janette Broussard MD Delpha Nanny, MD Dr. Thuy Stacy MD  [facility], APRN      Shahrzad Henderson, APRERICKSON Evans, APRN    Cindy Pemberton, APRERICKSON Perez PA-C    CARDIOLOGY  NOTE      Name:  [facility] /Age/Sex:   (74 y.o. male)   MRN & CSN:  [id number] & [id number] Admission Date/Time: 2023 10:35 AM   Location:  -A PCP: No primary care provider on file. Hospital Day: 7    - Cardiology consult is for: Atrial fibrillation with rapid ventricular response      ASSESSMENT/ PLAN:  New onset atrial fibrillation with rapid ventricular response  -Rate currently  persistently 120-130 despite increases in cardizem  -[id number]: 2  -Echo showing preserved EF, no wall motion abnormalities. Mild MR and moderate Left Atrium dilation  -Resume oral lopressor.   -Continue IV Cardizem, currently maxed  -Patient is high risk for oral anticoagulant given multiple myeloma and recent GI bleeding. Will reach out to Oncology and GI about safety of oral anticoagulant  -Unable to perform WILL cardioversion due to oxygen requirement. Will continue medical management, can reconsider if hypoxia improves  -Continue aspirin alone for now  Acute heart failure with preserved ejection fraction  -Strict I's and O's and daily weights  -BNP elevated, however no motion edema noted  -CXR showing some pulmonary edema  -Give IV Lasix 20 mg x 1.   Evaluate to see if any improvement  DORCAS on CKD 3  -Nephrology following  Acute respiratory failure with Sepsis secondary to pneumonia  COPD  -Per primary care  -On BiPAP  Anemia:  monitor for worsening anemia  Hyperlipidemia  -Continue Lipitor 40 mg nightly  History QAM    pantoprazole  40 mg Oral QAM AC    cyanocobalamin  1,000 mcg Oral Daily    sodium chloride flush  5-40 mL IntraVENous 2 times per day    enoxaparin  30 mg SubCUTAneous Daily    ipratropium 0.5 mg-albuterol 2.5 mg  1 Dose Inhalation Q4H WA RT    guaiFENesin  600 mg Oral BID    cefepime  1,000 mg IntraVENous Q12H    insulin lispro  0-8 Units SubCUTAneous TID WC    insulin lispro  0-4 Units SubCUTAneous Nightly      sodium chloride      dilTIAZem 15 mg/hr (09/08/23 1622)    sodium chloride 25 mL (09/03/23 2159)    dextrose       sodium chloride, ipratropium 0.5 mg-albuterol 2.5 mg, naloxone, lidocaine, sodium chloride flush, sodium chloride, ondansetron **OR** ondansetron, polyethylene glycol, acetaminophen **OR** acetaminophen, glucose, dextrose bolus **OR** dextrose bolus, glucagon (rDNA), dextrose    Lab Data:  CBC:   Recent Labs     09/06/23  0757 09/07/23  0521 09/08/23  0220 09/08/23  1340   WBC 4.3 9.2 8.8  --    HGB 7.5* 7.4* 6.7* 8.4*   HCT 25.2* 26.2* 22.7* 27.9*   .0* 114.4* 110.2*  --     238 238  --        BMP:   Recent Labs     09/06/23  0757 09/07/23  0521 09/08/23  0220    142 142   K 3.5 3.8 3.4*    107 108   CO2 17* 18* 19*   BUN 58* 56* 57*   CREATININE 2.3* 2.3* 2.7*       LIVER PROFILE:   Recent Labs     09/07/23  0521   AST 19   ALT 12   BILITOT 0.2   ALKPHOS 160*       PT/INR:   No results for input(s): PROTIME, INR in the last 72 hours. APTT:   No results for input(s): APTT in the last 72 hours. BNP:  No results for input(s): BNP in the last 72 hours. TROPONIN:   No results for input(s): TROPONINT in the last 72 hours.     Labs, consult, tests reviewed      Fernando Medina PA-C, 9/8/2023 4:30 PM    CARDIOLOGY ATTENDING ADDENDUM    MEDICAL DECISION MAKING:    Atrial fibrillation with rapid ventricular rate  Acute on chronic kidney injury  Severe anemia  Newly diagnosed multiple myeloma     As above patient remains in atrial fibrillation with rapid

## 2023-09-08 NOTE — PROGRESS NOTES
Primary RN have discussed with the  patient the rationale for blood component transfusion; its benefits in treating or preventing fatigue, organ damage, or death; and its risk which includes mild transfusion reactions, rare risk of blood borne infection, or more serious but rare reactions. RN has  discussed the alternatives to transfusion, including the risk and consequences of not receiving transfusion. The patient  had an opportunity to ask questions to  tele NP and had agreed to proceed with transfusion of blood components.

## 2023-09-09 LAB
ABO/RH: NORMAL
ANTIBODY SCREEN: NEGATIVE
COMMENT: NORMAL
COMPONENT: NORMAL
CROSSMATCH RESULT: NORMAL
STATUS: NORMAL
TRANSFUSION STATUS: NORMAL
UNIT DIVISION: 0
UNIT NUMBER: NORMAL

## 2023-09-09 NOTE — PROGRESS NOTES
Pt code status changed to [facility], patient and family requesting Dc of tele and oxygen. I will dc tele I will leave oxygen order in in case patient would want it back.

## 2023-09-09 NOTE — PROGRESS NOTES
Pt son at bedside, [facility] arrangements have been made to donate and autopsy pt body with the [facility]. [facility] will call [facility] hotline first thing in the morning.

## 2023-09-09 NOTE — PROGRESS NOTES
Daughter at bedside, pt states he no longer wants to wear 02. Pt is 40L at 85% on airvo at this time. Daughter is agreeable with pt decision, this nurse educated pt and family on possibility of rapid decline both daughter and pt agree. Physician notified and is also agreeable.

## (undated) DEVICE — SNARE VASC L240CM LOOP W10MM SHTH DIA2.4MM RND STIFF CLD

## (undated) DEVICE — FORCEPS BX L240CM JAW DIA2.8MM L CAP W/ NDL MIC MESH TOOTH

## (undated) DEVICE — ENDOSCOPY KIT: Brand: MEDLINE INDUSTRIES, INC.